# Patient Record
Sex: FEMALE | Race: WHITE | NOT HISPANIC OR LATINO | Employment: FULL TIME | URBAN - NONMETROPOLITAN AREA
[De-identification: names, ages, dates, MRNs, and addresses within clinical notes are randomized per-mention and may not be internally consistent; named-entity substitution may affect disease eponyms.]

---

## 2017-03-31 ENCOUNTER — HOSPITAL ENCOUNTER (EMERGENCY)
Facility: HOSPITAL | Age: 22
Discharge: HOME | End: 2017-03-31
Attending: FAMILY MEDICINE | Admitting: NURSE PRACTITIONER

## 2017-03-31 VITALS
HEIGHT: 71 IN | TEMPERATURE: 97.9 F | DIASTOLIC BLOOD PRESSURE: 66 MMHG | RESPIRATION RATE: 16 BRPM | BODY MASS INDEX: 21 KG/M2 | WEIGHT: 150 LBS | SYSTOLIC BLOOD PRESSURE: 108 MMHG | OXYGEN SATURATION: 99 % | HEART RATE: 88 BPM

## 2017-03-31 DIAGNOSIS — R11.2 NON-INTRACTABLE VOMITING WITH NAUSEA, UNSPECIFIED VOMITING TYPE: ICD-10-CM

## 2017-03-31 DIAGNOSIS — J02.9 PHARYNGITIS, UNSPECIFIED ETIOLOGY: Primary | ICD-10-CM

## 2017-03-31 LAB
FLUAV AG NPH QL: NEGATIVE
FLUBV AG NPH QL IA: NEGATIVE
S PYO AG THROAT QL: NEGATIVE

## 2017-03-31 PROCEDURE — 87804 INFLUENZA ASSAY W/OPTIC: CPT | Performed by: FAMILY MEDICINE

## 2017-03-31 PROCEDURE — 99283 EMERGENCY DEPT VISIT LOW MDM: CPT

## 2017-03-31 PROCEDURE — 87081 CULTURE SCREEN ONLY: CPT | Performed by: FAMILY MEDICINE

## 2017-03-31 PROCEDURE — 87880 STREP A ASSAY W/OPTIC: CPT | Performed by: FAMILY MEDICINE

## 2017-03-31 RX ORDER — PROMETHAZINE HYDROCHLORIDE 25 MG/1
25 TABLET ORAL EVERY 6 HOURS PRN
Qty: 20 TABLET | Refills: 0 | Status: SHIPPED | OUTPATIENT
Start: 2017-03-31 | End: 2017-04-18

## 2017-03-31 RX ORDER — AMOXICILLIN AND CLAVULANATE POTASSIUM 875; 125 MG/1; MG/1
1 TABLET, FILM COATED ORAL 2 TIMES DAILY
Qty: 20 TABLET | Refills: 0 | Status: SHIPPED | OUTPATIENT
Start: 2017-03-31 | End: 2017-04-10

## 2017-04-02 LAB — BACTERIA SPEC AEROBE CULT: NORMAL

## 2017-04-18 ENCOUNTER — PROCEDURE VISIT (OUTPATIENT)
Dept: OBSTETRICS AND GYNECOLOGY | Facility: CLINIC | Age: 22
End: 2017-04-18

## 2017-04-18 VITALS
HEIGHT: 71 IN | BODY MASS INDEX: 22.82 KG/M2 | DIASTOLIC BLOOD PRESSURE: 68 MMHG | SYSTOLIC BLOOD PRESSURE: 108 MMHG | WEIGHT: 163 LBS

## 2017-04-18 DIAGNOSIS — Z30.46 SURVEILLANCE OF IMPLANTABLE SUBDERMAL CONTRACEPTIVE: Primary | ICD-10-CM

## 2017-04-18 DIAGNOSIS — N89.8 VAGINAL DISCHARGE: ICD-10-CM

## 2017-04-18 DIAGNOSIS — A63.0 GENITAL WARTS: ICD-10-CM

## 2017-04-18 LAB
CANDIDA ALBICANS: NEGATIVE
GARDNERELLA VAGINALIS: POSITIVE
TRICHOMONAS VAGINALIS PCR: NEGATIVE

## 2017-04-18 PROCEDURE — 11983 REMOVE/INSERT DRUG IMPLANT: CPT | Performed by: NURSE PRACTITIONER

## 2017-04-18 PROCEDURE — 87510 GARDNER VAG DNA DIR PROBE: CPT | Performed by: NURSE PRACTITIONER

## 2017-04-18 PROCEDURE — 87660 TRICHOMONAS VAGIN DIR PROBE: CPT | Performed by: NURSE PRACTITIONER

## 2017-04-18 PROCEDURE — 87480 CANDIDA DNA DIR PROBE: CPT | Performed by: NURSE PRACTITIONER

## 2017-04-18 RX ORDER — METRONIDAZOLE 500 MG/1
500 TABLET ORAL 2 TIMES DAILY
Qty: 14 TABLET | Refills: 0 | Status: SHIPPED | OUTPATIENT
Start: 2017-04-18 | End: 2017-04-25

## 2017-04-18 RX ORDER — IMIQUIMOD 12.5 MG/.25G
CREAM TOPICAL 3 TIMES WEEKLY
Qty: 12 EACH | Refills: 2 | Status: SHIPPED | OUTPATIENT
Start: 2017-04-19 | End: 2018-09-13

## 2017-04-18 NOTE — PROGRESS NOTES
Nexplanon Removal and Reinsertion    Patient's last menstrual period was 04/16/2017 (exact date). Patient having c/o vaginal discharge and skin tags on vulva that has appeared within the last week. These areas are itchy. Patient swabbed self, and after doing physical exam, these lesions are consistent with condyloma.     Date of procedure:  4/18/2017    Risks and benefits discussed? yes  All questions answered? yes  Consents given by the patient  Written consent obtained? yes    Local anesthesia used:  yes - 2 cc's of  Meds; anesthesia local: 1% lidocaine with epinephrine    Procedure documentation:    The upper left arm (non-dominant) was marked at the intended site of removal.  The skin was cleansed with an antiseptic solution.  Local anesthesia was injected.  A small incision was created at the distal tip of the implant.  The implant was removed intact without difficulty.    The new Nexplanon was placed subdermally without difficulty through the same incision used to remove the prior implant.  The devise was able to be palpated in the arm by both myself and Millie.  Steri-strips were then placed across the site of insertion and the arm was wrapped.    She tolerated the procedure well.  There were no complications.  EBL was minimal.    NDC# 6185-7058-00    Post procedure instructions: Remove the wrapping in 24 hours and the steri-strips in 5 days.    Follow up needed: PRN    Assessment/Plan:  Millie was seen today for procedure and personal problem.    Diagnoses and all orders for this visit:    Surveillance of implantable subdermal contraceptive    Genital warts    Vaginal discharge  -     Gardnerella vaginalis, Trichomonas vaginalis, Candida albicans, PCR    Other orders  -     imiquimod (ALDARA) 5 % cream; Apply  topically 3 (Three) Times a Week.        This note was electronically signed.    Kal Whitfield, APRN  April 18, 2017

## 2018-09-13 ENCOUNTER — PROCEDURE VISIT (OUTPATIENT)
Dept: OBSTETRICS AND GYNECOLOGY | Facility: CLINIC | Age: 23
End: 2018-09-13

## 2018-09-13 VITALS
DIASTOLIC BLOOD PRESSURE: 80 MMHG | HEIGHT: 71 IN | BODY MASS INDEX: 22.26 KG/M2 | SYSTOLIC BLOOD PRESSURE: 112 MMHG | WEIGHT: 159 LBS

## 2018-09-13 DIAGNOSIS — Z30.46 NEXPLANON REMOVAL: Primary | ICD-10-CM

## 2018-09-13 DIAGNOSIS — R12 HEARTBURN: ICD-10-CM

## 2018-09-13 PROCEDURE — 11982 REMOVE DRUG IMPLANT DEVICE: CPT | Performed by: NURSE PRACTITIONER

## 2018-09-13 RX ORDER — PNV NO.95/FERROUS FUM/FOLIC AC 28MG-0.8MG
1 TABLET ORAL DAILY
Qty: 30 TABLET | Refills: 12 | Status: SHIPPED | OUTPATIENT
Start: 2018-09-13 | End: 2019-05-17 | Stop reason: SDUPTHER

## 2018-09-13 RX ORDER — OMEPRAZOLE 20 MG/1
20 TABLET, DELAYED RELEASE ORAL DAILY
Qty: 30 TABLET | Refills: 12 | OUTPATIENT
Start: 2018-09-13 | End: 2019-06-05

## 2018-09-13 NOTE — PROGRESS NOTES
Nexplanon Removal    Date of procedure:  9/13/2018    Risks and benefits discussed? yes  All questions answered? yes  Consents given by the patient  Written consent obtained? yes    Local anesthesia used:  yes - 3 cc's of  Meds; anesthesia local: 1% lidocaine with epinephrine    Procedure documentation:    The upper left arm (non-dominant) was marked at the intended site of removal.  The skin was cleansed with an antispetic solution.  Local anesthesia was injected.  A vertical horizontal was created at the distal tip of the implant.  The implant was removed intact without difficulty.  A 4x4 sterile gauze was placed over the incision site and the arm was wrapped with gauze.  She tolerated the procedure well.  There were no complications.  EBL was minimal.    Post procedure instructions: Remove the wrapping in 24 hours and cover with a  band-aid if still open.    Follow up needed: Next available time for pap smear. C/O frequent heartburn; takes Tums frequently. Sent Rx for prilosec.        Millie was seen today for nexplanon removal.    Diagnoses and all orders for this visit:    Nexplanon removal    Heartburn    Other orders  -     Prenatal Vit-Fe Fumarate-FA (PRENATAL VITAMIN) 27-0.8 MG tablet; Take 1 tablet by mouth Daily.  -     omeprazole OTC (PRILOSEC OTC) 20 MG EC tablet; Take 1 tablet by mouth Daily.        This note was electronically signed.    Alicia Mckeon, NISHI  September 13, 2018

## 2018-09-21 ENCOUNTER — APPOINTMENT (OUTPATIENT)
Dept: CT IMAGING | Facility: HOSPITAL | Age: 23
End: 2018-09-21

## 2018-09-21 ENCOUNTER — HOSPITAL ENCOUNTER (EMERGENCY)
Facility: HOSPITAL | Age: 23
Discharge: HOME | End: 2018-09-21
Attending: EMERGENCY MEDICINE | Admitting: EMERGENCY MEDICINE

## 2018-09-21 ENCOUNTER — APPOINTMENT (OUTPATIENT)
Dept: GENERAL RADIOLOGY | Facility: HOSPITAL | Age: 23
End: 2018-09-21

## 2018-09-21 VITALS
TEMPERATURE: 98.2 F | RESPIRATION RATE: 18 BRPM | OXYGEN SATURATION: 98 % | HEART RATE: 67 BPM | SYSTOLIC BLOOD PRESSURE: 114 MMHG | WEIGHT: 162 LBS | BODY MASS INDEX: 22.68 KG/M2 | DIASTOLIC BLOOD PRESSURE: 61 MMHG | HEIGHT: 71 IN

## 2018-09-21 DIAGNOSIS — R55 SYNCOPE AND COLLAPSE: Primary | ICD-10-CM

## 2018-09-21 LAB
ALBUMIN SERPL-MCNC: 4.3 G/DL (ref 3.4–4.8)
ALBUMIN/GLOB SERPL: 1.2 G/DL (ref 1.1–1.8)
ALP SERPL-CCNC: 83 U/L (ref 38–126)
ALT SERPL W P-5'-P-CCNC: 22 U/L (ref 9–52)
ANION GAP SERPL CALCULATED.3IONS-SCNC: 9 MMOL/L (ref 5–15)
APTT PPP: 28.7 SECONDS (ref 20–40.3)
AST SERPL-CCNC: 15 U/L (ref 14–36)
B-HCG UR QL: NEGATIVE
BASOPHILS # BLD AUTO: 0.02 10*3/MM3 (ref 0–0.2)
BASOPHILS NFR BLD AUTO: 0.3 % (ref 0–2)
BILIRUB SERPL-MCNC: 0.5 MG/DL (ref 0.2–1.3)
BILIRUB UR QL STRIP: NEGATIVE
BUN BLD-MCNC: 10 MG/DL (ref 7–21)
BUN/CREAT SERPL: 16.4 (ref 7–25)
CALCIUM SPEC-SCNC: 9.2 MG/DL (ref 8.4–10.2)
CHLORIDE SERPL-SCNC: 102 MMOL/L (ref 95–110)
CK MB SERPL-CCNC: <0.22 NG/ML (ref 0–5)
CK SERPL-CCNC: 34 U/L (ref 30–135)
CLARITY UR: CLEAR
CO2 SERPL-SCNC: 23 MMOL/L (ref 22–31)
COLOR UR: YELLOW
CREAT BLD-MCNC: 0.61 MG/DL (ref 0.5–1)
D-DIMER, QUANTITATIVE (MAD,POW, STR): <270 NG/ML (FEU) (ref 0–470)
DEPRECATED RDW RBC AUTO: 43.2 FL (ref 36.4–46.3)
EOSINOPHIL # BLD AUTO: 0.08 10*3/MM3 (ref 0–0.7)
EOSINOPHIL NFR BLD AUTO: 1.4 % (ref 0–7)
ERYTHROCYTE [DISTWIDTH] IN BLOOD BY AUTOMATED COUNT: 13.1 % (ref 11.5–14.5)
GFR SERPL CREATININE-BSD FRML MDRD: 122 ML/MIN/1.73 (ref 71–165)
GLOBULIN UR ELPH-MCNC: 3.7 GM/DL (ref 2.3–3.5)
GLUCOSE BLD-MCNC: 91 MG/DL (ref 60–100)
GLUCOSE UR STRIP-MCNC: NEGATIVE MG/DL
HCT VFR BLD AUTO: 41.9 % (ref 35–45)
HGB BLD-MCNC: 14.4 G/DL (ref 12–15.5)
HGB UR QL STRIP.AUTO: NEGATIVE
HOLD SPECIMEN: NORMAL
HOLD SPECIMEN: NORMAL
IMM GRANULOCYTES # BLD: 0.01 10*3/MM3 (ref 0–0.02)
IMM GRANULOCYTES NFR BLD: 0.2 % (ref 0–0.5)
INR PPP: 0.97 (ref 0.8–1.2)
KETONES UR QL STRIP: NEGATIVE
LEUKOCYTE ESTERASE UR QL STRIP.AUTO: NEGATIVE
LYMPHOCYTES # BLD AUTO: 1.53 10*3/MM3 (ref 0.6–4.2)
LYMPHOCYTES NFR BLD AUTO: 26.6 % (ref 10–50)
MCH RBC QN AUTO: 31 PG (ref 26.5–34)
MCHC RBC AUTO-ENTMCNC: 34.4 G/DL (ref 31.4–36)
MCV RBC AUTO: 90.1 FL (ref 80–98)
MONOCYTES # BLD AUTO: 0.46 10*3/MM3 (ref 0–0.9)
MONOCYTES NFR BLD AUTO: 8 % (ref 0–12)
NEUTROPHILS # BLD AUTO: 3.66 10*3/MM3 (ref 2–8.6)
NEUTROPHILS NFR BLD AUTO: 63.5 % (ref 37–80)
NITRITE UR QL STRIP: NEGATIVE
PH UR STRIP.AUTO: 6 [PH] (ref 5–9)
PLATELET # BLD AUTO: 241 10*3/MM3 (ref 150–450)
PMV BLD AUTO: 10 FL (ref 8–12)
POTASSIUM BLD-SCNC: 3.4 MMOL/L (ref 3.5–5.1)
PROT SERPL-MCNC: 8 G/DL (ref 6.3–8.6)
PROT UR QL STRIP: NEGATIVE
PROTHROMBIN TIME: 12.7 SECONDS (ref 11.1–15.3)
RBC # BLD AUTO: 4.65 10*6/MM3 (ref 3.77–5.16)
SODIUM BLD-SCNC: 134 MMOL/L (ref 137–145)
SP GR UR STRIP: 1.01 (ref 1–1.03)
TROPONIN I SERPL-MCNC: <0.012 NG/ML
UROBILINOGEN UR QL STRIP: NORMAL
WBC NRBC COR # BLD: 5.76 10*3/MM3 (ref 3.2–9.8)
WHOLE BLOOD HOLD SPECIMEN: NORMAL
WHOLE BLOOD HOLD SPECIMEN: NORMAL

## 2018-09-21 PROCEDURE — 93005 ELECTROCARDIOGRAM TRACING: CPT | Performed by: EMERGENCY MEDICINE

## 2018-09-21 PROCEDURE — 93010 ELECTROCARDIOGRAM REPORT: CPT | Performed by: INTERNAL MEDICINE

## 2018-09-21 PROCEDURE — 82553 CREATINE MB FRACTION: CPT | Performed by: EMERGENCY MEDICINE

## 2018-09-21 PROCEDURE — 85025 COMPLETE CBC W/AUTO DIFF WBC: CPT | Performed by: EMERGENCY MEDICINE

## 2018-09-21 PROCEDURE — 84484 ASSAY OF TROPONIN QUANT: CPT | Performed by: EMERGENCY MEDICINE

## 2018-09-21 PROCEDURE — 99284 EMERGENCY DEPT VISIT MOD MDM: CPT

## 2018-09-21 PROCEDURE — 82550 ASSAY OF CK (CPK): CPT | Performed by: EMERGENCY MEDICINE

## 2018-09-21 PROCEDURE — 71046 X-RAY EXAM CHEST 2 VIEWS: CPT

## 2018-09-21 PROCEDURE — 70450 CT HEAD/BRAIN W/O DYE: CPT

## 2018-09-21 PROCEDURE — 93005 ELECTROCARDIOGRAM TRACING: CPT

## 2018-09-21 PROCEDURE — 81025 URINE PREGNANCY TEST: CPT

## 2018-09-21 PROCEDURE — 81003 URINALYSIS AUTO W/O SCOPE: CPT

## 2018-09-21 PROCEDURE — 80053 COMPREHEN METABOLIC PANEL: CPT | Performed by: EMERGENCY MEDICINE

## 2018-09-21 PROCEDURE — 85730 THROMBOPLASTIN TIME PARTIAL: CPT | Performed by: EMERGENCY MEDICINE

## 2018-09-21 PROCEDURE — 85379 FIBRIN DEGRADATION QUANT: CPT | Performed by: EMERGENCY MEDICINE

## 2018-09-21 PROCEDURE — 96360 HYDRATION IV INFUSION INIT: CPT

## 2018-09-21 PROCEDURE — 85610 PROTHROMBIN TIME: CPT | Performed by: EMERGENCY MEDICINE

## 2018-09-21 RX ORDER — SODIUM CHLORIDE 0.9 % (FLUSH) 0.9 %
10 SYRINGE (ML) INJECTION AS NEEDED
Status: DISCONTINUED | OUTPATIENT
Start: 2018-09-21 | End: 2018-09-21 | Stop reason: HOSPADM

## 2018-09-21 RX ORDER — POTASSIUM CHLORIDE 750 MG/1
20 CAPSULE, EXTENDED RELEASE ORAL ONCE
Status: COMPLETED | OUTPATIENT
Start: 2018-09-21 | End: 2018-09-21

## 2018-09-21 RX ORDER — ACETAMINOPHEN 500 MG
1000 TABLET ORAL ONCE
Status: COMPLETED | OUTPATIENT
Start: 2018-09-21 | End: 2018-09-21

## 2018-09-21 RX ORDER — SODIUM CHLORIDE 9 MG/ML
INJECTION, SOLUTION INTRAVENOUS
Status: COMPLETED
Start: 2018-09-21 | End: 2018-09-21

## 2018-09-21 RX ADMIN — POTASSIUM CHLORIDE 20 MEQ: 750 CAPSULE, EXTENDED RELEASE ORAL at 16:18

## 2018-09-21 RX ADMIN — SODIUM CHLORIDE 500 ML: 9 INJECTION, SOLUTION INTRAVENOUS at 14:40

## 2018-09-21 RX ADMIN — ACETAMINOPHEN 1000 MG: 500 TABLET ORAL at 14:39

## 2018-09-21 NOTE — ED NOTES
Stressed importance of f/u with cardiologist and establishing care with PCP      Mari Adame, RN  09/21/18 [id number]

## 2018-09-21 NOTE — ED PROVIDER NOTES
"Subjective   23yo female pmh significant asthma presents ED c/o unwitnessed syncopal episode earlier today while standing in bathroom preceeded by substernal chest \"tightness.\"  Pt reports subsequently awoke on floor.  ROS (+) chronic intermittent headaches; pt reports current 2d hx headache.  ROS neg fever/n/v/d/soa/cough/abd pain/dysuria/ hx seizure/incontinence/family hx sudden cardiac death.        Syncope   Episode history:  Single  Most recent episode:  Today  Chronicity:  Recurrent  Witnessed: no    Associated symptoms: chest pain and headaches    Associated symptoms: no shortness of breath and no weakness        Review of Systems   Constitutional: Negative.    HENT: Negative for congestion.    Eyes: Negative.    Respiratory: Positive for chest tightness. Negative for shortness of breath.    Cardiovascular: Positive for chest pain and syncope.   Gastrointestinal: Negative.    Genitourinary: Negative.    Musculoskeletal: Negative.    Skin: Negative.    Neurological: Positive for syncope and headaches. Negative for weakness and numbness.   All other systems reviewed and are negative.      Past Medical History:   Diagnosis Date   • Abnormal Pap smear of cervix    • Acute maxillary sinusitis    • Acute otitis externa    • Acute pharyngitis    • Acute tonsillitis    • Allergic asthma     IgE-mediated allergic asthma     • Allergic rhinitis    • Allergic rhinitis due to pollen    • Anxiety    • Asthma    • Chondromalacia of patella     left knee    • Common cold    • Cough    • Diarrhea    • Disorder of gallbladder     Probable biliary dyskinesia    • Epigastric pain    • Foot pain    • Gastroenteritis    • Generalized abdominal pain    • Headache    • History of colonoscopy 03/27/2013    Colon endoscopy 13571 (1)  -  Internal & external hemorrhoids found.   • History of esophagogastroduodenoscopy (EGD) 03/27/2013    w/ tube 05736 (1)  -  Normal esophagus. Gastritis in stomach. Biopsy taken. Normal duodenum. Biospy " taken   • HPV (human papilloma virus) infection    • Influenza    • Irregular periods    • Irritable bowel syndrome    • Migraine    • Nausea    • Nausea and vomiting    • Osgood-Schlatter's disease    • Pain in throat    • Patellar tendonitis    • Right upper quadrant pain    • Streptococcal sore throat    • Temporomandibular joint disorder     WISDOM TEETH    • Upper respiratory infection    • Urgency of urination    • Urinary tract infectious disease    • Varicella    • Viral gastroenteritis    • Vulvovaginitis        Allergies   Allergen Reactions   • Aspirin      TIGHT CHEST   • Codeine      Itching   • Erythromycin Base      UNKNOWN REACTION   • Naproxen      Chest pain   • Latex Rash     Rash        Past Surgical History:   Procedure Laterality Date   • CHOLECYSTECTOMY  06/06/2013    Laparoscopic  -  laparoscopic cholecystectomy with intraoperative cholangiogram. Cholecystitis.   • INJECTION OF MEDICATION  01/08/2013    Toradol (1)   -  W. Sotomayor   • WISDOM TOOTH EXTRACTION         Family History   Problem Relation Age of Onset   • Adopted: Yes   • Cancer Other    • Diabetes Other    • Heart disease Other    • Hypertension Other    • Stroke Other    • Lung disease Other    • Cholelithiasis Other    • Ulcers Other    • Other Other         Colon problems   • Ovarian cysts Mother    • Bipolar disorder Mother        Social History     Social History   • Marital status: Single     Social History Main Topics   • Smoking status: Never Smoker   • Smokeless tobacco: Never Used   • Alcohol use Yes      Comment: occasional   • Drug use: No   • Sexual activity: Yes     Partners: Male     Birth control/ protection: None     Other Topics Concern   • Not on file           Objective   Physical Exam   Constitutional: She is oriented to person, place, and time. She appears well-developed and well-nourished.   HENT:   Head: Normocephalic and atraumatic.   Right Ear: External ear normal.   Left Ear: External ear normal.   Nose:  Nose normal.   Mouth/Throat: Oropharynx is clear and moist.   Eyes: Pupils are equal, round, and reactive to light. EOM are normal.   Neck: Normal range of motion. Neck supple. No JVD present. No tracheal deviation present.   Cardiovascular: Normal rate, regular rhythm, normal heart sounds and intact distal pulses.  Exam reveals no gallop and no friction rub.    No murmur heard.  Pulmonary/Chest: Effort normal and breath sounds normal. She has no wheezes. She has no rales.   Abdominal: Soft. Bowel sounds are normal. She exhibits no mass. There is no tenderness. There is no rebound and no guarding.   Musculoskeletal: Normal range of motion.   Lymphadenopathy:     She has no cervical adenopathy.   Neurological: She is alert and oriented to person, place, and time. She has normal strength. No cranial nerve deficit or sensory deficit. Coordination normal. GCS eye subscore is 4. GCS verbal subscore is 5. GCS motor subscore is 6.   Skin: Skin is warm and dry.   Nursing note and vitals reviewed.      ECG 12 Lead    Date/Time: 9/21/2018 2:38 PM  Performed by: SIMONE ORELLANA  Authorized by: SIMONE ORELLANA   Interpreted by physician  Rhythm: sinus rhythm  Rate: normal  BPM: 82  QRS axis: normal  Conduction: conduction normal  ST Segments: ST segments normal  T Waves: T waves normal  Other: no other findings  Clinical impression: normal ECG                 ED Course      Labs Reviewed   COMPREHENSIVE METABOLIC PANEL - Abnormal; Notable for the following:        Result Value    Sodium 134 (*)     Potassium 3.4 (*)     Globulin 3.7 (*)     All other components within normal limits   PREGNANCY, URINE - Normal   URINALYSIS W/ MICROSCOPIC IF INDICATED (NO CULTURE) - Normal    Narrative:     Urine microscopic not indicated.   CBC WITH AUTO DIFFERENTIAL - Normal   PROTIME-INR - Normal    Narrative:     Therapeutic range for most indications is 2.0-3.0 INR,  or 2.5-3.5 for mechanical heart valves.   APTT - Normal    Narrative:     The  recommended Heparin therapeutic range is 68-97 seconds.   TROPONIN (IN-HOUSE) - Normal   CK - Normal   CK MB - Normal   D-DIMER, QUANTITATIVE - Normal    Narrative:     Dimer values <500 ng/ml FEU are FDA approved as aid in diagnosis of deep venous thrombosis and pulmonary embolism.  This test should not be used in an exclusion strategy with pretest probability alone.    A recent guideline regarding diagnosis for pulmonary thromboembolism recommends an adjusted exclusion criterion of age x 10 ng/ml FEU for patients >50 years of age ([employer] Intern Med 2015; 163: 701-711).   RAINBOW DRAW    Narrative:     The following orders were created for panel order [place] Draw.  Procedure                               Abnormality         Status                     ---------                               -----------         ------                     Light Blue Top[[id number]]                                   Final result               Green Top (Gel)[[id number]]                                  Final result               Lavender Top[[id number]]                                     Final result               Gold Top - SST[[id number]]                                   Final result                 Please view results for these tests on the individual orders.   TROPONIN (IN-HOUSE)   TROPONIN (IN-HOUSE)   CBC AND DIFFERENTIAL    Narrative:     The following orders were created for panel order CBC & Differential.  Procedure                               Abnormality         Status                     ---------                               -----------         ------                     CBC Auto Differential[[id number]]        Normal              Final result                 Please view results for these tests on the individual orders.   LIGHT BLUE TOP   GREEN TOP   LAVENDER TOP   GOLD TOP - SST     Xr Chest 2 View    Result Date: 9/21/2018  Narrative: TWO VIEW CHEST HISTORY: Chest pain. Chest tightness. Frontal and lateral films of the  chest were obtained. COMPARISON: October 23, 2014 EKG leads. The lungs are clear of an acute process. The heart is not enlarged. The pulmonary vasculature is not increased. No pleural effusion. No pneumothorax. No acute osseous abnormality. Surgical clips right upper quadrant of the abdomen.     Impression: CONCLUSION: Normal chest [id number] Electronically signed by:  Roberto Zimmerman MD  9/21/2018 4:00 PM CDT Workstation: [employer]    Ct Head Without Contrast    Result Date: 9/21/2018  Narrative: CT Head Without Contrast History: Headache Axial scans of the brain were obtained without intravenous contrast.  Coronal and sagital reconstructions were preformed. This exam was performed according to our departmental dose-optimization program, which includes automated exposure control, adjustment of the mA and/or kV according to patient size and/or use of iterative reconstruction technique. DLP: 829.60 Comparison: None Bone windows are unremarkable. The visualized paranasal sinuses are unremarkable. No hemorrhage. No mass. No abnormal areas of increased or decreased attenuation. No midline shift. No abnormal extra-axial fluid collections.     Impression: CONCLUSION: Normal nonenhanced CT of the brain [id number] Electronically signed by:  Roberto Zimmerman MD  9/21/2018 3:33 PM CDT Workstation: [employer]                [facility]      Final diagnoses:   Syncope and collapse            Isai Salvador MD  09/21/18 2559

## 2018-09-21 NOTE — ED NOTES
"Pt states she has had a total of 5 episodes (last episode was 3 months ago and then today) where she experiences this severe chest tightness that lasts a few seconds and then pt \"blacks out and wakes up on the floor\". Pt reports this happened in the bathroom today and reports hitting her head on the toilet or bathtub. Pt states the last episode was witnessed by a friend but she hasn't sought medical help for this.      Mari Adame, RN  09/21/18 6673    "

## 2019-05-17 ENCOUNTER — OFFICE VISIT (OUTPATIENT)
Dept: OBSTETRICS AND GYNECOLOGY | Facility: CLINIC | Age: 24
End: 2019-05-17

## 2019-05-17 VITALS
SYSTOLIC BLOOD PRESSURE: 118 MMHG | BODY MASS INDEX: 25.34 KG/M2 | WEIGHT: 181 LBS | HEART RATE: 100 BPM | DIASTOLIC BLOOD PRESSURE: 74 MMHG | HEIGHT: 71 IN

## 2019-05-17 DIAGNOSIS — Z31.69 GENERAL COUNSELING AND ADVICE FOR PROCREATIVE MANAGEMENT: ICD-10-CM

## 2019-05-17 DIAGNOSIS — Z01.419 ENCOUNTER FOR GYNECOLOGICAL EXAMINATION WITHOUT ABNORMAL FINDING: Primary | ICD-10-CM

## 2019-05-17 DIAGNOSIS — N89.8 VAGINAL DISCHARGE: ICD-10-CM

## 2019-05-17 LAB
CANDIDA ALBICANS: NEGATIVE
GARDNERELLA VAGINALIS: POSITIVE
TRICHOMONAS VAGINALIS PCR: NEGATIVE

## 2019-05-17 PROCEDURE — 87491 CHLMYD TRACH DNA AMP PROBE: CPT | Performed by: NURSE PRACTITIONER

## 2019-05-17 PROCEDURE — 87660 TRICHOMONAS VAGIN DIR PROBE: CPT | Performed by: NURSE PRACTITIONER

## 2019-05-17 PROCEDURE — 87510 GARDNER VAG DNA DIR PROBE: CPT | Performed by: NURSE PRACTITIONER

## 2019-05-17 PROCEDURE — 87480 CANDIDA DNA DIR PROBE: CPT | Performed by: NURSE PRACTITIONER

## 2019-05-17 PROCEDURE — 88142 CYTOPATH C/V THIN LAYER: CPT | Performed by: NURSE PRACTITIONER

## 2019-05-17 PROCEDURE — 87661 TRICHOMONAS VAGINALIS AMPLIF: CPT | Performed by: NURSE PRACTITIONER

## 2019-05-17 PROCEDURE — 87591 N.GONORRHOEAE DNA AMP PROB: CPT | Performed by: NURSE PRACTITIONER

## 2019-05-17 PROCEDURE — 88141 CYTOPATH C/V INTERPRET: CPT | Performed by: PATHOLOGY

## 2019-05-17 PROCEDURE — 99395 PREV VISIT EST AGE 18-39: CPT | Performed by: NURSE PRACTITIONER

## 2019-05-17 RX ORDER — PNV NO.95/FERROUS FUM/FOLIC AC 28MG-0.8MG
1 TABLET ORAL DAILY
Qty: 30 TABLET | Refills: 12 | Status: SHIPPED | OUTPATIENT
Start: 2019-05-17 | End: 2019-11-14 | Stop reason: SDUPTHER

## 2019-05-18 LAB
C TRACH RRNA CVX QL NAA+PROBE: NEGATIVE
N GONORRHOEA RRNA SPEC QL NAA+PROBE: NEGATIVE
TRICHOMONAS VAGINALIS PCR: NEGATIVE

## 2019-05-20 RX ORDER — METRONIDAZOLE 500 MG/1
500 TABLET ORAL 2 TIMES DAILY
Qty: 14 TABLET | Refills: 0 | Status: SHIPPED | OUTPATIENT
Start: 2019-05-20 | End: 2019-05-27

## 2019-05-22 NOTE — PROGRESS NOTES
Subjective   Millie Camara is a 23 y.o. Here for pap smear and has concerns about why she hasn't conceived yet; has bee trying since October 2018.    LMP- 4/20; cycles are appx 27-29 days    Her  has a 1 year old. Neither of them use alcohol, nicotine products or recreational drugs.    Last pap- 2010      Gynecologic Exam   The patient's primary symptoms include vaginal discharge. The patient's pertinent negatives include no genital itching, genital lesions, genital odor, genital rash, missed menses, pelvic pain or vaginal bleeding. This is a new problem. The current episode started 1 to 4 weeks ago. The problem occurs daily. The problem has been unchanged. The patient is experiencing no pain. Pertinent negatives include no abdominal pain, constipation, diarrhea or dysuria. She is sexually active. No, her partner does not have an STD. She uses nothing for contraception. Her menstrual history has been regular.       The following portions of the patient's history were reviewed and updated as appropriate: allergies, current medications, past family history, past medical history, past social history, past surgical history and problem list.    Review of Systems   Constitutional: Negative for activity change, appetite change, diaphoresis, fatigue, unexpected weight gain and unexpected weight loss.   Respiratory: Negative for chest tightness and shortness of breath.    Cardiovascular: Negative for chest pain and palpitations.   Gastrointestinal: Negative for abdominal distention, abdominal pain, constipation and diarrhea.   Endocrine: Negative.    Genitourinary: Positive for vaginal discharge. Negative for breast discharge, decreased libido, dyspareunia, dysuria, menstrual problem, missed menses, pelvic pain, vaginal bleeding, vaginal pain, breast lump and breast pain.   Musculoskeletal: Negative for myalgias.   Skin: Negative for color change, dry skin and skin lesions.   Neurological: Negative for  light-headedness and headache.   Psychiatric/Behavioral: Negative for agitation, dysphoric mood, sleep disturbance, depressed mood and stress. The patient is not nervous/anxious.        Objective   Physical Exam   Constitutional: She is oriented to person, place, and time. She appears well-developed and well-nourished.   Neck: No thyromegaly present.   Cardiovascular: Normal rate, regular rhythm, normal heart sounds and intact distal pulses.   Pulmonary/Chest: Effort normal and breath sounds normal. Right breast exhibits no inverted nipple, no mass, no nipple discharge, no skin change and no tenderness. Left breast exhibits no inverted nipple, no mass, no nipple discharge, no skin change and no tenderness. Breasts are symmetrical.   Abdominal: Soft. Bowel sounds are normal. She exhibits no distension. There is no tenderness.   Genitourinary: Uterus normal. No breast discharge or bleeding. There is no rash, tenderness, lesion or injury on the right labia. There is no rash, tenderness, lesion or injury on the left labia. Cervix exhibits no motion tenderness, no discharge and no friability. Right adnexum displays no mass, no tenderness and no fullness. Left adnexum displays no mass, no tenderness and no fullness. There is erythema in the vagina. No tenderness or bleeding in the vagina. No foreign body in the vagina. No signs of injury around the vagina. Vaginal discharge found.   Genitourinary Comments: Pap smear, vag panel and gc/ct obtained.    Lymphadenopathy:     She has no axillary adenopathy.        Right: No inguinal adenopathy present.        Left: No inguinal adenopathy present.   Neurological: She is alert and oriented to person, place, and time.   Skin: Skin is warm, dry and intact.   Psychiatric: She has a normal mood and affect. Her speech is normal and behavior is normal.   Nursing note and vitals reviewed.        Assessment/Plan   Millie was seen today for infertility.    Diagnoses and all orders for this  visit:    Encounter for gynecological examination without abnormal finding  -     Liquid-based Pap Smear, Screening    Vaginal discharge  -     Chlamydia trachomatis, Neisseria gonorrhoeae, Trichomonas vaginalis, PCR - Swab, Vagina  -     Gardnerella vaginalis, Trichomonas vaginalis, Candida albicans, PCR - Swab, Vagina    General counseling and advice for procreative management    Other orders  -     Prenatal Vit-Fe Fumarate-FA (PRENATAL VITAMIN) 27-0.8 MG tablet; Take 1 tablet by mouth Daily.      Counseling was given to patient for the following topics: patient and family education and menstrual/ovulatory cycles and normal time range to conceive. Start a prenatal vitamin and use home ovulation tests to detect if she is ovulating monthly.

## 2019-05-23 LAB
GEN CATEG CVX/VAG CYTO-IMP: ABNORMAL
LAB AP CASE REPORT: ABNORMAL
LAB AP GYN ADDITIONAL INFORMATION: ABNORMAL
PATH INTERP SPEC-IMP: ABNORMAL
STAT OF ADQ CVX/VAG CYTO-IMP: ABNORMAL

## 2019-06-04 ENCOUNTER — APPOINTMENT (OUTPATIENT)
Dept: ULTRASOUND IMAGING | Facility: HOSPITAL | Age: 24
End: 2019-06-04

## 2019-06-04 ENCOUNTER — HOSPITAL ENCOUNTER (EMERGENCY)
Facility: HOSPITAL | Age: 24
Discharge: HOME | End: 2019-06-05
Attending: EMERGENCY MEDICINE | Admitting: EMERGENCY MEDICINE

## 2019-06-04 DIAGNOSIS — O20.0 THREATENED MISCARRIAGE: Primary | ICD-10-CM

## 2019-06-04 LAB
ALBUMIN SERPL-MCNC: 4.2 G/DL (ref 3.5–5.2)
ALBUMIN/GLOB SERPL: 1.2 G/DL
ALP SERPL-CCNC: 76 U/L (ref 39–117)
ALT SERPL W P-5'-P-CCNC: 11 U/L (ref 1–33)
ANION GAP SERPL CALCULATED.3IONS-SCNC: 13 MMOL/L
AST SERPL-CCNC: 19 U/L (ref 1–32)
B-HCG UR QL: POSITIVE
BACTERIA UR QL AUTO: ABNORMAL /HPF
BASOPHILS # BLD AUTO: 0.05 10*3/MM3 (ref 0–0.2)
BASOPHILS NFR BLD AUTO: 0.4 % (ref 0–1.5)
BILIRUB SERPL-MCNC: 0.2 MG/DL (ref 0.2–1.2)
BILIRUB UR QL STRIP: NEGATIVE
BUN BLD-MCNC: 10 MG/DL (ref 6–20)
BUN/CREAT SERPL: 17.5 (ref 7–25)
CALCIUM SPEC-SCNC: 9.4 MG/DL (ref 8.6–10.5)
CHLORIDE SERPL-SCNC: 101 MMOL/L (ref 98–107)
CLARITY UR: ABNORMAL
CO2 SERPL-SCNC: 23 MMOL/L (ref 22–29)
COLOR UR: YELLOW
CREAT BLD-MCNC: 0.57 MG/DL (ref 0.57–1)
DEPRECATED RDW RBC AUTO: 42.4 FL (ref 37–54)
EOSINOPHIL # BLD AUTO: 0.08 10*3/MM3 (ref 0–0.4)
EOSINOPHIL NFR BLD AUTO: 0.7 % (ref 0.3–6.2)
ERYTHROCYTE [DISTWIDTH] IN BLOOD BY AUTOMATED COUNT: 13 % (ref 12.3–15.4)
GFR SERPL CREATININE-BSD FRML MDRD: 130 ML/MIN/1.73
GLOBULIN UR ELPH-MCNC: 3.6 GM/DL
GLUCOSE BLD-MCNC: 105 MG/DL (ref 65–99)
GLUCOSE UR STRIP-MCNC: NEGATIVE MG/DL
HCG INTACT+B SERPL-ACNC: NORMAL MIU/ML
HCT VFR BLD AUTO: 38.9 % (ref 34–46.6)
HGB BLD-MCNC: 12.9 G/DL (ref 12–15.9)
HGB UR QL STRIP.AUTO: NEGATIVE
HYALINE CASTS UR QL AUTO: ABNORMAL /LPF
IMM GRANULOCYTES # BLD AUTO: 0.07 10*3/MM3 (ref 0–0.05)
IMM GRANULOCYTES NFR BLD AUTO: 0.6 % (ref 0–0.5)
KETONES UR QL STRIP: NEGATIVE
LEUKOCYTE ESTERASE UR QL STRIP.AUTO: ABNORMAL
LYMPHOCYTES # BLD AUTO: 2.53 10*3/MM3 (ref 0.7–3.1)
LYMPHOCYTES NFR BLD AUTO: 21.6 % (ref 19.6–45.3)
MCH RBC QN AUTO: 29.5 PG (ref 26.6–33)
MCHC RBC AUTO-ENTMCNC: 33.2 G/DL (ref 31.5–35.7)
MCV RBC AUTO: 89 FL (ref 79–97)
MONOCYTES # BLD AUTO: 0.66 10*3/MM3 (ref 0.1–0.9)
MONOCYTES NFR BLD AUTO: 5.6 % (ref 5–12)
MUCOUS THREADS URNS QL MICRO: ABNORMAL /HPF
NEUTROPHILS # BLD AUTO: 8.3 10*3/MM3 (ref 1.7–7)
NEUTROPHILS NFR BLD AUTO: 71.1 % (ref 42.7–76)
NITRITE UR QL STRIP: NEGATIVE
NRBC BLD AUTO-RTO: 0 /100 WBC (ref 0–0.2)
PH UR STRIP.AUTO: 6 [PH] (ref 5–9)
PLATELET # BLD AUTO: 297 10*3/MM3 (ref 140–450)
PMV BLD AUTO: 9.5 FL (ref 6–12)
POTASSIUM BLD-SCNC: 3.6 MMOL/L (ref 3.5–5.2)
PROT SERPL-MCNC: 7.8 G/DL (ref 6–8.5)
PROT UR QL STRIP: NEGATIVE
RBC # BLD AUTO: 4.37 10*6/MM3 (ref 3.77–5.28)
RBC # UR: ABNORMAL /HPF
REF LAB TEST METHOD: ABNORMAL
SODIUM BLD-SCNC: 137 MMOL/L (ref 136–145)
SP GR UR STRIP: 1.02 (ref 1–1.03)
SQUAMOUS #/AREA URNS HPF: ABNORMAL /HPF
UROBILINOGEN UR QL STRIP: ABNORMAL
WBC NRBC COR # BLD: 11.69 10*3/MM3 (ref 3.4–10.8)
WBC UR QL AUTO: ABNORMAL /HPF

## 2019-06-04 PROCEDURE — 86901 BLOOD TYPING SEROLOGIC RH(D): CPT | Performed by: EMERGENCY MEDICINE

## 2019-06-04 PROCEDURE — 76817 TRANSVAGINAL US OBSTETRIC: CPT

## 2019-06-04 PROCEDURE — 86900 BLOOD TYPING SEROLOGIC ABO: CPT | Performed by: EMERGENCY MEDICINE

## 2019-06-04 PROCEDURE — 99284 EMERGENCY DEPT VISIT MOD MDM: CPT

## 2019-06-04 PROCEDURE — 84702 CHORIONIC GONADOTROPIN TEST: CPT | Performed by: EMERGENCY MEDICINE

## 2019-06-04 PROCEDURE — 85025 COMPLETE CBC W/AUTO DIFF WBC: CPT | Performed by: EMERGENCY MEDICINE

## 2019-06-04 PROCEDURE — 81001 URINALYSIS AUTO W/SCOPE: CPT

## 2019-06-04 PROCEDURE — 81025 URINE PREGNANCY TEST: CPT

## 2019-06-04 PROCEDURE — 80053 COMPREHEN METABOLIC PANEL: CPT | Performed by: EMERGENCY MEDICINE

## 2019-06-04 RX ORDER — ACETAMINOPHEN 500 MG
1000 TABLET ORAL ONCE
Status: COMPLETED | OUTPATIENT
Start: 2019-06-04 | End: 2019-06-04

## 2019-06-04 RX ORDER — SODIUM CHLORIDE 0.9 % (FLUSH) 0.9 %
10 SYRINGE (ML) INJECTION AS NEEDED
Status: DISCONTINUED | OUTPATIENT
Start: 2019-06-04 | End: 2019-06-05 | Stop reason: HOSPADM

## 2019-06-04 RX ADMIN — SODIUM CHLORIDE 1000 ML: 9 INJECTION, SOLUTION INTRAVENOUS at 22:30

## 2019-06-04 RX ADMIN — ACETAMINOPHEN 1000 MG: 500 TABLET ORAL at 22:32

## 2019-06-05 VITALS
RESPIRATION RATE: 18 BRPM | TEMPERATURE: 98.2 F | SYSTOLIC BLOOD PRESSURE: 106 MMHG | HEIGHT: 71 IN | WEIGHT: 183.1 LBS | BODY MASS INDEX: 25.63 KG/M2 | HEART RATE: 80 BPM | OXYGEN SATURATION: 98 % | DIASTOLIC BLOOD PRESSURE: 55 MMHG

## 2019-06-05 LAB
ABO GROUP BLD: NORMAL
RH BLD: NEGATIVE

## 2019-06-05 PROCEDURE — 96372 THER/PROPH/DIAG INJ SC/IM: CPT

## 2019-06-05 PROCEDURE — 25010000003 RHO D IMMUNE GLOBULIN 1500 UNITS SOLUTION PREFILLED SYRINGE: Performed by: EMERGENCY MEDICINE

## 2019-06-05 RX ADMIN — HUMAN RHO(D) IMMUNE GLOBULIN 300 MCG: 300 INJECTION, SOLUTION INTRAMUSCULAR at 00:52

## 2019-06-05 NOTE — ED PROVIDER NOTES
Subjective   23-year-old white female presents to the emergency department with chief complaint of pregnancy problem.  Patient relates she is 6 weeks pregnant.  Her last menses was .  Patient is  A0.  Patient complains of lower abdominal cramps and spotting for 2 days.  Patient is taking prenatal vitamins. She has not been seen by an obstetrician yet.            Review of Systems   Constitutional: Positive for fever. Negative for chills and diaphoresis.   Eyes: Negative for visual disturbance.   Respiratory: Negative for cough and shortness of breath.    Cardiovascular: Negative for chest pain and leg swelling.   Gastrointestinal: Positive for abdominal pain, diarrhea, nausea and vomiting.   Genitourinary: Positive for vaginal bleeding. Negative for dysuria.   Musculoskeletal: Negative for back pain, gait problem, neck pain and neck stiffness.   Neurological: Negative for syncope, weakness and headaches.   All other systems reviewed and are negative.      Past Medical History:   Diagnosis Date   • Abnormal Pap smear of cervix    • Acute maxillary sinusitis    • Acute otitis externa    • Acute pharyngitis    • Acute tonsillitis    • Allergic asthma     IgE-mediated allergic asthma     • Allergic rhinitis    • Allergic rhinitis due to pollen    • Anxiety    • Asthma    • Chondromalacia of patella     left knee    • Common cold    • Cough    • Diarrhea    • Disorder of gallbladder     Probable biliary dyskinesia    • Epigastric pain    • Foot pain    • Gastroenteritis    • Generalized abdominal pain    • Headache    • History of colonoscopy 2013    Colon endoscopy 71344 (1)  -  Internal & external hemorrhoids found.   • History of esophagogastroduodenoscopy (EGD) 2013    w/ tube 77985 (1)  -  Normal esophagus. Gastritis in stomach. Biopsy taken. Normal duodenum. Biospy taken   • HPV (human papilloma virus) infection    • Influenza    • Irregular periods    • Irritable bowel syndrome    •  Migraine    • Nausea    • Nausea and vomiting    • Osgood-Schlatter's disease    • Pain in throat    • Patellar tendonitis    • Right upper quadrant pain    • Streptococcal sore throat    • Temporomandibular joint disorder     WISDOM TEETH    • Upper respiratory infection    • Urgency of urination    • Urinary tract infectious disease    • Varicella    • Viral gastroenteritis    • Vulvovaginitis        Allergies   Allergen Reactions   • Aspirin      TIGHT CHEST   • Codeine      Itching   • Erythromycin Base      UNKNOWN REACTION   • Naproxen      Chest pain   • Latex Rash     Rash        Past Surgical History:   Procedure Laterality Date   • CHOLECYSTECTOMY  06/06/2013    Laparoscopic  -  laparoscopic cholecystectomy with intraoperative cholangiogram. Cholecystitis.   • INJECTION OF MEDICATION  01/08/2013    Toradol (1)   -  W. Sotomayor   • WISDOM TOOTH EXTRACTION         Family History   Adopted: Yes   Problem Relation Age of Onset   • Cancer Other    • Diabetes Other    • Heart disease Other    • Hypertension Other    • Stroke Other    • Lung disease Other    • Cholelithiasis Other    • Ulcers Other    • Other Other         Colon problems   • Ovarian cysts Mother    • Bipolar disorder Mother        Social History     Socioeconomic History   • Marital status:      Spouse name: Not on file   • Number of children: Not on file   • Years of education: Not on file   • Highest education level: Not on file   Tobacco Use   • Smoking status: Never Smoker   • Smokeless tobacco: Never Used   Substance and Sexual Activity   • Alcohol use: Yes     Comment: occasional   • Drug use: No   • Sexual activity: Yes     Partners: Male     Birth control/protection: None           Objective   Physical Exam   Constitutional: She is oriented to person, place, and time. She appears well-developed and well-nourished. No distress.   HENT:   Head: Normocephalic and atraumatic.   Right Ear: External ear normal.   Left Ear: External ear  normal.   Nose: Nose normal.   Mouth/Throat: Oropharynx is clear and moist.   Eyes: Conjunctivae and EOM are normal. Pupils are equal, round, and reactive to light.   Neck: Normal range of motion. Neck supple.   Cardiovascular: Normal rate, regular rhythm, normal heart sounds and intact distal pulses.   Pulmonary/Chest: Breath sounds normal.   Abdominal: Soft. Bowel sounds are normal. She exhibits no distension and no mass. There is no guarding.   Mild suprapubic tenderness.   Musculoskeletal: Normal range of motion. She exhibits no edema or tenderness.   Neurological: She is alert and oriented to person, place, and time. No cranial nerve deficit or sensory deficit. She exhibits normal muscle tone.   Skin: Skin is warm and dry. She is not diaphoretic.   Psychiatric: She has a normal mood and affect. Her behavior is normal.   Nursing note and vitals reviewed.      Procedures           ED Course  ED Course as of Jun 05 0009 Wed Jun 05, 2019   0004 Patient is alert and resting comfortably. I reviewed the results of the emergency department evaluation with the patient.  I recommended OB follow-up.  I advised the patient to return to the emergency department if their symptoms change or worsen.   [DR]      ED Course User Index  [DR] Jared Camejo MD      Labs Reviewed   URINALYSIS W/ MICROSCOPIC IF INDICATED (NO CULTURE) - Abnormal; Notable for the following components:       Result Value    Appearance, UA Cloudy (*)     Leuk Esterase, UA Small (1+) (*)     All other components within normal limits   PREGNANCY, URINE - Abnormal; Notable for the following components:    HCG, Urine QL Positive (*)     All other components within normal limits   URINALYSIS, MICROSCOPIC ONLY - Abnormal; Notable for the following components:    Bacteria, UA 1+ (*)     Squamous Epithelial Cells, UA 21-30 (*)     Mucus, UA Small/1+ (*)     All other components within normal limits   COMPREHENSIVE METABOLIC PANEL - Abnormal; Notable for the  following components:    Glucose 105 (*)     All other components within normal limits    Narrative:     GFR Normal >60  Chronic Kidney Disease <60  Kidney Failure <15   CBC WITH AUTO DIFFERENTIAL - Abnormal; Notable for the following components:    WBC 11.69 (*)     Immature Grans % 0.6 (*)     Neutrophils, Absolute 8.30 (*)     Immature Grans, Absolute 0.07 (*)     All other components within normal limits   HCG, QUANTITATIVE, PREGNANCY    Narrative:     HCG Ranges by Gestational Age    3 Weeks         5.4 -      72 mIU/mL  4 Weeks        10.2 -     708 mIU/mL  5 Weeks       217   -   8,245 mIU/mL  6 Weeks       152   -  32,177 mIU/mL  7 Weeks     4,059   - 153,767 mIU/mL  8 Weeks    31,366   - 149,094 mIU/mL  9 Weeks    59,109   - 135,901 mIU/mL  10 Weeks   44,186   - 170,409 mIU/mL  12 Weeks   27,107   - 201,615 mIU/mL  14 Weeks   24,302   -  93,646 mIU/mL  15 Weeks   12,540   -  69,747 mIU/mL  16 Weeks    8,904   -  55,332 mIU/mL  17 Weeks    8,240   -  51,793 mIU/mL  18 Weeks    9,649   -  55,271 mIU/mL   ABO/RH   CBC AND DIFFERENTIAL    Narrative:     The following orders were created for panel order CBC & Differential.  Procedure                               Abnormality         Status                     ---------                               -----------         ------                     CBC Auto Differential[[id number]]        Abnormal            Final result                 Please view results for these tests on the individual orders.     Us Ob Transvaginal    Result Date: 6/4/2019  Narrative: Ultrasound OB transvaginal on 6/4/2019 CLINICAL INDICATION: Six weeks pregnant, abdominal cramping and spotting COMPARISON: None FINDINGS: Multiple sonographic images are obtained throughout the pelvis by transvaginal approach, both transverse and sagittal images are obtained. Uterus measures approximately 8.3 x 4.0 x 4.9 cm. There is a single early intrauterine pregnancy with gestational sac, yolk sac and fetal  pole. Estimated gestational age by crown-rump length measurement is an approximate six week four day gestation. Positive fetal cardiac activity is noted with fetal heart rate of 117 bpm. Gestational sac shape appears unremarkable. Gestation is too early for placental evaluation. There may be a very small subchorionic hemorrhage adjacent to the gestational sac. Left ovary measures approximately 2.3 x 2.4 x 2.1 cm. Right ovary measures approximately 2.6 x 1.6 x 3.1 cm. No adnexal mass or fluid collection is noted. No free fluid is noted.     Impression: Single early living approximately six week four day intrauterine pregnancy with possible very small subchronic hemorrhage. Electronically signed by:  Brad Dominguez  6/4/2019 11:57 PM CDT Workstation: [id number]              MDM      Final diagnoses:   Threatened miscarriage            Jared Camejo MD  06/05/19 0009

## 2019-06-05 NOTE — ED NOTES
Patient presents to ED with complaint of pregnancy related problem. She states she has had lower abdominal cramping and spotting for 2 days. 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 She states she presents here due to this increased pain with concern. She states she ran a fever last night and also has experienced diarrhea, denies vomiting.   She states she is around 6-8 weeks along. First OB appointment is 06/13/2019.     Lilliam Maharaj RN  06/04/19 2010

## 2019-06-05 NOTE — ED NOTES
Patient states she was seen Friday at her doctors and received a pap smear and they gave her antibiotics for a bacterial infection. She states she did not have the money to get the medication at the time. She states she then took pregnancy test at home and came back positive. She states she received a call from her doctor and notified them of her positive pregnancy test and asked if she can still take her antibiotics that were prescribed and states they doctor told her to go to urgent care for confirmation of pregnancy test. She states she still has not taken the antibiotics for this.      Lilliam Maharaj RN  06/04/19 2139       Lilliam Maharaj RN  06/04/19 2131

## 2019-06-13 ENCOUNTER — APPOINTMENT (OUTPATIENT)
Dept: LAB | Facility: HOSPITAL | Age: 24
End: 2019-06-13

## 2019-06-13 ENCOUNTER — INITIAL PRENATAL (OUTPATIENT)
Dept: OBSTETRICS AND GYNECOLOGY | Facility: CLINIC | Age: 24
End: 2019-06-13

## 2019-06-13 VITALS — BODY MASS INDEX: 25.38 KG/M2 | WEIGHT: 182 LBS | SYSTOLIC BLOOD PRESSURE: 114 MMHG | DIASTOLIC BLOOD PRESSURE: 68 MMHG

## 2019-06-13 DIAGNOSIS — O20.0 BLOODY SHOW AND CRAMPING IN EARLY PREGNANCY: ICD-10-CM

## 2019-06-13 DIAGNOSIS — Z67.91 RH NEGATIVE STATE IN ANTEPARTUM PERIOD, SECOND TRIMESTER: ICD-10-CM

## 2019-06-13 DIAGNOSIS — R87.610 ATYPICAL SQUAMOUS CELLS OF UNDETERMINED SIGNIFICANCE (ASCUS) ON PAPANICOLAOU SMEAR OF CERVIX: ICD-10-CM

## 2019-06-13 DIAGNOSIS — Z86.19 HISTORY OF HPV INFECTION: ICD-10-CM

## 2019-06-13 DIAGNOSIS — Z34.01 ENCOUNTER FOR SUPERVISION OF NORMAL FIRST PREGNANCY IN FIRST TRIMESTER: Primary | ICD-10-CM

## 2019-06-13 DIAGNOSIS — Z3A.01 7 WEEKS GESTATION OF PREGNANCY: Primary | ICD-10-CM

## 2019-06-13 DIAGNOSIS — O99.321 DRUG USE AFFECTING PREGNANCY IN FIRST TRIMESTER: ICD-10-CM

## 2019-06-13 DIAGNOSIS — Z87.09 PERSONAL HISTORY OF ASTHMA: ICD-10-CM

## 2019-06-13 DIAGNOSIS — O26.892 RH NEGATIVE STATE IN ANTEPARTUM PERIOD, SECOND TRIMESTER: ICD-10-CM

## 2019-06-13 DIAGNOSIS — O21.9 NAUSEA AND VOMITING DURING PREGNANCY PRIOR TO 22 WEEKS GESTATION: ICD-10-CM

## 2019-06-13 LAB
ABO GROUP BLD: NORMAL
AMPHET+METHAMPHET UR QL: NEGATIVE
ANTI-D, PASSIVE: NORMAL
BARBITURATES UR QL SCN: NEGATIVE
BASOPHILS # BLD AUTO: 0.05 10*3/MM3 (ref 0–0.2)
BASOPHILS NFR BLD AUTO: 0.5 % (ref 0–1.5)
BENZODIAZ UR QL SCN: NEGATIVE
BILIRUB UR QL STRIP: NEGATIVE
BLD GP AB SCN SERPL QL: POSITIVE
CANNABINOIDS SERPL QL: POSITIVE
CLARITY UR: CLEAR
COCAINE UR QL: NEGATIVE
COLOR UR: YELLOW
DEPRECATED RDW RBC AUTO: 43.3 FL (ref 37–54)
EOSINOPHIL # BLD AUTO: 0.07 10*3/MM3 (ref 0–0.4)
EOSINOPHIL NFR BLD AUTO: 0.6 % (ref 0.3–6.2)
ERYTHROCYTE [DISTWIDTH] IN BLOOD BY AUTOMATED COUNT: 13 % (ref 12.3–15.4)
GLUCOSE UR STRIP-MCNC: NEGATIVE MG/DL
HBV SURFACE AG SERPL QL IA: NORMAL
HCT VFR BLD AUTO: 39.6 % (ref 34–46.6)
HCV AB SER DONR QL: NORMAL
HGB BLD-MCNC: 13.1 G/DL (ref 12–15.9)
HGB UR QL STRIP.AUTO: NEGATIVE
HIV1+2 AB SER QL: NORMAL
IMM GRANULOCYTES # BLD AUTO: 0.05 10*3/MM3 (ref 0–0.05)
IMM GRANULOCYTES NFR BLD AUTO: 0.5 % (ref 0–0.5)
KETONES UR QL STRIP: NEGATIVE
LEUKOCYTE ESTERASE UR QL STRIP.AUTO: NEGATIVE
LYMPHOCYTES # BLD AUTO: 1.73 10*3/MM3 (ref 0.7–3.1)
LYMPHOCYTES NFR BLD AUTO: 15.8 % (ref 19.6–45.3)
Lab: NORMAL
MCH RBC QN AUTO: 30.3 PG (ref 26.6–33)
MCHC RBC AUTO-ENTMCNC: 33.1 G/DL (ref 31.5–35.7)
MCV RBC AUTO: 91.7 FL (ref 79–97)
METHADONE UR QL SCN: NEGATIVE
MONOCYTES # BLD AUTO: 0.71 10*3/MM3 (ref 0.1–0.9)
MONOCYTES NFR BLD AUTO: 6.5 % (ref 5–12)
NEUTROPHILS # BLD AUTO: 8.32 10*3/MM3 (ref 1.7–7)
NEUTROPHILS NFR BLD AUTO: 76.1 % (ref 42.7–76)
NITRITE UR QL STRIP: NEGATIVE
NRBC BLD AUTO-RTO: 0 /100 WBC (ref 0–0.2)
OPIATES UR QL: NEGATIVE
OXYCODONE UR QL SCN: NEGATIVE
PH UR STRIP.AUTO: 6.5 [PH] (ref 5–8)
PLATELET # BLD AUTO: 289 10*3/MM3 (ref 140–450)
PMV BLD AUTO: 10.9 FL (ref 6–12)
PROT UR QL STRIP: NEGATIVE
RBC # BLD AUTO: 4.32 10*6/MM3 (ref 3.77–5.28)
RH BLD: NEGATIVE
SP GR UR STRIP: 1.02 (ref 1–1.03)
UROBILINOGEN UR QL STRIP: NORMAL
WBC NRBC COR # BLD: 10.93 10*3/MM3 (ref 3.4–10.8)

## 2019-06-13 PROCEDURE — 80307 DRUG TEST PRSMV CHEM ANLYZR: CPT | Performed by: NURSE PRACTITIONER

## 2019-06-13 PROCEDURE — 87661 TRICHOMONAS VAGINALIS AMPLIF: CPT | Performed by: NURSE PRACTITIONER

## 2019-06-13 PROCEDURE — 36415 COLL VENOUS BLD VENIPUNCTURE: CPT

## 2019-06-13 PROCEDURE — 80081 OBSTETRIC PANEL INC HIV TSTG: CPT | Performed by: NURSE PRACTITIONER

## 2019-06-13 PROCEDURE — 81003 URINALYSIS AUTO W/O SCOPE: CPT | Performed by: NURSE PRACTITIONER

## 2019-06-13 PROCEDURE — 86870 RBC ANTIBODY IDENTIFICATION: CPT | Performed by: NURSE PRACTITIONER

## 2019-06-13 PROCEDURE — 86695 HERPES SIMPLEX TYPE 1 TEST: CPT | Performed by: NURSE PRACTITIONER

## 2019-06-13 PROCEDURE — 86803 HEPATITIS C AB TEST: CPT | Performed by: NURSE PRACTITIONER

## 2019-06-13 PROCEDURE — 86696 HERPES SIMPLEX TYPE 2 TEST: CPT | Performed by: NURSE PRACTITIONER

## 2019-06-13 PROCEDURE — 0501F PRENATAL FLOW SHEET: CPT | Performed by: NURSE PRACTITIONER

## 2019-06-13 PROCEDURE — 87491 CHLMYD TRACH DNA AMP PROBE: CPT | Performed by: NURSE PRACTITIONER

## 2019-06-13 PROCEDURE — 87591 N.GONORRHOEAE DNA AMP PROB: CPT | Performed by: NURSE PRACTITIONER

## 2019-06-13 PROCEDURE — 87086 URINE CULTURE/COLONY COUNT: CPT | Performed by: NURSE PRACTITIONER

## 2019-06-13 RX ORDER — PROMETHAZINE HYDROCHLORIDE 25 MG/1
12.5 TABLET ORAL EVERY 8 HOURS PRN
Qty: 30 TABLET | Refills: 1 | Status: SHIPPED | OUTPATIENT
Start: 2019-06-13 | End: 2019-11-14 | Stop reason: SDUPTHER

## 2019-06-13 NOTE — PROGRESS NOTES
I spent approximately 45 minutes with the patient acquiring the health and history intake and discussing topics related to healthy lifestyle. This is her first pregnancy. She has been trying to conceive. She had an ultrasound in ER on 6/4/19 that showed a possible subchorionic hemorrhage. She did receive a rhogam injection that day. She has limited knowledge of her family history. The 1st trimester teaching was done with the patient. We discussed a healthy diet and exercise and what is recommended. I also discussed Listeriosis and Toxoplasmosis and what fish to avoid due to high mercury levels. Informed patient not to be in hot tubs, saunas, or tanning beds. We discussed that spotting may occur after intercourse which is common, but if heavy bleeding like a period occurs to call [facility] or hospital if clinic is closed.  I encouraged her to make an appointment with the dentist if she has not had a dental exam and cleaning in the last 6 months. She states she has had a dental exam within last 6 months.  I instructed the patient that alcohol, illicit drug use, and tobacco smoking should be avoided in pregnancy. The patient does not smoke but discussed the importance of avoiding second hand smoke. She states she was smoking marijuana but stopped when she had positive pregnancy test. We discussed the hospital policy procedure if a patient has a positive urine drug screen at the time of admission to the hospital. She plans to breastfeed. I gave her pamphlet on breastfeeding classes and the breastfeeding mothers support group that is provided by Lilliam Willson, Lactation Consultant. We discussed the resources that is offered at the health departments, and we discussed that some health departments have a breastfeeding peer counselor. She filled out the health department referral form. She is interested in centering, breastfeeding classes, WIC, and HANDS.  I discussed lab tests will be done today. The Quad screen is  discussed and informed patient it is offered at 15-20 weeks and is optional.  I encouraged the patient to get the TDAP vaccine in the 3rd trimester. I told the patient that health departments are giving the TDAP vaccine to family members. All questions were answered at this time.

## 2019-06-14 LAB
BACTERIA SPEC AEROBE CULT: NORMAL
C TRACH RRNA CVX QL NAA+PROBE: NEGATIVE
HSV1 IGG SER IA-ACNC: 24.8 INDEX (ref 0–0.9)
HSV2 IGG SER IA-ACNC: <0.91 INDEX (ref 0–0.9)
N GONORRHOEA RRNA SPEC QL NAA+PROBE: NEGATIVE
RPR SER QL: NORMAL
RUBV IGG SERPL IA-ACNC: POSITIVE
TRICHOMONAS VAGINALIS PCR: NEGATIVE

## 2019-06-14 NOTE — PROGRESS NOTES
CC: NOB visit, hx reviewed    HPI: First pregnancy.  She has been to ED twice in past week for cramping and vaginal spotting and worried she may miscarry.  Pt reports mild asthma exacerbated by seasonal allergy flares, she has a rescue inhaler but does not use it much.  Pap on 05/17 was ASCUS and pt reports a history of having HPV infection.        ROS: +cramping less, nausea and vomitting.  Pt denies dysuria or vaginal bleeding today.    Labs:   No results found for: HGBA1C  Glucose   Date Value Ref Range Status   06/04/2019 105 (H) 65 - 99 mg/dL Final   09/21/2018 91 60 - 100 mg/dL Final   08/14/2015 115 (H) 60 - 100 mg/dl Final   08/10/2015 87 60 - 100 mg/dl Final   10/30/2014 89 60 - 100 mg/dl Final     Last Completed Pap Smear       Status Date      PAP SMEAR Done 5/17/2019 LIQUID-BASED PAP SMEAR, SCREENING     Patient has more history with this topic...          Radiology: Reviewed preliminary scan report with pt and her mother: single intrauterine fetus, gestational sac normal appearance, yolk sac visualized, fetal pole visualized    Each of the above were reviewed and integrated into prenatal care plan.    P/E:  See Vitals flow sheet    A/P: 23 y.o. #: 1, Date: None, Sex: None, Weight: None, GA: None, Delivery: None, Apgar1: None, Apgar5: None, Living: None, Birth Comments: None      1. Routine prenatal care   Encourage PNV   SAB precautions    Reviewed first trimester education, pt and mother verbalized understanding   Given on-call information     2.    Diagnosis Plan   1. 7 weeks gestation of pregnancy  US Ob Transvaginal   2. Bloody show and cramping in early pregnancy  US Ob Transvaginal  Drink plenty of water, warm shower or heating pad, tylenol prn   3. Nausea and vomiting during pregnancy prior to 22 weeks gestation  promethazine (PHENERGAN) 25 MG tablet   4. History of HPV infection     5. Atypical squamous cells of undetermined significance (ASCUS) on Papanicolaou smear of cervix  Repeat pap  05/2020   6. Personal history of asthma  Albuterol inhaler available   7. Drug use affecting pregnancy in first trimester  +mariuana use, pt quit when she had positive pregnancy test  Pt educated on risks   8. Rh negative: needs rhogam @ 28 weeks    RTC in 1 month for LUCY mccord

## 2019-06-18 DIAGNOSIS — Z3A.01 7 WEEKS GESTATION OF PREGNANCY: ICD-10-CM

## 2019-06-18 DIAGNOSIS — O20.0 BLOODY SHOW AND CRAMPING IN EARLY PREGNANCY: ICD-10-CM

## 2019-07-12 ENCOUNTER — ROUTINE PRENATAL (OUTPATIENT)
Dept: OBSTETRICS AND GYNECOLOGY | Facility: CLINIC | Age: 24
End: 2019-07-12

## 2019-07-12 VITALS — SYSTOLIC BLOOD PRESSURE: 110 MMHG | WEIGHT: 186 LBS | BODY MASS INDEX: 25.94 KG/M2 | DIASTOLIC BLOOD PRESSURE: 66 MMHG

## 2019-07-12 DIAGNOSIS — Z3A.11 11 WEEKS GESTATION OF PREGNANCY: Primary | ICD-10-CM

## 2019-07-12 DIAGNOSIS — Z67.91 RH NEGATIVE STATE IN ANTEPARTUM PERIOD, FIRST TRIMESTER: ICD-10-CM

## 2019-07-12 DIAGNOSIS — O26.891 RH NEGATIVE STATE IN ANTEPARTUM PERIOD, FIRST TRIMESTER: ICD-10-CM

## 2019-07-12 DIAGNOSIS — O21.9 NAUSEA AND VOMITING DURING PREGNANCY PRIOR TO 22 WEEKS GESTATION: ICD-10-CM

## 2019-07-12 PROCEDURE — 0502F SUBSEQUENT PRENATAL CARE: CPT | Performed by: NURSE PRACTITIONER

## 2019-07-12 NOTE — PROGRESS NOTES
CC: LUCY visit, hx reviewed     HPI: Here for follow up prenatal care.      ROS: Pt denies cramping, dysuria, or vaginal bleeding.      Labs:   No results found for: HGBA1C  Glucose   Date Value Ref Range Status   06/04/2019 105 (H) 65 - 99 mg/dL Final   09/21/2018 91 60 - 100 mg/dL Final   08/14/2015 115 (H) 60 - 100 mg/dl Final   08/10/2015 87 60 - 100 mg/dl Final   10/30/2014 89 60 - 100 mg/dl Final     Last Completed Pap Smear       Status Date      PAP SMEAR Done 5/17/2019 LIQUID-BASED PAP SMEAR, SCREENING     Patient has more history with this topic...          Radiology: v scan utilized     Each of the above were reviewed and integrated into prenatal care plan.    P/E:  See Vitals flow sheet    A/P: 23 y.o. #: 1, Date: None, Sex: None, Weight: None, GA:11w6d      1. Routine prenatal care   Encourage PNV   SAB precautions     2.    Diagnosis Plan   1. 11 weeks gestation of pregnancy     2. Rh negative state in antepartum period, first trimester     3. Nausea and vomiting during pregnancy prior to 22 weeks gestation

## 2019-07-19 ENCOUNTER — TELEPHONE (OUTPATIENT)
Dept: OBSTETRICS AND GYNECOLOGY | Facility: CLINIC | Age: 24
End: 2019-07-19

## 2019-07-20 PROCEDURE — 99285 EMERGENCY DEPT VISIT HI MDM: CPT

## 2019-07-21 ENCOUNTER — HOSPITAL ENCOUNTER (EMERGENCY)
Facility: HOSPITAL | Age: 24
Discharge: HOME | End: 2019-07-21
Attending: FAMILY MEDICINE | Admitting: FAMILY MEDICINE

## 2019-07-21 VITALS
TEMPERATURE: 98.5 F | HEART RATE: 91 BPM | RESPIRATION RATE: 18 BRPM | DIASTOLIC BLOOD PRESSURE: 64 MMHG | OXYGEN SATURATION: 97 % | HEIGHT: 71 IN | SYSTOLIC BLOOD PRESSURE: 127 MMHG | BODY MASS INDEX: 25.79 KG/M2 | WEIGHT: 184.2 LBS

## 2019-07-21 DIAGNOSIS — Z3A.13 13 WEEKS GESTATION OF PREGNANCY: Primary | ICD-10-CM

## 2019-07-21 LAB
ABO GROUP BLD: NORMAL
BACTERIA UR QL AUTO: ABNORMAL /HPF
BASOPHILS # BLD AUTO: 0.05 10*3/MM3 (ref 0–0.2)
BASOPHILS NFR BLD AUTO: 0.4 % (ref 0–1.5)
BILIRUB UR QL STRIP: NEGATIVE
CLARITY UR: ABNORMAL
COLOR UR: YELLOW
DEPRECATED RDW RBC AUTO: 40.3 FL (ref 37–54)
EOSINOPHIL # BLD AUTO: 0.03 10*3/MM3 (ref 0–0.4)
EOSINOPHIL NFR BLD AUTO: 0.2 % (ref 0.3–6.2)
ERYTHROCYTE [DISTWIDTH] IN BLOOD BY AUTOMATED COUNT: 12.6 % (ref 12.3–15.4)
GLUCOSE UR STRIP-MCNC: NEGATIVE MG/DL
HCG INTACT+B SERPL-ACNC: NORMAL MIU/ML
HCT VFR BLD AUTO: 37.6 % (ref 34–46.6)
HGB BLD-MCNC: 12.9 G/DL (ref 12–15.9)
HGB UR QL STRIP.AUTO: NEGATIVE
HOLD SPECIMEN: NORMAL
HOLD SPECIMEN: NORMAL
HYALINE CASTS UR QL AUTO: ABNORMAL /LPF
IMM GRANULOCYTES # BLD AUTO: 0.05 10*3/MM3 (ref 0–0.05)
IMM GRANULOCYTES NFR BLD AUTO: 0.4 % (ref 0–0.5)
KETONES UR QL STRIP: ABNORMAL
LEUKOCYTE ESTERASE UR QL STRIP.AUTO: ABNORMAL
LYMPHOCYTES # BLD AUTO: 1.81 10*3/MM3 (ref 0.7–3.1)
LYMPHOCYTES NFR BLD AUTO: 14.7 % (ref 19.6–45.3)
MCH RBC QN AUTO: 29.9 PG (ref 26.6–33)
MCHC RBC AUTO-ENTMCNC: 34.3 G/DL (ref 31.5–35.7)
MCV RBC AUTO: 87.2 FL (ref 79–97)
MONOCYTES # BLD AUTO: 0.47 10*3/MM3 (ref 0.1–0.9)
MONOCYTES NFR BLD AUTO: 3.8 % (ref 5–12)
MUCOUS THREADS URNS QL MICRO: ABNORMAL /HPF
NEUTROPHILS # BLD AUTO: 9.87 10*3/MM3 (ref 1.7–7)
NEUTROPHILS NFR BLD AUTO: 80.5 % (ref 42.7–76)
NITRITE UR QL STRIP: NEGATIVE
NRBC BLD AUTO-RTO: 0 /100 WBC (ref 0–0.2)
PH UR STRIP.AUTO: 5.5 [PH] (ref 5–9)
PLATELET # BLD AUTO: 250 10*3/MM3 (ref 140–450)
PMV BLD AUTO: 9.9 FL (ref 6–12)
PROT UR QL STRIP: ABNORMAL
RBC # BLD AUTO: 4.31 10*6/MM3 (ref 3.77–5.28)
RBC # UR: ABNORMAL /HPF
REF LAB TEST METHOD: ABNORMAL
RH BLD: NEGATIVE
SP GR UR STRIP: 1.03 (ref 1–1.03)
SQUAMOUS #/AREA URNS HPF: ABNORMAL /HPF
UROBILINOGEN UR QL STRIP: ABNORMAL
WBC NRBC COR # BLD: 12.28 10*3/MM3 (ref 3.4–10.8)
WBC UR QL AUTO: ABNORMAL /HPF
WHOLE BLOOD HOLD SPECIMEN: NORMAL
WHOLE BLOOD HOLD SPECIMEN: NORMAL

## 2019-07-21 PROCEDURE — 84702 CHORIONIC GONADOTROPIN TEST: CPT | Performed by: FAMILY MEDICINE

## 2019-07-21 PROCEDURE — 81001 URINALYSIS AUTO W/SCOPE: CPT | Performed by: FAMILY MEDICINE

## 2019-07-21 PROCEDURE — 85025 COMPLETE CBC W/AUTO DIFF WBC: CPT | Performed by: FAMILY MEDICINE

## 2019-07-21 PROCEDURE — 96372 THER/PROPH/DIAG INJ SC/IM: CPT

## 2019-07-21 PROCEDURE — 86900 BLOOD TYPING SEROLOGIC ABO: CPT | Performed by: FAMILY MEDICINE

## 2019-07-21 PROCEDURE — 86901 BLOOD TYPING SEROLOGIC RH(D): CPT | Performed by: FAMILY MEDICINE

## 2019-07-21 PROCEDURE — 25010000003 RHO D IMMUNE GLOBULIN 1500 UNITS SOLUTION PREFILLED SYRINGE: Performed by: FAMILY MEDICINE

## 2019-07-21 RX ORDER — SODIUM CHLORIDE 0.9 % (FLUSH) 0.9 %
10 SYRINGE (ML) INJECTION AS NEEDED
Status: DISCONTINUED | OUTPATIENT
Start: 2019-07-21 | End: 2019-07-21 | Stop reason: HOSPADM

## 2019-07-21 RX ADMIN — HUMAN RHO(D) IMMUNE GLOBULIN 300 MCG: 300 INJECTION, SOLUTION INTRAMUSCULAR at 03:16

## 2019-07-22 ENCOUNTER — TELEPHONE (OUTPATIENT)
Dept: OBSTETRICS AND GYNECOLOGY | Facility: CLINIC | Age: 24
End: 2019-07-22

## 2019-07-22 DIAGNOSIS — O46.90 VAGINAL BLEEDING DURING PREGNANCY: Primary | ICD-10-CM

## 2019-07-22 NOTE — TELEPHONE ENCOUNTER
LESIA RECEIVED FMLA PAPER WORK AND GAVE TO ME. I CALLED PATIENT AND INFORMED HER WE DO CHARGE A $20 FEE FOR FMLA/PAPER WORK. SHE VERBALIZED UNDERSTANDING AND SAID SHE WOULD PAY AT HER NEXT VISIT. LOGGED PAPERWORK IN AND PUT IN CUBBY IN SAFE ROOM.

## 2019-07-25 ENCOUNTER — ROUTINE PRENATAL (OUTPATIENT)
Dept: OBSTETRICS AND GYNECOLOGY | Facility: CLINIC | Age: 24
End: 2019-07-25

## 2019-07-25 VITALS — DIASTOLIC BLOOD PRESSURE: 62 MMHG | BODY MASS INDEX: 26.36 KG/M2 | WEIGHT: 189 LBS | SYSTOLIC BLOOD PRESSURE: 100 MMHG

## 2019-07-25 DIAGNOSIS — O26.899 RH NEGATIVE STATE IN ANTEPARTUM PERIOD: ICD-10-CM

## 2019-07-25 DIAGNOSIS — O20.9 BLEEDING IN EARLY PREGNANCY: ICD-10-CM

## 2019-07-25 DIAGNOSIS — Z67.91 RH NEGATIVE STATE IN ANTEPARTUM PERIOD: ICD-10-CM

## 2019-07-25 DIAGNOSIS — N89.8 VAGINAL DISCHARGE: Primary | ICD-10-CM

## 2019-07-25 LAB
CANDIDA ALBICANS: NEGATIVE
GARDNERELLA VAGINALIS: POSITIVE
T VAGINALIS DNA VAG QL PROBE+SIG AMP: NEGATIVE

## 2019-07-25 PROCEDURE — 87510 GARDNER VAG DNA DIR PROBE: CPT | Performed by: OBSTETRICS & GYNECOLOGY

## 2019-07-25 PROCEDURE — 87480 CANDIDA DNA DIR PROBE: CPT | Performed by: OBSTETRICS & GYNECOLOGY

## 2019-07-25 PROCEDURE — 0502F SUBSEQUENT PRENATAL CARE: CPT | Performed by: OBSTETRICS & GYNECOLOGY

## 2019-07-25 PROCEDURE — 87660 TRICHOMONAS VAGIN DIR PROBE: CPT | Performed by: OBSTETRICS & GYNECOLOGY

## 2019-07-26 RX ORDER — METRONIDAZOLE 500 MG/1
500 TABLET ORAL 2 TIMES DAILY
Qty: 14 TABLET | Refills: 0 | Status: ON HOLD | OUTPATIENT
Start: 2019-07-26 | End: 2019-08-06

## 2019-07-27 PROBLEM — Z67.91 RH NEGATIVE STATE IN ANTEPARTUM PERIOD: Status: ACTIVE | Noted: 2019-07-27

## 2019-07-27 PROBLEM — O26.899 RH NEGATIVE STATE IN ANTEPARTUM PERIOD: Status: ACTIVE | Noted: 2019-07-27

## 2019-07-27 NOTE — PROGRESS NOTES
CC: LUCY visit    Patient Active Problem List   Diagnosis   • Rh negative state in antepartum period       HPI: 23 y.o.   GA: 14w0d DELFINO: 2020, by Last Menstrual Period  Here for follow up prenatal care.  Patient presents for OB visit today was seen in the ER over the weekend with vaginal bleeding states that it was pink to bright red but very minimal.  Reassurance was given in the ER after fetal heart tones were heard.  Patient states that she does have some cramping but it is minimal states that she has a history of bacterial vaginosis but was never treated, plan to repeat swabs today.    Labs:   No results found for: HGBA1C  Glucose   Date Value Ref Range Status   2019 105 (H) 65 - 99 mg/dL Final   2018 91 60 - 100 mg/dL Final   2015 115 (H) 60 - 100 mg/dl Final   08/10/2015 87 60 - 100 mg/dl Final   10/30/2014 89 60 - 100 mg/dl Final     Last Completed Pap Smear       Status Date      PAP SMEAR Done 2019 LIQUID-BASED PAP SMEAR, SCREENING     Patient has more history with this topic...          Radiology:    Each of the above were reviewed and integrated into prenatal care plan.    P/E:  See Vitals flowsheet    A/P: 23-year-old G1, P0 at 13 weeks and 5 days with threatened , no further bleeding at this time.  Patient overall stable at this time with pregnancy progressing appropriately.  Bleeding cramping precautions given.  Patient to return in 2 weeks.  Patient to return sooner as needed.     Diagnosis Plan   1. Vaginal discharge  Gardnerella vaginalis, Trichomonas vaginalis, Candida albicans, DNA - Swab, Vagina   2. Rh negative state in antepartum period     3. Bleeding in early pregnancy

## 2019-07-30 ENCOUNTER — TELEPHONE (OUTPATIENT)
Dept: OBSTETRICS AND GYNECOLOGY | Facility: CLINIC | Age: 24
End: 2019-07-30

## 2019-07-30 NOTE — TELEPHONE ENCOUNTER
----- Message from Ekta Ortiz CSA sent at 7/30/2019  2:22 PM CDT -----  Regarding: please call this patient back  Contact: [phone]  Patient is having pain like before.  What should she do?

## 2019-08-05 DIAGNOSIS — O46.90 VAGINAL BLEEDING DURING PREGNANCY: ICD-10-CM

## 2019-08-06 ENCOUNTER — HOSPITAL ENCOUNTER (OUTPATIENT)
Facility: HOSPITAL | Age: 24
Setting detail: OBSERVATION
Discharge: HOME | End: 2019-08-09
Attending: EMERGENCY MEDICINE | Admitting: OBSTETRICS & GYNECOLOGY

## 2019-08-06 DIAGNOSIS — Z3A.15 15 WEEKS GESTATION OF PREGNANCY: ICD-10-CM

## 2019-08-06 DIAGNOSIS — R11.2 INTRACTABLE VOMITING WITH NAUSEA, UNSPECIFIED VOMITING TYPE: Primary | ICD-10-CM

## 2019-08-06 LAB
ALBUMIN SERPL-MCNC: 4.3 G/DL (ref 3.5–5.2)
ALBUMIN/GLOB SERPL: 1.2 G/DL
ALP SERPL-CCNC: 59 U/L (ref 39–117)
ALT SERPL W P-5'-P-CCNC: 11 U/L (ref 1–33)
ANION GAP SERPL CALCULATED.3IONS-SCNC: 17 MMOL/L (ref 5–15)
AST SERPL-CCNC: 15 U/L (ref 1–32)
BACTERIA UR QL AUTO: ABNORMAL /HPF
BASOPHILS # BLD AUTO: 0.06 10*3/MM3 (ref 0–0.2)
BASOPHILS NFR BLD AUTO: 0.6 % (ref 0–1.5)
BILIRUB SERPL-MCNC: 0.3 MG/DL (ref 0.2–1.2)
BILIRUB UR QL STRIP: ABNORMAL
BUN BLD-MCNC: 10 MG/DL (ref 6–20)
BUN/CREAT SERPL: 21.3 (ref 7–25)
CALCIUM SPEC-SCNC: 9.2 MG/DL (ref 8.6–10.5)
CHLORIDE SERPL-SCNC: 99 MMOL/L (ref 98–107)
CLARITY UR: ABNORMAL
CO2 SERPL-SCNC: 21 MMOL/L (ref 22–29)
COLOR UR: ABNORMAL
CREAT BLD-MCNC: 0.47 MG/DL (ref 0.57–1)
DEPRECATED RDW RBC AUTO: 40.6 FL (ref 37–54)
EOSINOPHIL # BLD AUTO: 0.04 10*3/MM3 (ref 0–0.4)
EOSINOPHIL NFR BLD AUTO: 0.4 % (ref 0.3–6.2)
ERYTHROCYTE [DISTWIDTH] IN BLOOD BY AUTOMATED COUNT: 12.7 % (ref 12.3–15.4)
GFR SERPL CREATININE-BSD FRML MDRD: >150 ML/MIN/1.73
GLOBULIN UR ELPH-MCNC: 3.6 GM/DL
GLUCOSE BLD-MCNC: 83 MG/DL (ref 65–99)
GLUCOSE UR STRIP-MCNC: NEGATIVE MG/DL
HCT VFR BLD AUTO: 41 % (ref 34–46.6)
HGB BLD-MCNC: 13.7 G/DL (ref 12–15.9)
HGB UR QL STRIP.AUTO: NEGATIVE
HOLD SPECIMEN: NORMAL
HYALINE CASTS UR QL AUTO: ABNORMAL /LPF
IMM GRANULOCYTES # BLD AUTO: 0.07 10*3/MM3 (ref 0–0.05)
IMM GRANULOCYTES NFR BLD AUTO: 0.7 % (ref 0–0.5)
KETONES UR QL STRIP: ABNORMAL
LEUKOCYTE ESTERASE UR QL STRIP.AUTO: ABNORMAL
LYMPHOCYTES # BLD AUTO: 1.26 10*3/MM3 (ref 0.7–3.1)
LYMPHOCYTES NFR BLD AUTO: 12.5 % (ref 19.6–45.3)
MAGNESIUM SERPL-MCNC: 1.9 MG/DL (ref 1.6–2.6)
MCH RBC QN AUTO: 29.4 PG (ref 26.6–33)
MCHC RBC AUTO-ENTMCNC: 33.4 G/DL (ref 31.5–35.7)
MCV RBC AUTO: 88 FL (ref 79–97)
MONOCYTES # BLD AUTO: 0.44 10*3/MM3 (ref 0.1–0.9)
MONOCYTES NFR BLD AUTO: 4.4 % (ref 5–12)
NEUTROPHILS # BLD AUTO: 8.22 10*3/MM3 (ref 1.7–7)
NEUTROPHILS NFR BLD AUTO: 81.4 % (ref 42.7–76)
NITRITE UR QL STRIP: NEGATIVE
NRBC BLD AUTO-RTO: 0 /100 WBC (ref 0–0.2)
PH UR STRIP.AUTO: 6 [PH] (ref 5–9)
PLATELET # BLD AUTO: 251 10*3/MM3 (ref 140–450)
PMV BLD AUTO: 9.6 FL (ref 6–12)
POTASSIUM BLD-SCNC: 3 MMOL/L (ref 3.5–5.2)
PROT SERPL-MCNC: 7.9 G/DL (ref 6–8.5)
PROT UR QL STRIP: ABNORMAL
RBC # BLD AUTO: 4.66 10*6/MM3 (ref 3.77–5.28)
RBC # UR: ABNORMAL /HPF
REF LAB TEST METHOD: ABNORMAL
SODIUM BLD-SCNC: 137 MMOL/L (ref 136–145)
SP GR UR STRIP: 1.03 (ref 1–1.03)
SQUAMOUS #/AREA URNS HPF: ABNORMAL /HPF
UROBILINOGEN UR QL STRIP: ABNORMAL
WBC NRBC COR # BLD: 10.09 10*3/MM3 (ref 3.4–10.8)
WBC UR QL AUTO: ABNORMAL /HPF
WHOLE BLOOD HOLD SPECIMEN: NORMAL

## 2019-08-06 PROCEDURE — 99219 PR INITIAL OBSERVATION CARE/DAY 50 MINUTES: CPT | Performed by: OBSTETRICS & GYNECOLOGY

## 2019-08-06 PROCEDURE — 80053 COMPREHEN METABOLIC PANEL: CPT | Performed by: STUDENT IN AN ORGANIZED HEALTH CARE EDUCATION/TRAINING PROGRAM

## 2019-08-06 PROCEDURE — 83735 ASSAY OF MAGNESIUM: CPT | Performed by: STUDENT IN AN ORGANIZED HEALTH CARE EDUCATION/TRAINING PROGRAM

## 2019-08-06 PROCEDURE — 96376 TX/PRO/DX INJ SAME DRUG ADON: CPT

## 2019-08-06 PROCEDURE — G0378 HOSPITAL OBSERVATION PER HR: HCPCS

## 2019-08-06 PROCEDURE — 81001 URINALYSIS AUTO W/SCOPE: CPT | Performed by: STUDENT IN AN ORGANIZED HEALTH CARE EDUCATION/TRAINING PROGRAM

## 2019-08-06 PROCEDURE — 96374 THER/PROPH/DIAG INJ IV PUSH: CPT

## 2019-08-06 PROCEDURE — 25010000002 PROMETHAZINE PER 50 MG: Performed by: STUDENT IN AN ORGANIZED HEALTH CARE EDUCATION/TRAINING PROGRAM

## 2019-08-06 PROCEDURE — 25010000002 MAGNESIUM SULFATE IN D5W 1G/100ML (PREMIX) 1-5 GM/100ML-% SOLUTION: Performed by: STUDENT IN AN ORGANIZED HEALTH CARE EDUCATION/TRAINING PROGRAM

## 2019-08-06 PROCEDURE — 99284 EMERGENCY DEPT VISIT MOD MDM: CPT

## 2019-08-06 PROCEDURE — 85025 COMPLETE CBC W/AUTO DIFF WBC: CPT | Performed by: STUDENT IN AN ORGANIZED HEALTH CARE EDUCATION/TRAINING PROGRAM

## 2019-08-06 PROCEDURE — 25810000003 SODIUM CHLORIDE 0.9 % WITH KCL 40 MEQ/L 40-0.9 MEQ/L-% SOLUTION: Performed by: STUDENT IN AN ORGANIZED HEALTH CARE EDUCATION/TRAINING PROGRAM

## 2019-08-06 RX ORDER — LIDOCAINE HYDROCHLORIDE 10 MG/ML
5 INJECTION, SOLUTION EPIDURAL; INFILTRATION; INTRACAUDAL; PERINEURAL AS NEEDED
Status: DISCONTINUED | OUTPATIENT
Start: 2019-08-06 | End: 2019-08-09 | Stop reason: HOSPADM

## 2019-08-06 RX ORDER — PROMETHAZINE HYDROCHLORIDE 25 MG/ML
25 INJECTION, SOLUTION INTRAMUSCULAR; INTRAVENOUS ONCE
Status: COMPLETED | OUTPATIENT
Start: 2019-08-06 | End: 2019-08-06

## 2019-08-06 RX ORDER — PROMETHAZINE HYDROCHLORIDE 25 MG/ML
12.5 INJECTION, SOLUTION INTRAMUSCULAR; INTRAVENOUS ONCE
Status: COMPLETED | OUTPATIENT
Start: 2019-08-06 | End: 2019-08-06

## 2019-08-06 RX ORDER — FAMOTIDINE 10 MG/ML
10 INJECTION, SOLUTION INTRAVENOUS 2 TIMES DAILY
Status: DISCONTINUED | OUTPATIENT
Start: 2019-08-07 | End: 2019-08-06

## 2019-08-06 RX ORDER — ALBUTEROL SULFATE 2.5 MG/3ML
2.5 SOLUTION RESPIRATORY (INHALATION) EVERY 6 HOURS PRN
Status: DISCONTINUED | OUTPATIENT
Start: 2019-08-06 | End: 2019-08-09 | Stop reason: HOSPADM

## 2019-08-06 RX ORDER — SODIUM CHLORIDE AND POTASSIUM CHLORIDE 300; 900 MG/100ML; MG/100ML
125 INJECTION, SOLUTION INTRAVENOUS CONTINUOUS
Status: DISCONTINUED | OUTPATIENT
Start: 2019-08-06 | End: 2019-08-09 | Stop reason: HOSPADM

## 2019-08-06 RX ORDER — SODIUM CHLORIDE, SODIUM LACTATE, POTASSIUM CHLORIDE, CALCIUM CHLORIDE 600; 310; 30; 20 MG/100ML; MG/100ML; MG/100ML; MG/100ML
125 INJECTION, SOLUTION INTRAVENOUS CONTINUOUS
Status: DISCONTINUED | OUTPATIENT
Start: 2019-08-07 | End: 2019-08-09 | Stop reason: HOSPADM

## 2019-08-06 RX ORDER — ONDANSETRON 4 MG/1
8 TABLET, FILM COATED ORAL 3 TIMES DAILY
Status: DISCONTINUED | OUTPATIENT
Start: 2019-08-07 | End: 2019-08-07

## 2019-08-06 RX ORDER — DIPHENHYDRAMINE HCL 25 MG
25 CAPSULE ORAL EVERY 4 HOURS PRN
Status: DISCONTINUED | OUTPATIENT
Start: 2019-08-06 | End: 2019-08-09 | Stop reason: HOSPADM

## 2019-08-06 RX ORDER — ONDANSETRON 2 MG/ML
4 INJECTION INTRAMUSCULAR; INTRAVENOUS EVERY 6 HOURS PRN
Status: DISCONTINUED | OUTPATIENT
Start: 2019-08-06 | End: 2019-08-06

## 2019-08-06 RX ORDER — MAGNESIUM SULFATE 1 G/100ML
1 INJECTION INTRAVENOUS ONCE
Status: COMPLETED | OUTPATIENT
Start: 2019-08-06 | End: 2019-08-06

## 2019-08-06 RX ORDER — ONDANSETRON 2 MG/ML
8 INJECTION INTRAMUSCULAR; INTRAVENOUS ONCE
Status: DISCONTINUED | OUTPATIENT
Start: 2019-08-06 | End: 2019-08-06

## 2019-08-06 RX ORDER — SCOLOPAMINE TRANSDERMAL SYSTEM 1 MG/1
1 PATCH, EXTENDED RELEASE TRANSDERMAL
Status: DISCONTINUED | OUTPATIENT
Start: 2019-08-07 | End: 2019-08-09 | Stop reason: HOSPADM

## 2019-08-06 RX ORDER — ACETAMINOPHEN 325 MG/1
650 TABLET ORAL ONCE
Status: COMPLETED | OUTPATIENT
Start: 2019-08-06 | End: 2019-08-06

## 2019-08-06 RX ORDER — SODIUM CHLORIDE 0.9 % (FLUSH) 0.9 %
3 SYRINGE (ML) INJECTION EVERY 12 HOURS SCHEDULED
Status: DISCONTINUED | OUTPATIENT
Start: 2019-08-07 | End: 2019-08-09 | Stop reason: HOSPADM

## 2019-08-06 RX ORDER — SODIUM CHLORIDE 0.9 % (FLUSH) 0.9 %
3-10 SYRINGE (ML) INJECTION AS NEEDED
Status: DISCONTINUED | OUTPATIENT
Start: 2019-08-06 | End: 2019-08-09 | Stop reason: HOSPADM

## 2019-08-06 RX ORDER — POTASSIUM CHLORIDE 750 MG/1
40 CAPSULE, EXTENDED RELEASE ORAL ONCE
Status: DISCONTINUED | OUTPATIENT
Start: 2019-08-06 | End: 2019-08-06

## 2019-08-06 RX ORDER — ACETAMINOPHEN 325 MG/1
650 TABLET ORAL EVERY 4 HOURS PRN
Status: DISCONTINUED | OUTPATIENT
Start: 2019-08-06 | End: 2019-08-09 | Stop reason: HOSPADM

## 2019-08-06 RX ORDER — PROMETHAZINE HYDROCHLORIDE 25 MG/ML
12.5 INJECTION, SOLUTION INTRAMUSCULAR; INTRAVENOUS 3 TIMES DAILY
Status: DISCONTINUED | OUTPATIENT
Start: 2019-08-07 | End: 2019-08-08

## 2019-08-06 RX ADMIN — ACETAMINOPHEN 650 MG: 325 TABLET, FILM COATED ORAL at 23:53

## 2019-08-06 RX ADMIN — ACETAMINOPHEN 650 MG: 325 TABLET, FILM COATED ORAL at 21:12

## 2019-08-06 RX ADMIN — PROMETHAZINE HYDROCHLORIDE 25 MG: 25 INJECTION INTRAMUSCULAR; INTRAVENOUS at 21:12

## 2019-08-06 RX ADMIN — SCOPALAMINE 1 PATCH: 1 PATCH, EXTENDED RELEASE TRANSDERMAL at 23:43

## 2019-08-06 RX ADMIN — PROMETHAZINE HYDROCHLORIDE 12.5 MG: 25 INJECTION INTRAMUSCULAR; INTRAVENOUS at 23:53

## 2019-08-06 RX ADMIN — SODIUM CHLORIDE 1000 ML: 9 INJECTION, SOLUTION INTRAVENOUS at 19:35

## 2019-08-06 RX ADMIN — PROMETHAZINE HYDROCHLORIDE 12.5 MG: 25 INJECTION INTRAMUSCULAR; INTRAVENOUS at 19:35

## 2019-08-06 RX ADMIN — POTASSIUM CHLORIDE AND SODIUM CHLORIDE 125 ML/HR: 900; 300 INJECTION, SOLUTION INTRAVENOUS at 21:11

## 2019-08-06 RX ADMIN — MAGNESIUM SULFATE HEPTAHYDRATE 1 G: 1 INJECTION, SOLUTION INTRAVENOUS at 21:11

## 2019-08-06 NOTE — ED PROVIDER NOTES
Subjective   23 year old  female at 15w3d gestation presents with 3 days of worsening nausea and vomiting. She reports 9 episodes of vomiting today since 3:30 PM. She has had intermittent diarrhea as well but states she has had her gallbladder removed and normally has intermittent diarrhea; she has had no change in frequency or consistency of stool. She reports mild abdominal cramps but no pain or contractions. She denies vaginal bleeding. She reports she has not been able to feel the baby move yet in the pregnancy. She follows with Dr. Colon as her OB/GYN. She states she has vomited 2-3 times every day since the beginning of her pregnancy. She has tried phenergan with this current episode of vomiting but states it did not help. She has contacted her OB/GYN's office where she was told if her vomiting got worse to come to the ER for evaluation and IV hydration. She also reports a headache which is different than her normal headache. She states pain is located behind her right eye and does not radiate. It is sharp. She has photo and phonophobia with it. She has tried tylenol without relief of headache.            Review of Systems   Constitutional: Negative for activity change, appetite change, chills, fatigue and fever.   HENT: Negative for hearing loss, sneezing, sore throat and trouble swallowing.    Eyes: Positive for photophobia and visual disturbance.   Respiratory: Negative for cough, chest tightness and shortness of breath.    Cardiovascular: Negative for chest pain, palpitations and leg swelling.   Gastrointestinal: Positive for diarrhea, nausea and vomiting. Negative for abdominal pain, blood in stool and constipation.   Genitourinary: Negative for difficulty urinating, dysuria, frequency, hematuria, pelvic pain, urgency, vaginal bleeding, vaginal discharge and vaginal pain.   Musculoskeletal: Negative for back pain and myalgias.   Skin: Negative for pallor and rash.   Allergic/Immunologic: Negative  for environmental allergies and food allergies.   Neurological: Positive for headaches. Negative for dizziness, light-headedness and numbness.   Psychiatric/Behavioral: Negative for agitation, confusion, hallucinations and suicidal ideas.       Past Medical History:   Diagnosis Date   • Abnormal Pap smear of cervix    • Acute maxillary sinusitis    • Acute otitis externa    • Acute pharyngitis    • Acute tonsillitis    • Allergic asthma     IgE-mediated allergic asthma     • Allergic rhinitis    • Allergic rhinitis due to pollen    • Anxiety    • Asthma    • Chondromalacia of patella     left knee    • Common cold    • Cough    • Diarrhea    • Disorder of gallbladder     Probable biliary dyskinesia    • Epigastric pain    • Foot pain    • Gastroenteritis    • Generalized abdominal pain    • Headache    • History of colonoscopy 03/27/2013    Colon endoscopy 63487 (1)  -  Internal & external hemorrhoids found.   • History of esophagogastroduodenoscopy (EGD) 03/27/2013    w/ tube 22697 (1)  -  Normal esophagus. Gastritis in stomach. Biopsy taken. Normal duodenum. Biospy taken   • History of marijuana use    • HPV (human papilloma virus) infection    • IBS (irritable bowel syndrome)    • Influenza    • Irregular periods    • Irritable bowel syndrome    • Migraine    • Nausea    • Nausea and vomiting    • Osgood-Schlatter's disease    • Pain in throat    • Patellar tendonitis    • Right upper quadrant pain    • Streptococcal sore throat    • Temporomandibular joint disorder     WISDOM TEETH    • Upper respiratory infection    • Urgency of urination    • Urinary tract infectious disease    • Varicella    • Viral gastroenteritis    • Vulvovaginitis        Allergies   Allergen Reactions   • Aspirin Shortness Of Breath     TIGHT CHEST   • Codeine Shortness Of Breath, Itching and Rash   • Naproxen Shortness Of Breath     Tightness of chest   • Erythromycin Base Rash   • Latex Rash     Rash        Past Surgical History:    Procedure Laterality Date   • CHOLECYSTECTOMY  06/06/2013    Laparoscopic  -  laparoscopic cholecystectomy with intraoperative cholangiogram. Cholecystitis.   • INJECTION OF MEDICATION  01/08/2013    Toradol (1)   -  W. Sotomayor   • LAPAROSCOPIC CHOLECYSTECTOMY     • WISDOM TOOTH EXTRACTION         Family History   Adopted: Yes   Problem Relation Age of Onset   • Cancer Other    • Diabetes Other    • Heart disease Other    • Hypertension Other    • Stroke Other    • Lung disease Other    • Cholelithiasis Other    • Ulcers Other    • Other Other         Colon problems   • Ovarian cysts Mother    • Bipolar disorder Mother    • Irritable bowel syndrome Mother    • Ovarian cancer Mother    • No Known Problems Sister    • Emphysema Maternal Grandmother    • Heart attack Maternal Grandfather    • No Known Problems Sister    • No Known Problems Sister        Social History     Socioeconomic History   • Marital status:      Spouse name: Not on file   • Number of children: Not on file   • Years of education: Not on file   • Highest education level: Not on file   Tobacco Use   • Smoking status: Never Smoker   • Smokeless tobacco: Never Used   Substance and Sexual Activity   • Alcohol use: Yes     Comment: occasional   • Drug use: Yes     Types: Marijuana     Comment: stopped with positive pregnancy test    • Sexual activity: Yes     Partners: Male     Birth control/protection: None     Comment: last pap smear 5/17/19 ASCUS            Objective   Physical Exam   Constitutional: She is oriented to person, place, and time. She appears well-developed and well-nourished. No distress.   HENT:   Head: Normocephalic and atraumatic.   Right Ear: External ear normal.   Left Ear: External ear normal.   Neck: Normal range of motion. Neck supple.   Cardiovascular: Normal rate, regular rhythm and normal heart sounds.   Pulmonary/Chest: Effort normal and breath sounds normal. No respiratory distress.   Abdominal: Soft. Bowel sounds  are normal. She exhibits no distension. There is no tenderness.   Gravid abdomen, no tenderness to palpation   Musculoskeletal: Normal range of motion. She exhibits no edema.   Neurological: She is alert and oriented to person, place, and time. She has normal reflexes.   Skin: Skin is warm and dry. She is not diaphoretic. No erythema.   Psychiatric: She has a normal mood and affect. Her behavior is normal.   Nursing note and vitals reviewed.      Procedures    Lab Results (last 24 hours)     Procedure Component Value Units Date/Time    Comprehensive Metabolic Panel [[id number]]  (Abnormal) Collected:  08/06/19 1926    Specimen:  Blood Updated:  08/06/19 1949     Glucose 83 mg/dL      BUN 10 mg/dL      Creatinine 0.47 mg/dL      Sodium 137 mmol/L      Potassium 3.0 mmol/L      Chloride 99 mmol/L      CO2 21.0 mmol/L      Calcium 9.2 mg/dL      Total Protein 7.9 g/dL      Albumin 4.30 g/dL      ALT (SGPT) 11 U/L      AST (SGOT) 15 U/L      Alkaline Phosphatase 59 U/L      Total Bilirubin 0.3 mg/dL      eGFR Non African Amer >150 mL/min/1.73      Globulin 3.6 gm/dL      A/G Ratio 1.2 g/dL      BUN/Creatinine Ratio 21.3     Anion Gap 17.0 mmol/L     Narrative:       GFR Normal >60  Chronic Kidney Disease <60  Kidney Failure <15    Magnesium [[id number]]  (Normal) Collected:  08/06/19 1926    Specimen:  Blood Updated:  08/06/19 1949     Magnesium 1.9 mg/dL     CBC & Differential [[id number]] Collected:  08/06/19 1926    Specimen:  Blood Updated:  08/06/19 1934    Narrative:       The following orders were created for panel order CBC & Differential.  Procedure                               Abnormality         Status                     ---------                               -----------         ------                     CBC Auto Differential[[id number]]        Abnormal            Final result                 Please view results for these tests on the individual orders.    CBC Auto Differential [[id number]]  (Abnormal)  Collected:  08/06/19 1926    Specimen:  Blood Updated:  08/06/19 1934     WBC 10.09 10*3/mm3      RBC 4.66 10*6/mm3      Hemoglobin 13.7 g/dL      Hematocrit 41.0 %      MCV 88.0 fL      MCH 29.4 pg      MCHC 33.4 g/dL      RDW 12.7 %      RDW-SD 40.6 fl      MPV 9.6 fL      Platelets 251 10*3/mm3      Neutrophil % 81.4 %      Lymphocyte % 12.5 %      Monocyte % 4.4 %      Eosinophil % 0.4 %      Basophil % 0.6 %      Immature Grans % 0.7 %      Neutrophils, Absolute 8.22 10*3/mm3      Lymphocytes, Absolute 1.26 10*3/mm3      Monocytes, Absolute 0.44 10*3/mm3      Eosinophils, Absolute 0.04 10*3/mm3      Basophils, Absolute 0.06 10*3/mm3      Immature Grans, Absolute 0.07 10*3/mm3      nRBC 0.0 /100 WBC     Extra Tubes [[id number]] Collected:  08/06/19 1931    Specimen:  Blood, Venous Line Updated:  08/06/19 1931    Narrative:       The following orders were created for panel order Extra Tubes.  Procedure                               Abnormality         Status                     ---------                               -----------         ------                     [facility][[id number]]                                   In process                   Please view results for these tests on the individual orders.    [facility] [[id number]] Collected:  08/06/19 1931    Specimen:  Blood Updated:  08/06/19 1931    Extra Tubes [[id number]] Collected:  08/06/19 1930    Specimen:  Blood, Venous Line Updated:  08/06/19 1931    Narrative:       The following orders were created for panel order Extra Tubes.  Procedure                               Abnormality         Status                     ---------                               -----------         ------                     Light Blue [facility][[id number]]                                   In process                 Red Top[[id number]]                                                                       Please view results for these tests on the individual orders.    Light  [facility] [[id number]] Collected:  08/06/19 1930    Specimen:  Blood Updated:  08/06/19 1930              Doppler ultrasound performed via ultrasound machine. Baby visualized, heart beat visualized. Heart rate found at 135-140 during exam.      ED Course        Patient received 12.5 mg of phenergan, and was still nauseated. She then received 25 mg of phenergan and continued to be nauseated. She was having dry heaves and stated she had vomited 2 times since being brought to her room in the ER. Labs were drawn with results as above. Magnesium 1 gm IV was given. Oral potassium supplement was trialed but she was unable to tolerate. Spoke with OB/GYN on call Dr. Colon who recommended zofran 8 mg IV and agreed to accept patient for observation overnight on mother/baby unit.          MDM  Number of Diagnoses or Management Options  15 weeks gestation of pregnancy: established and improving  Intractable vomiting with nausea, unspecified vomiting type: established and worsening     Amount and/or Complexity of Data Reviewed  Clinical lab tests: reviewed and ordered    Risk of Complications, Morbidity, and/or Mortality  Presenting problems: moderate  Diagnostic procedures: low  Management options: low    Patient Progress  Patient progress: stable        Final diagnoses:   Intractable vomiting with nausea, unspecified vomiting type   15 weeks gestation of pregnancy         Isabel Morris MD PGY3  [facility]  This document has been electronically signed by Isabel Morris MD on August 6, 2019 9:02 PM           Isabel Morris MD  Resident  08/06/19 1791

## 2019-08-07 LAB
AMPHET+METHAMPHET UR QL: NEGATIVE
AMPHETAMINES UR QL: NEGATIVE
BARBITURATES UR QL SCN: NEGATIVE
BENZODIAZ UR QL SCN: NEGATIVE
BUPRENORPHINE SERPL-MCNC: NEGATIVE NG/ML
CANNABINOIDS SERPL QL: POSITIVE
COCAINE UR QL: NEGATIVE
DEPRECATED RDW RBC AUTO: 41.9 FL (ref 37–54)
ERYTHROCYTE [DISTWIDTH] IN BLOOD BY AUTOMATED COUNT: 12.9 % (ref 12.3–15.4)
HCT VFR BLD AUTO: 37.5 % (ref 34–46.6)
HGB BLD-MCNC: 12.9 G/DL (ref 12–15.9)
MCH RBC QN AUTO: 30.5 PG (ref 26.6–33)
MCHC RBC AUTO-ENTMCNC: 34.4 G/DL (ref 31.5–35.7)
MCV RBC AUTO: 88.7 FL (ref 79–97)
METHADONE UR QL SCN: NEGATIVE
OPIATES UR QL: NEGATIVE
OXYCODONE UR QL SCN: NEGATIVE
PCP UR QL SCN: NEGATIVE
PLATELET # BLD AUTO: 234 10*3/MM3 (ref 140–450)
PMV BLD AUTO: 9.8 FL (ref 6–12)
PROPOXYPH UR QL: NEGATIVE
RBC # BLD AUTO: 4.23 10*6/MM3 (ref 3.77–5.28)
TRICYCLICS UR QL SCN: NEGATIVE
WBC NRBC COR # BLD: 8.72 10*3/MM3 (ref 3.4–10.8)

## 2019-08-07 PROCEDURE — G0378 HOSPITAL OBSERVATION PER HR: HCPCS

## 2019-08-07 PROCEDURE — 25010000002 PROMETHAZINE PER 50 MG: Performed by: OBSTETRICS & GYNECOLOGY

## 2019-08-07 PROCEDURE — 99224 PR SBSQ OBSERVATION CARE/DAY 15 MINUTES: CPT | Performed by: OBSTETRICS & GYNECOLOGY

## 2019-08-07 PROCEDURE — 85027 COMPLETE CBC AUTOMATED: CPT | Performed by: OBSTETRICS & GYNECOLOGY

## 2019-08-07 PROCEDURE — 80306 DRUG TEST PRSMV INSTRMNT: CPT | Performed by: OBSTETRICS & GYNECOLOGY

## 2019-08-07 PROCEDURE — 25010000002 THIAMINE PER 100 MG: Performed by: OBSTETRICS & GYNECOLOGY

## 2019-08-07 PROCEDURE — 96361 HYDRATE IV INFUSION ADD-ON: CPT

## 2019-08-07 PROCEDURE — 96376 TX/PRO/DX INJ SAME DRUG ADON: CPT

## 2019-08-07 PROCEDURE — G0480 DRUG TEST DEF 1-7 CLASSES: HCPCS | Performed by: OBSTETRICS & GYNECOLOGY

## 2019-08-07 RX ORDER — ONDANSETRON 4 MG/1
8 TABLET, FILM COATED ORAL 3 TIMES DAILY
Status: DISCONTINUED | OUTPATIENT
Start: 2019-08-07 | End: 2019-08-09 | Stop reason: HOSPADM

## 2019-08-07 RX ADMIN — PROMETHAZINE HYDROCHLORIDE 12.5 MG: 25 INJECTION INTRAMUSCULAR; INTRAVENOUS at 23:30

## 2019-08-07 RX ADMIN — ONDANSETRON 8 MG: 4 TABLET, FILM COATED ORAL at 04:46

## 2019-08-07 RX ADMIN — SODIUM CHLORIDE, POTASSIUM CHLORIDE, SODIUM LACTATE AND CALCIUM CHLORIDE 125 ML/HR: 600; 310; 30; 20 INJECTION, SOLUTION INTRAVENOUS at 15:08

## 2019-08-07 RX ADMIN — ONDANSETRON HYDROCHLORIDE 8 MG: 4 TABLET, FILM COATED ORAL at 18:58

## 2019-08-07 RX ADMIN — SODIUM CHLORIDE, POTASSIUM CHLORIDE, SODIUM LACTATE AND CALCIUM CHLORIDE 125 ML/HR: 600; 310; 30; 20 INJECTION, SOLUTION INTRAVENOUS at 23:54

## 2019-08-07 RX ADMIN — PROMETHAZINE HYDROCHLORIDE 12.5 MG: 25 INJECTION INTRAMUSCULAR; INTRAVENOUS at 08:15

## 2019-08-07 RX ADMIN — ONDANSETRON 8 MG: 4 TABLET, FILM COATED ORAL at 10:58

## 2019-08-07 RX ADMIN — ACETAMINOPHEN 650 MG: 325 TABLET, FILM COATED ORAL at 15:14

## 2019-08-07 RX ADMIN — FOLIC ACID 125 ML/HR: 5 INJECTION, SOLUTION INTRAMUSCULAR; INTRAVENOUS; SUBCUTANEOUS at 08:15

## 2019-08-07 RX ADMIN — PROMETHAZINE HYDROCHLORIDE 12.5 MG: 25 INJECTION INTRAMUSCULAR; INTRAVENOUS at 15:08

## 2019-08-07 NOTE — PLAN OF CARE
Problem: Patient Care Overview  Goal: Plan of Care Review  Outcome: Ongoing (interventions implemented as appropriate)   08/07/19 0104   Coping/Psychosocial   Plan of Care Reviewed With patient   Plan of Care Review   Progress no change   OTHER   Outcome Summary pt still having some intermittent n/v, alternating hyperemesis meds, continuing IVF and clear liq diet     Goal: Individualization and Mutuality  Outcome: Ongoing (interventions implemented as appropriate)    Goal: Discharge Needs Assessment  Outcome: Ongoing (interventions implemented as appropriate)    Goal: Interprofessional Rounds/Family Conf  Outcome: Ongoing (interventions implemented as appropriate)      Problem: Fluid Volume Deficit (Adult)  Goal: Identify Related Risk Factors and Signs and Symptoms  Outcome: Ongoing (interventions implemented as appropriate)      Problem: Hyperemesis Gravidarum (Adult,Obstetrics,Pediatric)  Goal: Signs and Symptoms of Listed Potential Problems Will be Absent, Minimized or Managed (Hyperemesis Gravidarum)  Outcome: Ongoing (interventions implemented as appropriate)

## 2019-08-07 NOTE — PLAN OF CARE
Problem: Patient Care Overview  Goal: Plan of Care Review  Outcome: Ongoing (interventions implemented as appropriate)   08/07/19 0521   Coping/Psychosocial   Plan of Care Reviewed With patient   Plan of Care Review   Progress improving   OTHER   Outcome Summary pt reports periods of nausea, no vomiting noted, waiting for void for uds, start banana bag and lr in am. rotating zofran and phenergan for nausea, pt states nausea and vomiting improving     Goal: Individualization and Mutuality  Outcome: Ongoing (interventions implemented as appropriate)    Goal: Discharge Needs Assessment  Outcome: Ongoing (interventions implemented as appropriate)    Goal: Interprofessional Rounds/Family Conf  Outcome: Ongoing (interventions implemented as appropriate)      Problem: Fluid Volume Deficit (Adult)  Goal: Identify Related Risk Factors and Signs and Symptoms  Outcome: Revised    Goal: Optimal Fluid Balance  Outcome: Revised      Problem: Hyperemesis Gravidarum (Adult,Obstetrics,Pediatric)  Goal: Signs and Symptoms of Listed Potential Problems Will be Absent, Minimized or Managed (Hyperemesis Gravidarum)  Outcome: Ongoing (interventions implemented as appropriate)

## 2019-08-07 NOTE — PROGRESS NOTES
[facility]  Millie Quevedo  : 1995  MRN: [id number]  CSN: [id number]    L&D Triage note    Subjective   Pt resting in bed with minimal nausea. She vomited x 2 overnight. IVF infusing. Denies vaginal d/c, or abd cramping.     Min/max vitals past 24 hours:  Temp  Min: 97.8 °F (36.6 °C)  Max: 98.1 °F (36.7 °C)   BP  Min: 100/58  Max: 125/70   Pulse  Min: 73  Max: 99   Resp  Min: 16  Max: 20    Lab Results (most recent)     Procedure Component Value Units Date/Time    CBC (No Diff) [[id number]]  (Normal) Collected:  19    Specimen:  Blood Updated:  19     WBC 8.72 10*3/mm3      RBC 4.23 10*6/mm3      Hemoglobin 12.9 g/dL      Hematocrit 37.5 %      MCV 88.7 fL      MCH 30.5 pg      MCHC 34.4 g/dL      RDW 12.9 %      RDW-SD 41.9 fl      MPV 9.8 fL      Platelets 234 10*3/mm3     Urinalysis With Microscopic If Indicated (No Culture) - Urine, Clean Catch [[id number]]  (Abnormal) Collected:  19    Specimen:  Urine, Clean Catch Updated:  19     Color, UA Dark Yellow     Appearance, UA Cloudy     pH, UA 6.0     Specific Gravity, UA 1.031     Comment: Result obtained by Refractometer        Glucose, UA Negative     Ketones, UA 80 mg/dL (3+)     Bilirubin, UA Small (1+)     Blood, UA Negative     Protein, UA Trace     Leuk Esterase, UA Small (1+)     Nitrite, UA Negative     Urobilinogen, UA 0.2 E.U./dL    Urinalysis, Microscopic Only - Urine, Clean Catch [[id number]]  (Abnormal) Collected:  19    Specimen:  Urine, Clean Catch Updated:  19     RBC, UA 3-5 /HPF      WBC, UA 6-12 /HPF      Bacteria, UA 1+ /HPF      Squamous Epithelial Cells, UA 6-12 /HPF      Hyaline Casts, UA 7-12 /LPF      Methodology Automated Microscopy    Extra Tubes [[id number]] Collected:  19    Specimen:  Blood, Venous Line Updated:  19    Narrative:       The following orders were created for panel order Extra Tubes.  Procedure                                Abnormality         Status                     ---------                               -----------         ------                     Light Blue Top[[id number]]                                   Final result               Red Top[[id number]]                                                                       Please view results for these tests on the individual orders.    Light Blue Top [[id number]] Collected:  08/06/19 1930    Specimen:  Blood Updated:  08/06/19 2106     Extra Tube hold for add-on     Comment: Auto resulted       Extra Tubes [[id number]] Collected:  08/06/19 1931    Specimen:  Blood, Venous Line Updated:  08/06/19 2106    Narrative:       The following orders were created for panel order Extra Tubes.  Procedure                               Abnormality         Status                     ---------                               -----------         ------                     Gold [facility][[id number]]                                   Final result                 Please view results for these tests on the individual orders.    [facility] [[id number]] Collected:  08/06/19 1931    Specimen:  Blood Updated:  08/06/19 2106     Extra Tube Hold for add-ons.     Comment: Auto resulted.       Comprehensive Metabolic Panel [[id number]]  (Abnormal) Collected:  08/06/19 1926    Specimen:  Blood Updated:  08/06/19 1949     Glucose 83 mg/dL      BUN 10 mg/dL      Creatinine 0.47 mg/dL      Sodium 137 mmol/L      Potassium 3.0 mmol/L      Chloride 99 mmol/L      CO2 21.0 mmol/L      Calcium 9.2 mg/dL      Total Protein 7.9 g/dL      Albumin 4.30 g/dL      ALT (SGPT) 11 U/L      AST (SGOT) 15 U/L      Alkaline Phosphatase 59 U/L      Total Bilirubin 0.3 mg/dL      eGFR Non African Amer >150 mL/min/1.73      Globulin 3.6 gm/dL      A/G Ratio 1.2 g/dL      BUN/Creatinine Ratio 21.3     Anion Gap 17.0 mmol/L     Narrative:       GFR Normal >60  Chronic Kidney Disease <60  Kidney Failure <15    Magnesium  [[id number]]  (Normal) Collected:  19    Specimen:  Blood Updated:  19     Magnesium 1.9 mg/dL     CBC & Differential [[id number]] Collected:  19    Specimen:  Blood Updated:  19    Narrative:       The following orders were created for panel order CBC & Differential.  Procedure                               Abnormality         Status                     ---------                               -----------         ------                     CBC Auto Differential[[id number]]        Abnormal            Final result                 Please view results for these tests on the individual orders.    CBC Auto Differential [[id number]]  (Abnormal) Collected:  19    Specimen:  Blood Updated:  19     WBC 10.09 10*3/mm3      RBC 4.66 10*6/mm3      Hemoglobin 13.7 g/dL      Hematocrit 41.0 %      MCV 88.0 fL      MCH 29.4 pg      MCHC 33.4 g/dL      RDW 12.7 %      RDW-SD 40.6 fl      MPV 9.6 fL      Platelets 251 10*3/mm3      Neutrophil % 81.4 %      Lymphocyte % 12.5 %      Monocyte % 4.4 %      Eosinophil % 0.4 %      Basophil % 0.6 %      Immature Grans % 0.7 %      Neutrophils, Absolute 8.22 10*3/mm3      Lymphocytes, Absolute 1.26 10*3/mm3      Monocytes, Absolute 0.44 10*3/mm3      Eosinophils, Absolute 0.04 10*3/mm3      Basophils, Absolute 0.06 10*3/mm3      Immature Grans, Absolute 0.07 10*3/mm3      nRBC 0.0 /100 WBC                 Assessment  1. 23 y.o. Female  at 15w4d with intractable vomiting.    Plan  1. Continue antiemetics  2. Continue IVF  3. Clear diet, advance as tolerated  4. FHT daily  5. CMP in am        Radha Murguia MD PGY-1    This document has been electronically signed by Radha Murguia MD on 2019 8:00 AM      This document has been electronically signed by Radha Murguia MD on 2019 7:58 AM

## 2019-08-07 NOTE — H&P
[facility]  Obstetric History and Physical    Chief Complaint   Patient presents with   • Vomiting   • Headache   • Eye Problem           Patient is a 23 y.o. female  currently at 15w3d, who presents with plaint of severe nausea and vomiting.  Patient has been having nausea and vomiting throughout the pregnancy and was started on Phenergan.  States that the Phenergan is not working and over the last 2 days her nausea has gotten significantly worse.  She states that today she has vomited 9 times and is unable to keep anything down.  Patient presented to the ER and was noted to have electrolyte abnormalities.  Fetal ultrasound done in the ER was overall reassuring.  Patient with no other complaints at this time.  Has not felt fetal movement.  Denies any bleeding at this time.    Her prenatal care is complicated by severe nausea and vomiting.  Rh- pregnancy     Obstetric History   #: 1, Date: None, Sex: None, Weight: None, GA: None, Delivery: None, Apgar1: None, Apgar5: None, Living: None, Birth Comments: None       The following portions of the patients history were reviewed and updated as appropriate: current medications, allergies, past medical history, past surgical history, past family history, past social history and problem list .       Prenatal Information:  Prenatal Results     Initial Prenatal Labs     Test Value Reference Range Date Time    Hemoglobin 13.7 g/dL 12.0 - 15.9 g/dL 19    Hematocrit 41.0 % 34.0 - 46.6 % 19 192    Platelets 251 10*3/mm3 140 - 450 10*3/mm3 19 192    Rubella IgG Positive   19 1017    Hepatitis B SAg Non-Reactive  Non-Reactive 19 1017    Hepatitis C Ab Non-Reactive  Non-Reactive 19 1017    RPR Non-Reactive  Non-Reactive 19 1017    ABO O   19 0150    Rh Negative   19 0150    Antibody Screen Positive   19 1017    HIV Non-Reactive  Non-Reactive 19 1017    Urine Culture 25,000 CFU/mL Mixed Raven Isolated    06/13/19 1017    Gonorrhea Negative  Negative 06/13/19 1017    Chlamydia Negative  Negative 06/13/19 1017    TSH              2nd and 3rd Trimester     Test Value Reference Range Date Time    Hemoglobin (repeated)        Hematocrit (repeated)        GCT        Antibody Screen (repeated)        GTT Fasting        GTT 1 Hr        GTT 2 Hr        GTT 3 Hr        Group B Strep              Drug Screening     Test Value Reference Range Date Time    Amphetamine Screen        Barbiturate Screen Negative  Negative 06/13/19 1017    Benzodiazepine Screen Negative  Negative 06/13/19 1017    Methadone Screen Negative  Negative 06/13/19 1017    Phencyclidine Screen        Opiates Screen Negative  Negative 06/13/19 1017    THC Screen Positive  Negative 06/13/19 1017    Cocaine Screen Negative  Negative 06/13/19 1017    Propoxyphene Screen        Buprenorphine Screen        Methamphetamine Screen        Oxycodone Screen Negative  Negative 06/13/19 1017    Tricyclic Antidepressants Screen              Other (Risk screening)     Test Value Reference Range Date Time    Varicella IgG        Parvovirus IgG        CMV IgG        Cystic Fibrosis        Hemoglobin electrophoresis        NIPT        MSAFP-4        AFP (for NTD only)                  External Prenatal Results     Pregnancy Outside Results - Transcribed From Office Records - See Scanned Records For Details     Test Value Date Time    Hgb 13.7 g/dL 08/06/19 1926    Hct 41.0 % 08/06/19 1926    ABO O  07/21/19 0150    Rh Negative  07/21/19 0150    Antibody Screen Positive  06/13/19 1017    Glucose Fasting GTT       Glucose Tolerance Test 1 hour       Glucose Tolerance Test 3 hour       Gonorrhea (discrete) Negative  06/13/19 1017    Chlamydia (discrete) Negative  06/13/19 1017    RPR Non-Reactive  06/13/19 1017    VDRL       Syphilis Antibody       Rubella Positive  06/13/19 1017    HBsAg Non-Reactive  06/13/19 1017    Herpes Simplex Virus PCR       Herpes Simplex VIrus  Culture       HIV Non-Reactive  19 1017    Hep C RNA Quant PCR       Hep C Antibody Non-Reactive  19 1017    AFP       Group B Strep       GBS Susceptibility to Clindamycin       GBS Susceptibility to Erythromycin       Fetal Fibronectin       Genetic Testing, Maternal Blood             Drug Screening     Test Value Date Time    Urine Drug Screen       Amphetamine Screen       Barbiturate Screen Negative  19 1017    Benzodiazepine Screen Negative  19 1017    Methadone Screen Negative  19 1017    Phencyclidine Screen       Opiates Screen Negative  19 1017    THC Screen Positive  19 1017    Cocaine Screen       Propoxyphene Screen       Buprenorphine Screen       Methamphetamine Screen       Oxycodone Screen Negative  19 1017    Tricyclic Antidepressants Screen                    Past OB History:     Obstetric History       T0      L0     SAB0   TAB0   Ectopic0   Molar0   Multiple0   Live Births0       # Outcome Date GA Lbr Ayush/2nd Weight Sex Delivery Anes PTL Lv   1 Current                    ALLERGIES:     Allergies   Allergen Reactions   • Aspirin Shortness Of Breath     TIGHT CHEST   • Codeine Shortness Of Breath, Itching and Rash   • Naproxen Shortness Of Breath     Tightness of chest   • Erythromycin Base Rash   • Latex Rash     Rash         Home Medications:     Prior to Admission medications    Medication Sig Start Date End Date Taking? Authorizing Provider   albuterol (PROVENTIL HFA;VENTOLIN HFA) 108 (90 BASE) MCG/ACT inhaler Inhale. As needed   Yes Provider, MD Anushka   Prenatal Vit-Fe Fumarate-FA (PRENATAL VITAMIN) 27-0.8 MG tablet Take 1 tablet by mouth Daily. 19  Yes Alicia Mckeon APRN   promethazine (PHENERGAN) 25 MG tablet Take 0.5 tablets by mouth Every 8 (Eight) Hours As Needed for Nausea or Vomiting. 19  Yes Brianna Jauregui APRN   metroNIDAZOLE (FLAGYL) 500 MG tablet Take 1 tablet by mouth 2 (Two) Times a Day.  7/26/19 8/6/19  Tyshawn Colon DO       Past Medical History: Past Medical History:   Diagnosis Date   • Abnormal Pap smear of cervix    • Acute maxillary sinusitis    • Acute otitis externa    • Acute pharyngitis    • Acute tonsillitis    • Allergic asthma     IgE-mediated allergic asthma     • Allergic rhinitis    • Allergic rhinitis due to pollen    • Anxiety    • Asthma    • Chondromalacia of patella     left knee    • Common cold    • Cough    • Diarrhea    • Disorder of gallbladder     Probable biliary dyskinesia    • Epigastric pain    • Foot pain    • Gastroenteritis    • Generalized abdominal pain    • Headache    • History of colonoscopy 03/27/2013    Colon endoscopy 43559 (1)  -  Internal & external hemorrhoids found.   • History of esophagogastroduodenoscopy (EGD) 03/27/2013    w/ tube 52846 (1)  -  Normal esophagus. Gastritis in stomach. Biopsy taken. Normal duodenum. Biospy taken   • History of marijuana use    • HPV (human papilloma virus) infection    • IBS (irritable bowel syndrome)    • Influenza    • Irregular periods    • Irritable bowel syndrome    • Migraine    • Nausea    • Nausea and vomiting    • Osgood-Schlatter's disease    • Pain in throat    • Patellar tendonitis    • Right upper quadrant pain    • Streptococcal sore throat    • Temporomandibular joint disorder     WISDOM TEETH    • Upper respiratory infection    • Urgency of urination    • Urinary tract infectious disease    • Varicella    • Viral gastroenteritis    • Vulvovaginitis       Past Surgical History Past Surgical History:   Procedure Laterality Date   • CHOLECYSTECTOMY  06/06/2013    Laparoscopic  -  laparoscopic cholecystectomy with intraoperative cholangiogram. Cholecystitis.   • INJECTION OF MEDICATION  01/08/2013    Toradol (1)   -  FLORY Sotomayor   • LAPAROSCOPIC CHOLECYSTECTOMY     • WISDOM TOOTH EXTRACTION        Family History: Family History   Adopted: Yes   Problem Relation Age of Onset   • Cancer Other    •  Diabetes Other    • Heart disease Other    • Hypertension Other    • Stroke Other    • Lung disease Other    • Cholelithiasis Other    • Ulcers Other    • Other Other         Colon problems   • Ovarian cysts Mother    • Bipolar disorder Mother    • Irritable bowel syndrome Mother    • Ovarian cancer Mother    • No Known Problems Sister    • Emphysema Maternal Grandmother    • Heart attack Maternal Grandfather    • No Known Problems Sister    • No Known Problems Sister       Social History:  reports that she has never smoked. She has never used smokeless tobacco.   reports that she drinks alcohol.   reports that she uses drugs. Drug: Marijuana.        Review of Systems                                                                                                                  Neuro no history of brain tumor    HENT no history of ear tumors    Eye no history of retinal tumors    Pulmonary no history of lung tumors    Cardiac no history of cardiac tumors    GI: No history of small bowel tumors    Musculoskeletal: No history of skeletal muscle tumors    Endocrine: No history of adrenal tumors    Lymphatic: No history of Hodgkin's disease    Renal: No history of renal cancer      Objective       Vital Signs Range for the last 24 hours  Temperature: Temp:  [98 °F (36.7 °C)-98.1 °F (36.7 °C)] 98 °F (36.7 °C)   Temp Source: Temp src: Oral   BP: BP: (104-125)/(56-72) 125/70   Pulse: Heart Rate:  [80-99] 99   Respirations: Resp:  [16-20] 18   SPO2: SpO2:  [97 %-98 %] 98 %   O2 Amount (l/min):     O2 Devices Device (Oxygen Therapy): room air   Weight: Weight:  [84.8 kg (186 lb 14.4 oz)] 84.8 kg (186 lb 14.4 oz)       OBGyn Exam  Constitutional: Appears to be in no acute distress; Eyes: sclera normal; Endocrine system: thyroid palpate is normal; Pulmonary system: lungs clear; Cardiovascular system: heart regular rate and rhythm; Gastrointestinal system: abdomen soft nontender, active bowel sounds; Urologic system: CVA  negative; Psychiatric: appropriate insight; Neurologic: gait within normal limits    OB ultrasound done in the ER for checkout was normal, final report still pending.    Last Labs  Lab Results   Component Value Date    WBC 10.09 2019    RBC 4.66 2019    HGB 13.7 2019    HCT 41.0 2019    MCV 88.0 2019    MCH 29.4 2019    MCHC 33.4 2019    RDW 12.7 2019    RDWSD 40.6 2019    MPV 9.6 2019     2019        Lab Results   Component Value Date    GLUCOSE 83 2019    BUN 10 2019    CREATININE 0.47 (L) 2019     2019    K 3.0 (L) 2019    CL 99 2019    CO2 21.0 (L) 2019    CALCIUM 9.2 2019    PROTEINTOT 7.9 2019    ALBUMIN 4.30 2019    ALT 11 2019    AST 15 2019    ALKPHOS 59 2019    BILITOT 0.3 2019    EGFRIFNONA >150 2019    GLOB 3.6 2019    AGRATIO 1.2 2019    BCR 21.3 2019    ANIONGAP 17.0 (H) 2019             Assessment/Plan:  1. 23 y.o. [id number].  With severe intractable nausea and vomiting of pregnancy.  No other pregnancy complaints at this time.  Plan to keep patient in the hospital for observation.  Continue IV fluids.  Banana bag each morning.  Followed by lactated Ringer's 125 mL/h for the remainder of the day.  Scheduled Zofran 8 mg every 8 hours and scheduled Phenergan 12.5 mg every 8 hours.  Plan to start patient on scopolamine patch.  Patient will be on a clear liquid diet.  Patient is currently receiving potassium replacement.  When patient is feeling better we will try to transition to oral anti-hyper emetics.  If difficulty with improvement will consider home medication.                 This document has been electronically signed by Tyshawn Colon DO on 2019 11:08 PM

## 2019-08-08 LAB
ALBUMIN SERPL-MCNC: 3.3 G/DL (ref 3.5–5.2)
ALBUMIN/GLOB SERPL: 1.1 G/DL
ALP SERPL-CCNC: 51 U/L (ref 39–117)
ALT SERPL W P-5'-P-CCNC: 8 U/L (ref 1–33)
ANION GAP SERPL CALCULATED.3IONS-SCNC: 11 MMOL/L (ref 5–15)
AST SERPL-CCNC: 13 U/L (ref 1–32)
BILIRUB SERPL-MCNC: 0.2 MG/DL (ref 0.2–1.2)
BUN BLD-MCNC: 7 MG/DL (ref 6–20)
BUN/CREAT SERPL: 14.9 (ref 7–25)
CALCIUM SPEC-SCNC: 8.6 MG/DL (ref 8.6–10.5)
CHLORIDE SERPL-SCNC: 108 MMOL/L (ref 98–107)
CO2 SERPL-SCNC: 21 MMOL/L (ref 22–29)
CREAT BLD-MCNC: 0.47 MG/DL (ref 0.57–1)
GFR SERPL CREATININE-BSD FRML MDRD: >150 ML/MIN/1.73
GLOBULIN UR ELPH-MCNC: 3.1 GM/DL
GLUCOSE BLD-MCNC: 89 MG/DL (ref 65–99)
POTASSIUM BLD-SCNC: 3.7 MMOL/L (ref 3.5–5.2)
PROT SERPL-MCNC: 6.4 G/DL (ref 6–8.5)
SODIUM BLD-SCNC: 140 MMOL/L (ref 136–145)

## 2019-08-08 PROCEDURE — G0378 HOSPITAL OBSERVATION PER HR: HCPCS

## 2019-08-08 PROCEDURE — 99224 PR SBSQ OBSERVATION CARE/DAY 15 MINUTES: CPT | Performed by: OBSTETRICS & GYNECOLOGY

## 2019-08-08 PROCEDURE — 63710000001 PROMETHAZINE PER 25 MG: Performed by: STUDENT IN AN ORGANIZED HEALTH CARE EDUCATION/TRAINING PROGRAM

## 2019-08-08 PROCEDURE — 25010000002 THIAMINE PER 100 MG: Performed by: OBSTETRICS & GYNECOLOGY

## 2019-08-08 PROCEDURE — 80053 COMPREHEN METABOLIC PANEL: CPT | Performed by: OBSTETRICS & GYNECOLOGY

## 2019-08-08 PROCEDURE — 96361 HYDRATE IV INFUSION ADD-ON: CPT

## 2019-08-08 RX ORDER — PROMETHAZINE HYDROCHLORIDE 25 MG/1
25 TABLET ORAL EVERY 8 HOURS
Status: DISCONTINUED | OUTPATIENT
Start: 2019-08-08 | End: 2019-08-09 | Stop reason: HOSPADM

## 2019-08-08 RX ADMIN — PROMETHAZINE HYDROCHLORIDE 25 MG: 25 TABLET ORAL at 07:35

## 2019-08-08 RX ADMIN — ACETAMINOPHEN 650 MG: 325 TABLET, FILM COATED ORAL at 02:24

## 2019-08-08 RX ADMIN — ONDANSETRON HYDROCHLORIDE 8 MG: 4 TABLET, FILM COATED ORAL at 19:35

## 2019-08-08 RX ADMIN — PROMETHAZINE HYDROCHLORIDE 25 MG: 25 TABLET ORAL at 23:37

## 2019-08-08 RX ADMIN — ONDANSETRON HYDROCHLORIDE 8 MG: 4 TABLET, FILM COATED ORAL at 03:39

## 2019-08-08 RX ADMIN — ONDANSETRON HYDROCHLORIDE 8 MG: 4 TABLET, FILM COATED ORAL at 11:05

## 2019-08-08 RX ADMIN — PROMETHAZINE HYDROCHLORIDE 25 MG: 25 TABLET ORAL at 16:19

## 2019-08-08 RX ADMIN — FOLIC ACID 125 ML/HR: 5 INJECTION, SOLUTION INTRAMUSCULAR; INTRAVENOUS; SUBCUTANEOUS at 08:46

## 2019-08-08 RX ADMIN — SODIUM CHLORIDE, POTASSIUM CHLORIDE, SODIUM LACTATE AND CALCIUM CHLORIDE 125 ML/HR: 600; 310; 30; 20 INJECTION, SOLUTION INTRAVENOUS at 16:58

## 2019-08-08 NOTE — PLAN OF CARE
Problem: Patient Care Overview  Goal: Plan of Care Review  Outcome: Ongoing (interventions implemented as appropriate)   08/08/19 9187   Coping/Psychosocial   Plan of Care Reviewed With patient   Plan of Care Review   Progress improving   OTHER   Outcome Summary Vomited 2xs during this shift, voiding well, alternating zofran and phenergan, IVF infusing.      Goal: Individualization and Mutuality  Outcome: Ongoing (interventions implemented as appropriate)    Goal: Discharge Needs Assessment  Outcome: Ongoing (interventions implemented as appropriate)    Goal: Interprofessional Rounds/Family Conf  Outcome: Ongoing (interventions implemented as appropriate)      Problem: Fluid Volume Deficit (Adult)  Goal: Identify Related Risk Factors and Signs and Symptoms  Outcome: Ongoing (interventions implemented as appropriate)    Goal: Optimal Fluid Balance  Outcome: Ongoing (interventions implemented as appropriate)      Problem: Hyperemesis Gravidarum (Adult,Obstetrics,Pediatric)  Goal: Signs and Symptoms of Listed Potential Problems Will be Absent, Minimized or Managed (Hyperemesis Gravidarum)  Outcome: Ongoing (interventions implemented as appropriate)

## 2019-08-08 NOTE — PROGRESS NOTES
[facility]  Millie Quevedo  : 1995  MRN: [id number]  CSN: [id number]    L&D Triage note    Subjective  Pt had two episodes of vomiting around 0200. She said she is tolerating clears. IVF continuing.  at bedside. Pt feels overall better.     Min/max vitals past 24 hours:  Temp  Min: 98 °F (36.7 °C)  Max: 98.3 °F (36.8 °C)   BP  Min: 106/59  Max: 126/57   Pulse  Min: 86  Max: 93   Resp  Min: 18  Max: 18    Lab Results (most recent)     Procedure Component Value Units Date/Time    Comprehensive Metabolic Panel [[id number]]  (Abnormal) Collected:  19    Specimen:  Blood Updated:  19     Glucose 89 mg/dL      BUN 7 mg/dL      Creatinine 0.47 mg/dL      Sodium 140 mmol/L      Potassium 3.7 mmol/L      Chloride 108 mmol/L      CO2 21.0 mmol/L      Calcium 8.6 mg/dL      Total Protein 6.4 g/dL      Albumin 3.30 g/dL      ALT (SGPT) 8 U/L      AST (SGOT) 13 U/L      Alkaline Phosphatase 51 U/L      Total Bilirubin 0.2 mg/dL      eGFR Non African Amer >150 mL/min/1.73      Globulin 3.1 gm/dL      A/G Ratio 1.1 g/dL      BUN/Creatinine Ratio 14.9     Anion Gap 11.0 mmol/L     Narrative:       GFR Normal >60  Chronic Kidney Disease <60  Kidney Failure <15    Urine Drug Screen - [[id number]]  (Abnormal) Collected:  19    Specimen:  Urine Updated:  19     THC, Screen, Urine Positive     Phencyclidine (PCP), Urine Negative     Cocaine Screen, Urine Negative     Methamphetamine, Ur Negative     Opiate Screen Negative     Amphetamine Screen, Urine Negative     Benzodiazepine Screen, Urine Negative     Tricyclic Antidepressants Screen Negative     Methadone Screen, Urine Negative     Barbiturates Screen, Urine Negative     Oxycodone Screen, Urine Negative     Propoxyphene Screen Negative     Buprenorphine, Screen, Urine Negative    Narrative:       Cutoff For Drugs Screened:    Amphetamines               500 ng/ml  Barbiturates               200  ng/ml  Benzodiazepines            150 ng/ml  Cocaine                    150 ng/ml  Methadone                  200 ng/ml  Opiates                    100 ng/ml  Phencyclidine               25 ng/ml  THC                            50 ng/ml  Methamphetamine            500 ng/ml  Tricyclic Antidepressants  300 ng/ml  Oxycodone                  100 ng/ml  Propoxyphene               300 ng/ml  Buprenorphine               10 ng/ml    The normal value for all drugs tested is negative. This report includes unconfirmed screening results, with the cutoff values listed, to be used for medical treatment purposes only.  Unconfirmed results must not be used for non-medical purposes such as employment or legal testing.  Clinical consideration should be applied to any drug of abuse test, particularly when unconfirmed results are used.      Cannabinoid Confirmation, Ur - Urine, Clean Catch [[id number]] Collected:  08/07/19 0826    Specimen:  Urine, Clean Catch Updated:  08/07/19 0855    CBC (No Diff) [[id number]]  (Normal) Collected:  08/07/19 0557    Specimen:  Blood Updated:  08/07/19 0620     WBC 8.72 10*3/mm3      RBC 4.23 10*6/mm3      Hemoglobin 12.9 g/dL      Hematocrit 37.5 %      MCV 88.7 fL      MCH 30.5 pg      MCHC 34.4 g/dL      RDW 12.9 %      RDW-SD 41.9 fl      MPV 9.8 fL      Platelets 234 10*3/mm3     Urinalysis With Microscopic If Indicated (No Culture) - Urine, Clean Catch [[id number]]  (Abnormal) Collected:  08/06/19 2105    Specimen:  Urine, Clean Catch Updated:  08/06/19 2137     Color, UA Dark Yellow     Appearance, UA Cloudy     pH, UA 6.0     Specific Gravity, UA 1.031     Comment: Result obtained by Refractometer        Glucose, UA Negative     Ketones, UA 80 mg/dL (3+)     Bilirubin, UA Small (1+)     Blood, UA Negative     Protein, UA Trace     Leuk Esterase, UA Small (1+)     Nitrite, UA Negative     Urobilinogen, UA 0.2 E.U./dL    Urinalysis, Microscopic Only - Urine, Clean Catch [[id number]]  (Abnormal)  Collected:  08/06/19 2105    Specimen:  Urine, Clean Catch Updated:  08/06/19 2137     RBC, UA 3-5 /HPF      WBC, UA 6-12 /HPF      Bacteria, UA 1+ /HPF      Squamous Epithelial Cells, UA 6-12 /HPF      Hyaline Casts, UA 7-12 /LPF      Methodology Automated Microscopy    Extra Tubes [[id number]] Collected:  08/06/19 1930    Specimen:  Blood, Venous Line Updated:  08/06/19 2106    Narrative:       The following orders were created for panel order Extra Tubes.  Procedure                               Abnormality         Status                     ---------                               -----------         ------                     Light Blue Top[[id number]]                                   Final result               Red Top[[id number]]                                                                       Please view results for these tests on the individual orders.    Light Blue Top [[id number]] Collected:  08/06/19 1930    Specimen:  Blood Updated:  08/06/19 2106     Extra Tube hold for add-on     Comment: Auto resulted       Extra Tubes [[id number]] Collected:  08/06/19 1931    Specimen:  Blood, Venous Line Updated:  08/06/19 2106    Narrative:       The following orders were created for panel order Extra Tubes.  Procedure                               Abnormality         Status                     ---------                               -----------         ------                     Gold Top - SST[[id number]]                                   Final result                 Please view results for these tests on the individual orders.    Gold Top - SST [[id number]] Collected:  08/06/19 1931    Specimen:  Blood Updated:  08/06/19 2106     Extra Tube Hold for add-ons.     Comment: Auto resulted.       Comprehensive Metabolic Panel [[id number]]  (Abnormal) Collected:  08/06/19 1926    Specimen:  Blood Updated:  08/06/19 1949     Glucose 83 mg/dL      BUN 10 mg/dL      Creatinine 0.47 mg/dL      Sodium 137 mmol/L      Potassium  3.0 mmol/L      Chloride 99 mmol/L      CO2 21.0 mmol/L      Calcium 9.2 mg/dL      Total Protein 7.9 g/dL      Albumin 4.30 g/dL      ALT (SGPT) 11 U/L      AST (SGOT) 15 U/L      Alkaline Phosphatase 59 U/L      Total Bilirubin 0.3 mg/dL      eGFR Non African Amer >150 mL/min/1.73      Globulin 3.6 gm/dL      A/G Ratio 1.2 g/dL      BUN/Creatinine Ratio 21.3     Anion Gap 17.0 mmol/L     Narrative:       GFR Normal >60  Chronic Kidney Disease <60  Kidney Failure <15    Magnesium [[id number]]  (Normal) Collected:  19    Specimen:  Blood Updated:  19     Magnesium 1.9 mg/dL     CBC & Differential [[id number]] Collected:  19    Specimen:  Blood Updated:  19    Narrative:       The following orders were created for panel order CBC & Differential.  Procedure                               Abnormality         Status                     ---------                               -----------         ------                     CBC Auto Differential[[id number]]        Abnormal            Final result                 Please view results for these tests on the individual orders.    CBC Auto Differential [[id number]]  (Abnormal) Collected:  19    Specimen:  Blood Updated:  19     WBC 10.09 10*3/mm3      RBC 4.66 10*6/mm3      Hemoglobin 13.7 g/dL      Hematocrit 41.0 %      MCV 88.0 fL      MCH 29.4 pg      MCHC 33.4 g/dL      RDW 12.7 %      RDW-SD 40.6 fl      MPV 9.6 fL      Platelets 251 10*3/mm3      Neutrophil % 81.4 %      Lymphocyte % 12.5 %      Monocyte % 4.4 %      Eosinophil % 0.4 %      Basophil % 0.6 %      Immature Grans % 0.7 %      Neutrophils, Absolute 8.22 10*3/mm3      Lymphocytes, Absolute 1.26 10*3/mm3      Monocytes, Absolute 0.44 10*3/mm3      Eosinophils, Absolute 0.04 10*3/mm3      Basophils, Absolute 0.06 10*3/mm3      Immature Grans, Absolute 0.07 10*3/mm3      nRBC 0.0 /100 WBC                 Assessment  1. 23 y.o. Female  currently  at 15w4d with hyperemesis gravidarum.    Plan  1. Alternate scheduled zofran and phenergan orally, so patient will receive an antiemetic every 4 hours.  2. Continue IVF  3. Clear diet, advance as tolerated  4. FHT daily  5. Possible d/c this afternoon if no vomiting. Consider zofran pump as outpatient if oral medication fails.         Radha Murguia MD PGY-1    This document has been electronically signed by Radha Murguia MD on August 8, 2019 7:04 AM      This document has been electronically signed by Radha Murguia MD on August 8, 2019 6:45 AM

## 2019-08-08 NOTE — PLAN OF CARE
Problem: Patient Care Overview  Goal: Plan of Care Review  Outcome: Ongoing (interventions implemented as appropriate)   08/08/19 0442   Coping/Psychosocial   Plan of Care Reviewed With patient   Plan of Care Review   Progress improving   OTHER   Outcome Summary vss, no vomiting today, held down chicken noodle soup and crackers, diet advanced to regular, voiding, alternate po phenergan and zofran, iv infusing     Goal: Individualization and Mutuality  Outcome: Ongoing (interventions implemented as appropriate)    Goal: Discharge Needs Assessment  Outcome: Ongoing (interventions implemented as appropriate)    Goal: Interprofessional Rounds/Family Conf  Outcome: Ongoing (interventions implemented as appropriate)      Problem: Fluid Volume Deficit (Adult)  Goal: Identify Related Risk Factors and Signs and Symptoms  Outcome: Ongoing (interventions implemented as appropriate)    Goal: Optimal Fluid Balance  Outcome: Ongoing (interventions implemented as appropriate)      Problem: Hyperemesis Gravidarum (Adult,Obstetrics,Pediatric)  Goal: Signs and Symptoms of Listed Potential Problems Will be Absent, Minimized or Managed (Hyperemesis Gravidarum)  Outcome: Ongoing (interventions implemented as appropriate)

## 2019-08-09 VITALS
RESPIRATION RATE: 18 BRPM | WEIGHT: 186.9 LBS | SYSTOLIC BLOOD PRESSURE: 108 MMHG | OXYGEN SATURATION: 98 % | TEMPERATURE: 98.1 F | DIASTOLIC BLOOD PRESSURE: 57 MMHG | HEART RATE: 78 BPM | BODY MASS INDEX: 26.17 KG/M2 | HEIGHT: 71 IN

## 2019-08-09 PROCEDURE — 99217 PR OBSERVATION CARE DISCHARGE MANAGEMENT: CPT | Performed by: OBSTETRICS & GYNECOLOGY

## 2019-08-09 PROCEDURE — G0378 HOSPITAL OBSERVATION PER HR: HCPCS

## 2019-08-09 PROCEDURE — 63710000001 PROMETHAZINE PER 25 MG: Performed by: STUDENT IN AN ORGANIZED HEALTH CARE EDUCATION/TRAINING PROGRAM

## 2019-08-09 RX ORDER — PROMETHAZINE HYDROCHLORIDE 25 MG/1
25 TABLET ORAL EVERY 8 HOURS
Qty: 45 TABLET | Refills: 0 | Status: SHIPPED | OUTPATIENT
Start: 2019-08-09 | End: 2019-08-24

## 2019-08-09 RX ORDER — ONDANSETRON HYDROCHLORIDE 8 MG/1
8 TABLET, FILM COATED ORAL 3 TIMES DAILY
Qty: 45 TABLET | Refills: 0 | Status: SHIPPED | OUTPATIENT
Start: 2019-08-09 | End: 2019-08-24

## 2019-08-09 RX ORDER — SCOLOPAMINE TRANSDERMAL SYSTEM 1 MG/1
1 PATCH, EXTENDED RELEASE TRANSDERMAL
Qty: 5 PATCH | Refills: 0 | Status: SHIPPED | OUTPATIENT
Start: 2019-08-10 | End: 2019-08-25

## 2019-08-09 RX ADMIN — PROMETHAZINE HYDROCHLORIDE 25 MG: 25 TABLET ORAL at 08:08

## 2019-08-09 RX ADMIN — ONDANSETRON HYDROCHLORIDE 8 MG: 4 TABLET, FILM COATED ORAL at 03:11

## 2019-08-09 NOTE — PLAN OF CARE
Problem: Patient Care Overview  Goal: Plan of Care Review  Outcome: Ongoing (interventions implemented as appropriate)   08/09/19 0401   Coping/Psychosocial   Plan of Care Reviewed With patient   Plan of Care Review   Progress improving   OTHER   Outcome Summary VSS, some nausea over nigth, no voimiting, eating and drinking well. Alternating phenergan and zofran PO     Goal: Individualization and Mutuality  Outcome: Ongoing (interventions implemented as appropriate)    Goal: Discharge Needs Assessment  Outcome: Ongoing (interventions implemented as appropriate)    Goal: Interprofessional Rounds/Family Conf  Outcome: Ongoing (interventions implemented as appropriate)      Problem: Fluid Volume Deficit (Adult)  Goal: Identify Related Risk Factors and Signs and Symptoms  Outcome: Ongoing (interventions implemented as appropriate)    Goal: Optimal Fluid Balance  Outcome: Ongoing (interventions implemented as appropriate)      Problem: Hyperemesis Gravidarum (Adult,Obstetrics,Pediatric)  Goal: Signs and Symptoms of Listed Potential Problems Will be Absent, Minimized or Managed (Hyperemesis Gravidarum)  Outcome: Ongoing (interventions implemented as appropriate)

## 2019-08-09 NOTE — PROGRESS NOTES
[facility]  Millie Quevedo  : 1995  MRN: [id number]  CSN: [id number]    L&D Triage note    Subjective  Pt resting, states overall feeling better, but still having nausea. She dry heaved twice last night. She feels that the IV antiemetics work better than the orals.  at bedside.     Min/max vitals past 24 hours:  Temp  Min: 97.9 °F (36.6 °C)  Max: 98.4 °F (36.9 °C)   BP  Min: 110/55  Max: 119/60   Pulse  Min: 78  Max: 97   Resp  Min: 18  Max: 20    Lab Results (most recent)     Procedure Component Value Units Date/Time    Comprehensive Metabolic Panel [[id number]]  (Abnormal) Collected:  19    Specimen:  Blood Updated:  19     Glucose 89 mg/dL      BUN 7 mg/dL      Creatinine 0.47 mg/dL      Sodium 140 mmol/L      Potassium 3.7 mmol/L      Chloride 108 mmol/L      CO2 21.0 mmol/L      Calcium 8.6 mg/dL      Total Protein 6.4 g/dL      Albumin 3.30 g/dL      ALT (SGPT) 8 U/L      AST (SGOT) 13 U/L      Alkaline Phosphatase 51 U/L      Total Bilirubin 0.2 mg/dL      eGFR Non African Amer >150 mL/min/1.73      Globulin 3.1 gm/dL      A/G Ratio 1.1 g/dL      BUN/Creatinine Ratio 14.9     Anion Gap 11.0 mmol/L     Narrative:       GFR Normal >60  Chronic Kidney Disease <60  Kidney Failure <15    Urine Drug Screen - [[id number]]  (Abnormal) Collected:  19    Specimen:  Urine Updated:  19     THC, Screen, Urine Positive     Phencyclidine (PCP), Urine Negative     Cocaine Screen, Urine Negative     Methamphetamine, Ur Negative     Opiate Screen Negative     Amphetamine Screen, Urine Negative     Benzodiazepine Screen, Urine Negative     Tricyclic Antidepressants Screen Negative     Methadone Screen, Urine Negative     Barbiturates Screen, Urine Negative     Oxycodone Screen, Urine Negative     Propoxyphene Screen Negative     Buprenorphine, Screen, Urine Negative    Narrative:       Cutoff For Drugs Screened:    Amphetamines               500  ng/ml  Barbiturates               200 ng/ml  Benzodiazepines            150 ng/ml  Cocaine                    150 ng/ml  Methadone                  200 ng/ml  Opiates                    100 ng/ml  Phencyclidine               25 ng/ml  THC                            50 ng/ml  Methamphetamine            500 ng/ml  Tricyclic Antidepressants  300 ng/ml  Oxycodone                  100 ng/ml  Propoxyphene               300 ng/ml  Buprenorphine               10 ng/ml    The normal value for all drugs tested is negative. This report includes unconfirmed screening results, with the cutoff values listed, to be used for medical treatment purposes only.  Unconfirmed results must not be used for non-medical purposes such as employment or legal testing.  Clinical consideration should be applied to any drug of abuse test, particularly when unconfirmed results are used.      Cannabinoid Confirmation, Ur - Urine, Clean Catch [[id number]] Collected:  08/07/19 0826    Specimen:  Urine, Clean Catch Updated:  08/07/19 0855    CBC (No Diff) [[id number]]  (Normal) Collected:  08/07/19 0557    Specimen:  Blood Updated:  08/07/19 0620     WBC 8.72 10*3/mm3      RBC 4.23 10*6/mm3      Hemoglobin 12.9 g/dL      Hematocrit 37.5 %      MCV 88.7 fL      MCH 30.5 pg      MCHC 34.4 g/dL      RDW 12.9 %      RDW-SD 41.9 fl      MPV 9.8 fL      Platelets 234 10*3/mm3     Urinalysis With Microscopic If Indicated (No Culture) - Urine, Clean Catch [[id number]]  (Abnormal) Collected:  08/06/19 2105    Specimen:  Urine, Clean Catch Updated:  08/06/19 2137     Color, UA Dark Yellow     Appearance, UA Cloudy     pH, UA 6.0     Specific Gravity, UA 1.031     Comment: Result obtained by Refractometer        Glucose, UA Negative     Ketones, UA 80 mg/dL (3+)     Bilirubin, UA Small (1+)     Blood, UA Negative     Protein, UA Trace     Leuk Esterase, UA Small (1+)     Nitrite, UA Negative     Urobilinogen, UA 0.2 E.U./dL    Urinalysis, Microscopic Only - Urine,  Clean Catch [[id number]]  (Abnormal) Collected:  08/06/19 2105    Specimen:  Urine, Clean Catch Updated:  08/06/19 2137     RBC, UA 3-5 /HPF      WBC, UA 6-12 /HPF      Bacteria, UA 1+ /HPF      Squamous Epithelial Cells, UA 6-12 /HPF      Hyaline Casts, UA 7-12 /LPF      Methodology Automated Microscopy    Extra Tubes [[id number]] Collected:  08/06/19 1930    Specimen:  Blood, Venous Line Updated:  08/06/19 2106    Narrative:       The following orders were created for panel order Extra Tubes.  Procedure                               Abnormality         Status                     ---------                               -----------         ------                     Light Blue Top[[id number]]                                   Final result               Red Top[[id number]]                                                                       Please view results for these tests on the individual orders.    Light Blue Top [[id number]] Collected:  08/06/19 1930    Specimen:  Blood Updated:  08/06/19 2106     Extra Tube hold for add-on     Comment: Auto resulted       Extra Tubes [[id number]] Collected:  08/06/19 1931    Specimen:  Blood, Venous Line Updated:  08/06/19 2106    Narrative:       The following orders were created for panel order Extra Tubes.  Procedure                               Abnormality         Status                     ---------                               -----------         ------                     Gold Top - SST[[id number]]                                   Final result                 Please view results for these tests on the individual orders.    Gold Top - SST [[id number]] Collected:  08/06/19 1931    Specimen:  Blood Updated:  08/06/19 2106     Extra Tube Hold for add-ons.     Comment: Auto resulted.       Comprehensive Metabolic Panel [[id number]]  (Abnormal) Collected:  08/06/19 1926    Specimen:  Blood Updated:  08/06/19 1949     Glucose 83 mg/dL      BUN 10 mg/dL      Creatinine 0.47 mg/dL       Sodium 137 mmol/L      Potassium 3.0 mmol/L      Chloride 99 mmol/L      CO2 21.0 mmol/L      Calcium 9.2 mg/dL      Total Protein 7.9 g/dL      Albumin 4.30 g/dL      ALT (SGPT) 11 U/L      AST (SGOT) 15 U/L      Alkaline Phosphatase 59 U/L      Total Bilirubin 0.3 mg/dL      eGFR Non African Amer >150 mL/min/1.73      Globulin 3.6 gm/dL      A/G Ratio 1.2 g/dL      BUN/Creatinine Ratio 21.3     Anion Gap 17.0 mmol/L     Narrative:       GFR Normal >60  Chronic Kidney Disease <60  Kidney Failure <15    Magnesium [[id number]]  (Normal) Collected:  08/06/19 1926    Specimen:  Blood Updated:  08/06/19 1949     Magnesium 1.9 mg/dL     CBC & Differential [[id number]] Collected:  08/06/19 1926    Specimen:  Blood Updated:  08/06/19 1934    Narrative:       The following orders were created for panel order CBC & Differential.  Procedure                               Abnormality         Status                     ---------                               -----------         ------                     CBC Auto Differential[[id number]]        Abnormal            Final result                 Please view results for these tests on the individual orders.    CBC Auto Differential [[id number]]  (Abnormal) Collected:  08/06/19 1926    Specimen:  Blood Updated:  08/06/19 1934     WBC 10.09 10*3/mm3      RBC 4.66 10*6/mm3      Hemoglobin 13.7 g/dL      Hematocrit 41.0 %      MCV 88.0 fL      MCH 29.4 pg      MCHC 33.4 g/dL      RDW 12.7 %      RDW-SD 40.6 fl      MPV 9.6 fL      Platelets 251 10*3/mm3      Neutrophil % 81.4 %      Lymphocyte % 12.5 %      Monocyte % 4.4 %      Eosinophil % 0.4 %      Basophil % 0.6 %      Immature Grans % 0.7 %      Neutrophils, Absolute 8.22 10*3/mm3      Lymphocytes, Absolute 1.26 10*3/mm3      Monocytes, Absolute 0.44 10*3/mm3      Eosinophils, Absolute 0.04 10*3/mm3      Basophils, Absolute 0.06 10*3/mm3      Immature Grans, Absolute 0.07 10*3/mm3      nRBC 0.0 /100 WBC                  Assessment  1. 23 y.o. Female  currently at 15w5d with hyperemesis gravidarum.          Plan  1. Alternate scheduled zofran and phenergan orally, so patient will receive an antiemetic every 4 hours.  2. Continue IVF  3. Clear diet, advance as tolerated  4. FHT daily  5. Possible d/c this afternoon if no vomiting. Consider zofran pump as outpatient if oral medication fails.               Radha Murguia MD PGY-1    This document has been electronically signed by Radha Murguia MD on 2019 6:20 AM      This document has been electronically signed by Radha Murguia MD on 2019 6:17 AM

## 2019-08-09 NOTE — DISCHARGE SUMMARY
[facility]  Millie Quevedo  : 1995  MRN: [id number]  CSN: [id number]    Discharge Summary      Date of Admission: 2019   Date of Discharge: 2019   Discharge Diagnosis: 1. Hyperemesis gravidarum   Procedures Performed: None      Hospital Course: Millie Quevedo presented to the ED with severe nausea and vomiting. She is currently 15w3d, and a . She was started on IVF and antiemetics. Adjustments to the medications made over her stay with improvement in symptoms and she was able to tolerate regular diet with oral antiemetics. She was discharged on oral antiemetics.    Pending Studies:  Labs: none  Lab Results (last 72 hours)     Procedure Component Value Units Date/Time    Comprehensive Metabolic Panel [[id number]]  (Abnormal) Collected:  19    Specimen:  Blood Updated:  19     Glucose 89 mg/dL      BUN 7 mg/dL      Creatinine 0.47 mg/dL      Sodium 140 mmol/L      Potassium 3.7 mmol/L      Chloride 108 mmol/L      CO2 21.0 mmol/L      Calcium 8.6 mg/dL      Total Protein 6.4 g/dL      Albumin 3.30 g/dL      ALT (SGPT) 8 U/L      AST (SGOT) 13 U/L      Alkaline Phosphatase 51 U/L      Total Bilirubin 0.2 mg/dL      eGFR Non African Amer >150 mL/min/1.73      Globulin 3.1 gm/dL      A/G Ratio 1.1 g/dL      BUN/Creatinine Ratio 14.9     Anion Gap 11.0 mmol/L     Narrative:       GFR Normal >60  Chronic Kidney Disease <60  Kidney Failure <15    Urine Drug Screen - [[id number]]  (Abnormal) Collected:  19    Specimen:  Urine Updated:  19     THC, Screen, Urine Positive     Phencyclidine (PCP), Urine Negative     Cocaine Screen, Urine Negative     Methamphetamine, Ur Negative     Opiate Screen Negative     Amphetamine Screen, Urine Negative     Benzodiazepine Screen, Urine Negative     Tricyclic Antidepressants Screen Negative     Methadone Screen, Urine Negative     Barbiturates Screen, Urine Negative     Oxycodone Screen, Urine Negative      Propoxyphene Screen Negative     Buprenorphine, Screen, Urine Negative    Narrative:       Cutoff For Drugs Screened:    Amphetamines               500 ng/ml  Barbiturates               200 ng/ml  Benzodiazepines            150 ng/ml  Cocaine                    150 ng/ml  Methadone                  200 ng/ml  Opiates                    100 ng/ml  Phencyclidine               25 ng/ml  THC                            50 ng/ml  Methamphetamine            500 ng/ml  Tricyclic Antidepressants  300 ng/ml  Oxycodone                  100 ng/ml  Propoxyphene               300 ng/ml  Buprenorphine               10 ng/ml    The normal value for all drugs tested is negative. This report includes unconfirmed screening results, with the cutoff values listed, to be used for medical treatment purposes only.  Unconfirmed results must not be used for non-medical purposes such as employment or legal testing.  Clinical consideration should be applied to any drug of abuse test, particularly when unconfirmed results are used.      Cannabinoid Confirmation, Ur - Urine, Clean Catch [[id number]] Collected:  08/07/19 0826    Specimen:  Urine, Clean Catch Updated:  08/07/19 0855    CBC (No Diff) [[id number]]  (Normal) Collected:  08/07/19 0557    Specimen:  Blood Updated:  08/07/19 0620     WBC 8.72 10*3/mm3      RBC 4.23 10*6/mm3      Hemoglobin 12.9 g/dL      Hematocrit 37.5 %      MCV 88.7 fL      MCH 30.5 pg      MCHC 34.4 g/dL      RDW 12.9 %      RDW-SD 41.9 fl      MPV 9.8 fL      Platelets 234 10*3/mm3     Urinalysis With Microscopic If Indicated (No Culture) - Urine, Clean Catch [[id number]]  (Abnormal) Collected:  08/06/19 2105    Specimen:  Urine, Clean Catch Updated:  08/06/19 2137     Color, UA Dark Yellow     Appearance, UA Cloudy     pH, UA 6.0     Specific Gravity, UA 1.031     Comment: Result obtained by Refractometer        Glucose, UA Negative     Ketones, UA 80 mg/dL (3+)     Bilirubin, UA Small (1+)     Blood, UA Negative      Protein, UA Trace     Leuk Esterase, UA Small (1+)     Nitrite, UA Negative     Urobilinogen, UA 0.2 E.U./dL    Urinalysis, Microscopic Only - Urine, Clean Catch [[id number]]  (Abnormal) Collected:  08/06/19 2105    Specimen:  Urine, Clean Catch Updated:  08/06/19 2137     RBC, UA 3-5 /HPF      WBC, UA 6-12 /HPF      Bacteria, UA 1+ /HPF      Squamous Epithelial Cells, UA 6-12 /HPF      Hyaline Casts, UA 7-12 /LPF      Methodology Automated Microscopy    Extra Tubes [[id number]] Collected:  08/06/19 1930    Specimen:  Blood, Venous Line Updated:  08/06/19 2106    Narrative:       The following orders were created for panel order Extra Tubes.  Procedure                               Abnormality         Status                     ---------                               -----------         ------                     Light Blue Top[[id number]]                                   Final result               Red Top[[id number]]                                                                       Please view results for these tests on the individual orders.    Light Blue Top [[id number]] Collected:  08/06/19 1930    Specimen:  Blood Updated:  08/06/19 2106     Extra Tube hold for add-on     Comment: Auto resulted       Extra Tubes [[id number]] Collected:  08/06/19 1931    Specimen:  Blood, Venous Line Updated:  08/06/19 2106    Narrative:       The following orders were created for panel order Extra Tubes.  Procedure                               Abnormality         Status                     ---------                               -----------         ------                     Gold Top - SST[[id number]]                                   Final result                 Please view results for these tests on the individual orders.    Gold Top - SST [[id number]] Collected:  08/06/19 1931    Specimen:  Blood Updated:  08/06/19 2106     Extra Tube Hold for add-ons.     Comment: Auto resulted.       Comprehensive Metabolic Panel [[id number]]   (Abnormal) Collected:  08/06/19 1926    Specimen:  Blood Updated:  08/06/19 1949     Glucose 83 mg/dL      BUN 10 mg/dL      Creatinine 0.47 mg/dL      Sodium 137 mmol/L      Potassium 3.0 mmol/L      Chloride 99 mmol/L      CO2 21.0 mmol/L      Calcium 9.2 mg/dL      Total Protein 7.9 g/dL      Albumin 4.30 g/dL      ALT (SGPT) 11 U/L      AST (SGOT) 15 U/L      Alkaline Phosphatase 59 U/L      Total Bilirubin 0.3 mg/dL      eGFR Non African Amer >150 mL/min/1.73      Globulin 3.6 gm/dL      A/G Ratio 1.2 g/dL      BUN/Creatinine Ratio 21.3     Anion Gap 17.0 mmol/L     Narrative:       GFR Normal >60  Chronic Kidney Disease <60  Kidney Failure <15    Magnesium [[id number]]  (Normal) Collected:  08/06/19 1926    Specimen:  Blood Updated:  08/06/19 1949     Magnesium 1.9 mg/dL     CBC & Differential [[id number]] Collected:  08/06/19 1926    Specimen:  Blood Updated:  08/06/19 1934    Narrative:       The following orders were created for panel order CBC & Differential.  Procedure                               Abnormality         Status                     ---------                               -----------         ------                     CBC Auto Differential[[id number]]        Abnormal            Final result                 Please view results for these tests on the individual orders.    CBC Auto Differential [[id number]]  (Abnormal) Collected:  08/06/19 1926    Specimen:  Blood Updated:  08/06/19 1934     WBC 10.09 10*3/mm3      RBC 4.66 10*6/mm3      Hemoglobin 13.7 g/dL      Hematocrit 41.0 %      MCV 88.0 fL      MCH 29.4 pg      MCHC 33.4 g/dL      RDW 12.7 %      RDW-SD 40.6 fl      MPV 9.6 fL      Platelets 251 10*3/mm3      Neutrophil % 81.4 %      Lymphocyte % 12.5 %      Monocyte % 4.4 %      Eosinophil % 0.4 %      Basophil % 0.6 %      Immature Grans % 0.7 %      Neutrophils, Absolute 8.22 10*3/mm3      Lymphocytes, Absolute 1.26 10*3/mm3      Monocytes, Absolute 0.44 10*3/mm3      Eosinophils,  Absolute 0.04 10*3/mm3      Basophils, Absolute 0.06 10*3/mm3      Immature Grans, Absolute 0.07 10*3/mm3      nRBC 0.0 /100 WBC          Condition at Discharge: Stable   Discharge Diet: Diet Instructions     Diet: Regular      Discharge Diet:  Regular         Discharge Activity: Activity Instructions     Pelvic Rest           Discharge Medications:    Your medication list      START taking these medications      Instructions Last Dose Given Next Dose Due   ondansetron 8 MG tablet  Commonly known as:  ZOFRAN      Take 1 tablet by mouth 3 (Three) Times a Day for 15 days.       Scopolamine 1.5 MG/3DAYS patch  Commonly known as:  TRANSDERM-SCOP  Start taking on:  8/10/2019      Place 1 patch on the skin as directed by provider Every 72 (Seventy-Two) Hours for 15 days.          CHANGE how you take these medications      Instructions Last Dose Given Next Dose Due   promethazine 25 MG tablet  Commonly known as:  PHENERGAN  What changed:  Another medication with the same name was added. Make sure you understand how and when to take each.      Take 0.5 tablets by mouth Every 8 (Eight) Hours As Needed for Nausea or Vomiting.       promethazine 25 MG tablet  Commonly known as:  PHENERGAN  What changed:  You were already taking a medication with the same name, and this prescription was added. Make sure you understand how and when to take each.      Take 1 tablet by mouth Every 8 (Eight) Hours for 15 days.          CONTINUE taking these medications      Instructions Last Dose Given Next Dose Due   albuterol sulfate  (90 Base) MCG/ACT inhaler  Commonly known as:  PROVENTIL HFA;VENTOLIN HFA;PROAIR HFA      Inhale. As needed       Prenatal Vitamin 27-0.8 MG tablet      Take 1 tablet by mouth Daily.             Where to Get Your Medications      You can get these medications from any pharmacy    Bring a paper prescription for each of these medications  · ondansetron 8 MG tablet  · promethazine 25 MG tablet  · Scopolamine 1.5  MG/3DAYS patch        Discharge Disposition: home   Follow-up: Future Appointments   Date Time Provider Department Center   8/13/2019 10:00 AM Alicia Mckeon, APRN [facility] None        Radha Murguia MD PGY-1    This document has been electronically signed by Radha Murguia MD on August 9, 2019 9:13 AM        This note has been electronically signed.    Radha Murguia MD  August 9, 2019  9:06 AM

## 2019-08-11 LAB — CANNABINOIDS UR QL CFM: POSITIVE

## 2019-08-15 ENCOUNTER — ROUTINE PRENATAL (OUTPATIENT)
Dept: OBSTETRICS AND GYNECOLOGY | Facility: CLINIC | Age: 24
End: 2019-08-15

## 2019-08-15 VITALS — WEIGHT: 184 LBS | SYSTOLIC BLOOD PRESSURE: 100 MMHG | BODY MASS INDEX: 25.66 KG/M2 | DIASTOLIC BLOOD PRESSURE: 51 MMHG

## 2019-08-15 DIAGNOSIS — Z67.91 RH NEGATIVE STATUS DURING PREGNANCY IN SECOND TRIMESTER: ICD-10-CM

## 2019-08-15 DIAGNOSIS — O21.0 MODERATE HYPEREMESIS GRAVIDARUM: Primary | ICD-10-CM

## 2019-08-15 DIAGNOSIS — Z3A.16 16 WEEKS GESTATION OF PREGNANCY: ICD-10-CM

## 2019-08-15 DIAGNOSIS — O26.892 RH NEGATIVE STATUS DURING PREGNANCY IN SECOND TRIMESTER: ICD-10-CM

## 2019-08-15 DIAGNOSIS — Z36.3 ENCOUNTER FOR ANTENATAL SCREENING FOR MALFORMATION USING ULTRASOUND: ICD-10-CM

## 2019-08-15 PROCEDURE — 0502F SUBSEQUENT PRENATAL CARE: CPT | Performed by: NURSE PRACTITIONER

## 2019-08-15 NOTE — PROGRESS NOTES
CC: LUCY visit; hx reviewed, no changes    Patient Active Problem List   Diagnosis   • Rh negative status during pregnancy in second trimester   • Intractable vomiting with nausea   • Moderate hyperemesis gravidarum       HPI: 23 y.o.   GA: 16w5d DELFINO: 2020, by Last Menstrual Period  Here for follow up prenatal care. Recently hospitalized for excessive vomiting and dehydration. States that she felt much better while getting IV fluids and zofran IV but since she's been back home she's not been able to eat or drink much and is vomiting at least 5 times per day, usually much more than that. Denies vb, LOF, abnormal vaginal discharge, headaches or heartburn. Has not been taking her zofran or Scopolamine patches b/c she hasn't been able to afford the copays but gets paid tomorrow and is planning to get them then.    Labs: + ketones, +THC  No results found for: HGBA1C  Glucose   Date Value Ref Range Status   2019 89 65 - 99 mg/dL Final   2019 83 65 - 99 mg/dL Final   2019 105 (H) 65 - 99 mg/dL Final     Last Completed Pap Smear       Status Date      PAP SMEAR Done 2019 LIQUID-BASED PAP SMEAR, SCREENING     Patient has more history with this topic...          Radiology: Anatomy u/s    Each of the above were reviewed and integrated into prenatal care plan.    P/E:  See Vitals flowsheet    A/P:    1. Routine prenatal care   Encourage PNV   PTL precautions   Genetic screening WNL; having a girl     2.    Diagnosis Plan   1. Moderate hyperemesis gravidarum  [facility] order placed for Subcutaneous Zofran pump   2. 16 weeks gestation of pregnancy  US Ob 14 + Weeks Single or First Gestation   3. Encounter for  screening for malformation using ultrasound  US Ob 14 + Weeks Single or First Gestation   4. Rh negative status during pregnancy in second trimester  Rhogam at 28 weeks

## 2019-08-19 ENCOUNTER — HOSPITAL ENCOUNTER (OUTPATIENT)
Facility: HOSPITAL | Age: 24
Setting detail: OBSERVATION
LOS: 1 days | Discharge: HOME | End: 2019-08-20
Attending: EMERGENCY MEDICINE | Admitting: OBSTETRICS & GYNECOLOGY

## 2019-08-19 ENCOUNTER — APPOINTMENT (OUTPATIENT)
Dept: ULTRASOUND IMAGING | Facility: HOSPITAL | Age: 24
End: 2019-08-19

## 2019-08-19 DIAGNOSIS — E86.0 DEHYDRATION: ICD-10-CM

## 2019-08-19 DIAGNOSIS — O21.0 HYPEREMESIS GRAVIDARUM: Primary | ICD-10-CM

## 2019-08-19 LAB
ALBUMIN SERPL-MCNC: 4 G/DL (ref 3.5–5.2)
ALBUMIN/GLOB SERPL: 1.1 G/DL
ALP SERPL-CCNC: 60 U/L (ref 39–117)
ALT SERPL W P-5'-P-CCNC: 7 U/L (ref 1–33)
ANION GAP SERPL CALCULATED.3IONS-SCNC: 15 MMOL/L (ref 5–15)
AST SERPL-CCNC: 12 U/L (ref 1–32)
BASOPHILS # BLD AUTO: 0.05 10*3/MM3 (ref 0–0.2)
BASOPHILS NFR BLD AUTO: 0.4 % (ref 0–1.5)
BILIRUB SERPL-MCNC: 0.2 MG/DL (ref 0.2–1.2)
BILIRUB UR QL STRIP: NEGATIVE
BUN BLD-MCNC: 11 MG/DL (ref 6–20)
BUN/CREAT SERPL: 24.4 (ref 7–25)
CALCIUM SPEC-SCNC: 9.2 MG/DL (ref 8.6–10.5)
CHLORIDE SERPL-SCNC: 103 MMOL/L (ref 98–107)
CLARITY UR: CLEAR
CO2 SERPL-SCNC: 20 MMOL/L (ref 22–29)
COLOR UR: YELLOW
CREAT BLD-MCNC: 0.45 MG/DL (ref 0.57–1)
DEPRECATED RDW RBC AUTO: 38.9 FL (ref 37–54)
EOSINOPHIL # BLD AUTO: 0.05 10*3/MM3 (ref 0–0.4)
EOSINOPHIL NFR BLD AUTO: 0.4 % (ref 0.3–6.2)
ERYTHROCYTE [DISTWIDTH] IN BLOOD BY AUTOMATED COUNT: 12.3 % (ref 12.3–15.4)
GFR SERPL CREATININE-BSD FRML MDRD: >150 ML/MIN/1.73
GLOBULIN UR ELPH-MCNC: 3.6 GM/DL
GLUCOSE BLD-MCNC: 91 MG/DL (ref 65–99)
GLUCOSE UR STRIP-MCNC: ABNORMAL MG/DL
HCT VFR BLD AUTO: 36.9 % (ref 34–46.6)
HGB BLD-MCNC: 12.5 G/DL (ref 12–15.9)
HGB UR QL STRIP.AUTO: NEGATIVE
IMM GRANULOCYTES # BLD AUTO: 0.07 10*3/MM3 (ref 0–0.05)
IMM GRANULOCYTES NFR BLD AUTO: 0.6 % (ref 0–0.5)
KETONES UR QL STRIP: NEGATIVE
LEUKOCYTE ESTERASE UR QL STRIP.AUTO: NEGATIVE
LIPASE SERPL-CCNC: 14 U/L (ref 13–60)
LYMPHOCYTES # BLD AUTO: 1.9 10*3/MM3 (ref 0.7–3.1)
LYMPHOCYTES NFR BLD AUTO: 15.2 % (ref 19.6–45.3)
MCH RBC QN AUTO: 29.3 PG (ref 26.6–33)
MCHC RBC AUTO-ENTMCNC: 33.9 G/DL (ref 31.5–35.7)
MCV RBC AUTO: 86.6 FL (ref 79–97)
MONOCYTES # BLD AUTO: 0.61 10*3/MM3 (ref 0.1–0.9)
MONOCYTES NFR BLD AUTO: 4.9 % (ref 5–12)
NEUTROPHILS # BLD AUTO: 9.84 10*3/MM3 (ref 1.7–7)
NEUTROPHILS NFR BLD AUTO: 78.5 % (ref 42.7–76)
NITRITE UR QL STRIP: NEGATIVE
NRBC BLD AUTO-RTO: 0 /100 WBC (ref 0–0.2)
PH UR STRIP.AUTO: 6 [PH] (ref 5–9)
PLATELET # BLD AUTO: 246 10*3/MM3 (ref 140–450)
PMV BLD AUTO: 9.8 FL (ref 6–12)
POTASSIUM BLD-SCNC: 3.7 MMOL/L (ref 3.5–5.2)
PROT SERPL-MCNC: 7.6 G/DL (ref 6–8.5)
PROT UR QL STRIP: NEGATIVE
RBC # BLD AUTO: 4.26 10*6/MM3 (ref 3.77–5.28)
SODIUM BLD-SCNC: 138 MMOL/L (ref 136–145)
SP GR UR STRIP: 1.03 (ref 1–1.03)
UROBILINOGEN UR QL STRIP: ABNORMAL
WBC NRBC COR # BLD: 12.52 10*3/MM3 (ref 3.4–10.8)

## 2019-08-19 PROCEDURE — 25010000002 MORPHINE PER 10 MG: Performed by: EMERGENCY MEDICINE

## 2019-08-19 PROCEDURE — 85025 COMPLETE CBC W/AUTO DIFF WBC: CPT | Performed by: EMERGENCY MEDICINE

## 2019-08-19 PROCEDURE — 25010000002 ONDANSETRON PER 1 MG: Performed by: STUDENT IN AN ORGANIZED HEALTH CARE EDUCATION/TRAINING PROGRAM

## 2019-08-19 PROCEDURE — 76815 OB US LIMITED FETUS(S): CPT

## 2019-08-19 PROCEDURE — G0378 HOSPITAL OBSERVATION PER HR: HCPCS

## 2019-08-19 PROCEDURE — 81003 URINALYSIS AUTO W/O SCOPE: CPT | Performed by: EMERGENCY MEDICINE

## 2019-08-19 PROCEDURE — 83690 ASSAY OF LIPASE: CPT | Performed by: EMERGENCY MEDICINE

## 2019-08-19 PROCEDURE — 25010000002 PROMETHAZINE PER 50 MG: Performed by: EMERGENCY MEDICINE

## 2019-08-19 PROCEDURE — 96375 TX/PRO/DX INJ NEW DRUG ADDON: CPT

## 2019-08-19 PROCEDURE — 96365 THER/PROPH/DIAG IV INF INIT: CPT

## 2019-08-19 PROCEDURE — 96361 HYDRATE IV INFUSION ADD-ON: CPT

## 2019-08-19 PROCEDURE — 96366 THER/PROPH/DIAG IV INF ADDON: CPT

## 2019-08-19 PROCEDURE — 80053 COMPREHEN METABOLIC PANEL: CPT | Performed by: EMERGENCY MEDICINE

## 2019-08-19 PROCEDURE — 99284 EMERGENCY DEPT VISIT MOD MDM: CPT

## 2019-08-19 PROCEDURE — 25010000002 PROMETHAZINE PER 50 MG: Performed by: OBSTETRICS & GYNECOLOGY

## 2019-08-19 PROCEDURE — 96376 TX/PRO/DX INJ SAME DRUG ADON: CPT

## 2019-08-19 RX ORDER — SODIUM CHLORIDE 0.9 % (FLUSH) 0.9 %
3-10 SYRINGE (ML) INJECTION AS NEEDED
Status: DISCONTINUED | OUTPATIENT
Start: 2019-08-19 | End: 2019-08-20 | Stop reason: HOSPADM

## 2019-08-19 RX ORDER — PROMETHAZINE HYDROCHLORIDE 25 MG/ML
12.5 INJECTION, SOLUTION INTRAMUSCULAR; INTRAVENOUS EVERY 6 HOURS PRN
Status: DISCONTINUED | OUTPATIENT
Start: 2019-08-19 | End: 2019-08-20 | Stop reason: HOSPADM

## 2019-08-19 RX ORDER — SODIUM CHLORIDE 0.9 % (FLUSH) 0.9 %
3 SYRINGE (ML) INJECTION EVERY 12 HOURS SCHEDULED
Status: DISCONTINUED | OUTPATIENT
Start: 2019-08-19 | End: 2019-08-20 | Stop reason: HOSPADM

## 2019-08-19 RX ORDER — METOCLOPRAMIDE 10 MG/1
10 TABLET ORAL EVERY 8 HOURS
Status: DISCONTINUED | OUTPATIENT
Start: 2019-08-19 | End: 2019-08-20 | Stop reason: HOSPADM

## 2019-08-19 RX ORDER — PROMETHAZINE HYDROCHLORIDE 25 MG/ML
12.5 INJECTION, SOLUTION INTRAMUSCULAR; INTRAVENOUS ONCE
Status: COMPLETED | OUTPATIENT
Start: 2019-08-19 | End: 2019-08-19

## 2019-08-19 RX ORDER — PROMETHAZINE HYDROCHLORIDE 12.5 MG/1
12.5 TABLET ORAL EVERY 6 HOURS PRN
Status: DISCONTINUED | OUTPATIENT
Start: 2019-08-19 | End: 2019-08-19 | Stop reason: SDUPTHER

## 2019-08-19 RX ORDER — ALBUTEROL SULFATE 2.5 MG/3ML
2.5 SOLUTION RESPIRATORY (INHALATION) EVERY 4 HOURS PRN
Status: DISCONTINUED | OUTPATIENT
Start: 2019-08-19 | End: 2019-08-20 | Stop reason: HOSPADM

## 2019-08-19 RX ORDER — METOCLOPRAMIDE 10 MG/1
10 TABLET ORAL
Status: DISCONTINUED | OUTPATIENT
Start: 2019-08-19 | End: 2019-08-19 | Stop reason: CLARIF

## 2019-08-19 RX ORDER — DEXTROSE, SODIUM CHLORIDE, AND POTASSIUM CHLORIDE 5; .9; .15 G/100ML; G/100ML; G/100ML
125 INJECTION INTRAVENOUS CONTINUOUS
Status: DISCONTINUED | OUTPATIENT
Start: 2019-08-19 | End: 2019-08-20 | Stop reason: HOSPADM

## 2019-08-19 RX ORDER — ONDANSETRON 2 MG/ML
20 INJECTION INTRAMUSCULAR; INTRAVENOUS
Status: DISCONTINUED | OUTPATIENT
Start: 2019-08-19 | End: 2019-08-20 | Stop reason: HOSPADM

## 2019-08-19 RX ORDER — SCOLOPAMINE TRANSDERMAL SYSTEM 1 MG/1
1 PATCH, EXTENDED RELEASE TRANSDERMAL
Status: DISCONTINUED | OUTPATIENT
Start: 2019-08-19 | End: 2019-08-20 | Stop reason: HOSPADM

## 2019-08-19 RX ORDER — ACETAMINOPHEN 325 MG/1
650 TABLET ORAL EVERY 6 HOURS PRN
Status: DISCONTINUED | OUTPATIENT
Start: 2019-08-19 | End: 2019-08-20 | Stop reason: HOSPADM

## 2019-08-19 RX ORDER — ONDANSETRON 2 MG/ML
4 INJECTION INTRAMUSCULAR; INTRAVENOUS EVERY 4 HOURS
Status: DISCONTINUED | OUTPATIENT
Start: 2019-08-19 | End: 2019-08-19 | Stop reason: SDUPTHER

## 2019-08-19 RX ADMIN — ACETAMINOPHEN 650 MG: 325 TABLET, FILM COATED ORAL at 16:57

## 2019-08-19 RX ADMIN — PROMETHAZINE HYDROCHLORIDE 12.5 MG: 25 INJECTION INTRAMUSCULAR; INTRAVENOUS at 15:51

## 2019-08-19 RX ADMIN — Medication 3 ML: at 10:40

## 2019-08-19 RX ADMIN — ONDANSETRON 20 MG: 2 INJECTION INTRAMUSCULAR; INTRAVENOUS at 23:00

## 2019-08-19 RX ADMIN — PROMETHAZINE HYDROCHLORIDE 12.5 MG: 25 INJECTION INTRAMUSCULAR; INTRAVENOUS at 05:29

## 2019-08-19 RX ADMIN — ACETAMINOPHEN 650 MG: 325 TABLET, FILM COATED ORAL at 21:46

## 2019-08-19 RX ADMIN — SCOPALAMINE 1 PATCH: 1 PATCH, EXTENDED RELEASE TRANSDERMAL at 10:43

## 2019-08-19 RX ADMIN — PROMETHAZINE HYDROCHLORIDE 12.5 MG: 25 INJECTION INTRAMUSCULAR; INTRAVENOUS at 21:50

## 2019-08-19 RX ADMIN — ONDANSETRON 4 MG: 2 INJECTION INTRAMUSCULAR; INTRAVENOUS at 18:05

## 2019-08-19 RX ADMIN — METOCLOPRAMIDE HYDROCHLORIDE 10 MG: 10 TABLET ORAL at 14:26

## 2019-08-19 RX ADMIN — POTASSIUM CHLORIDE, DEXTROSE MONOHYDRATE AND SODIUM CHLORIDE 125 ML/HR: 150; 5; 900 INJECTION, SOLUTION INTRAVENOUS at 19:29

## 2019-08-19 RX ADMIN — POTASSIUM CHLORIDE, DEXTROSE MONOHYDRATE AND SODIUM CHLORIDE 125 ML/HR: 150; 5; 900 INJECTION, SOLUTION INTRAVENOUS at 10:44

## 2019-08-19 RX ADMIN — METOCLOPRAMIDE HYDROCHLORIDE 10 MG: 10 TABLET ORAL at 23:09

## 2019-08-19 RX ADMIN — SODIUM CHLORIDE 1000 ML: 9 INJECTION, SOLUTION INTRAVENOUS at 05:25

## 2019-08-19 RX ADMIN — MORPHINE SULFATE 4 MG: 4 INJECTION INTRAVENOUS at 05:25

## 2019-08-19 NOTE — ED NOTES
Patient presents to Ed with complaint of lower abdominal pain, nausea and vomiting. She has had a zofran  pump placed Saturday due to the chronic nausea. She states she is 17 weeks pregnant. She called her on call nurse and explained to her and she stated to call the on call doctor who then told patient to come to ED.    She states today she has been feeling dizzy, hot, flushed and nauseous.      Lilliam Maharaj RN  08/19/19 [id number]

## 2019-08-19 NOTE — H&P
[facility]  Millie Quevedo  : 1995  MRN: [id number]  CSN: [id number]    History and Physical    Subjective   Millie Quevedo is a 23 y.o. year old  with an Estimated Date of Delivery: 20 who presents to be seen because of  Exacerbation of N/V. Had been admitted earlier in the pregnancy but had not filled scripts until recently due to cost. Started Phenergan on Wed and had a Zofran pump placed on Saturday. Symptoms are usually worse during the night and she reports that symptoms have not been tolerable overnight.  Patient also reports lower abdominal pain with she describes as a constant stabbing pain in her suprapubic area. She notes feeling flushed and dizzy.      The following portions of the patient's history were reviewed and updated as appropriate:problem list, current medications, allergies, past family history, past medical history, past social history and past surgical history.      Obstetric History       T0      L0     SAB0   TAB0   Ectopic0   Molar0   Multiple0   Live Births0       # Outcome Date GA Lbr Ayush/2nd Weight Sex Delivery Anes PTL Lv   1 Current                 Past Medical History:   Diagnosis Date   • Abnormal Pap smear of cervix    • Acute maxillary sinusitis    • Acute otitis externa    • Acute pharyngitis    • Acute tonsillitis    • Allergic asthma     IgE-mediated allergic asthma     • Allergic rhinitis    • Allergic rhinitis due to pollen    • Anxiety    • Asthma    • Chondromalacia of patella     left knee    • Common cold    • Cough    • Diarrhea    • Disorder of gallbladder     Probable biliary dyskinesia    • Epigastric pain    • Foot pain    • Gastroenteritis    • Generalized abdominal pain    • Headache    • History of colonoscopy 2013    Colon endoscopy 14538 (1)  -  Internal & external hemorrhoids found.   • History of esophagogastroduodenoscopy (EGD) 2013    w/ tube 41822 (1)  -  Normal esophagus. Gastritis in  stomach. Biopsy taken. Normal duodenum. Biospy taken   • History of marijuana use    • HPV (human papilloma virus) infection    • IBS (irritable bowel syndrome)    • Influenza    • Irregular periods    • Irritable bowel syndrome    • Migraine    • Nausea    • Nausea and vomiting    • Osgood-Schlatter's disease    • Pain in throat    • Patellar tendonitis    • Right upper quadrant pain    • Streptococcal sore throat    • Temporomandibular joint disorder     WISDOM TEETH    • Upper respiratory infection    • Urgency of urination    • Urinary tract infectious disease    • Varicella    • Viral gastroenteritis    • Vulvovaginitis      Past Surgical History:   Procedure Laterality Date   • CHOLECYSTECTOMY  06/06/2013    Laparoscopic  -  laparoscopic cholecystectomy with intraoperative cholangiogram. Cholecystitis.   • INJECTION OF MEDICATION  01/08/2013    Toradol (1)   -  WOdalis Sotomayor   • LAPAROSCOPIC CHOLECYSTECTOMY     • WISDOM TOOTH EXTRACTION       No current facility-administered medications for this encounter.     Current Outpatient Medications:   •  Prenatal Vit-Fe Fumarate-FA (PRENATAL VITAMIN) 27-0.8 MG tablet, Take 1 tablet by mouth Daily., Disp: 30 tablet, Rfl: 12  •  promethazine (PHENERGAN) 25 MG tablet, Take 1 tablet by mouth Every 8 (Eight) Hours for 15 days. (Patient taking differently: Take 25 mg by mouth Every 4 (Four) Hours.), Disp: 45 tablet, Rfl: 0  •  albuterol (PROVENTIL HFA;VENTOLIN HFA) 108 (90 BASE) MCG/ACT inhaler, Inhale. As needed, Disp: , Rfl:   •  ondansetron (ZOFRAN) 8 MG tablet, Take 1 tablet by mouth 3 (Three) Times a Day for 15 days., Disp: 45 tablet, Rfl: 0  •  promethazine (PHENERGAN) 25 MG tablet, Take 0.5 tablets by mouth Every 8 (Eight) Hours As Needed for Nausea or Vomiting., Disp: 30 tablet, Rfl: 1  •  Scopolamine (TRANSDERM-SCOP) 1.5 MG/3DAYS patch, Place 1 patch on the skin as directed by provider Every 72 (Seventy-Two) Hours for 15 days., Disp: 5 patch, Rfl: 0  Prior to  Admission medications    Medication Sig Start Date End Date Taking? Authorizing Provider   Prenatal Vit-Fe Fumarate-FA (PRENATAL VITAMIN) 27-0.8 MG tablet Take 1 tablet by mouth Daily. 5/17/19  Yes Alicia Mckeon APRN   promethazine (PHENERGAN) 25 MG tablet Take 1 tablet by mouth Every 8 (Eight) Hours for 15 days.  Patient taking differently: Take 25 mg by mouth Every 4 (Four) Hours. 8/9/19 8/24/19 Yes Tyshawn Colon DO   albuterol (PROVENTIL HFA;VENTOLIN HFA) 108 (90 BASE) MCG/ACT inhaler Inhale. As needed    Provider, MD Anushka   ondansetron (ZOFRAN) 8 MG tablet Take 1 tablet by mouth 3 (Three) Times a Day for 15 days. 8/9/19 8/24/19  Tyshawn Colon DO   promethazine (PHENERGAN) 25 MG tablet Take 0.5 tablets by mouth Every 8 (Eight) Hours As Needed for Nausea or Vomiting. 6/13/19   Brianna Jauregui APRN   Scopolamine (TRANSDERM-SCOP) 1.5 MG/3DAYS patch Place 1 patch on the skin as directed by provider Every 72 (Seventy-Two) Hours for 15 days. 8/10/19 8/25/19  Tyshawn Colon DO         Allergies   Allergen Reactions   • Aspirin Shortness Of Breath     TIGHT CHEST   • Codeine Shortness Of Breath, Itching and Rash   • Naproxen Shortness Of Breath     Tightness of chest   • Erythromycin Base Rash   • Latex Rash     Rash      Social History    Tobacco Use      Smoking status: Never Smoker      Smokeless tobacco: Never Used  Former THC    Family History   Adopted: Yes   Problem Relation Age of Onset   • Cancer Other    • Diabetes Other    • Heart disease Other    • Hypertension Other    • Stroke Other    • Lung disease Other    • Cholelithiasis Other    • Ulcers Other    • Other Other         Colon problems   • Ovarian cysts Mother    • Bipolar disorder Mother    • Irritable bowel syndrome Mother    • Ovarian cancer Mother    • No Known Problems Sister    • Emphysema Maternal Grandmother    • Heart attack Maternal Grandfather    • No Known Problems Sister    • No Known  "Problems Sister        Review of Systems   Constitutional: Positive for appetite change.   Gastrointestinal: Positive for abdominal pain.   Genitourinary: Negative for dysuria.   Neurological: Positive for dizziness. Negative for syncope.         Objective   /73   Pulse 84   Temp 99.1 °F (37.3 °C) (Oral)   Resp 18   Ht 180.3 cm (71\")   Wt 82.1 kg (181 lb)   LMP 04/20/2019 (Exact Date)   SpO2 98%   BMI 25.24 kg/m²   General: well developed; well nourished  no acute distress   Thyroid: normal to inspection and palpation   Skin: No suspicious lesions seen   Heart: Not performed.   Lungs: breathing is unlabored   Abdomen: no umbilical or inguinal hernias are present  no hepato-splenomegaly  TTP in suprapubic area with no rebound or guarding   FHT's: confirmed   Back: Not performed today   Extremities: extremities normal, atraumatic, no cyanosis or edema     Prenatal Labs  Lab Results   Component Value Date    HEPBSAG Non-Reactive 06/13/2019    UBN0KAA5 Non-Reactive 06/13/2019    HEPCVIRUSABY Non-Reactive 06/13/2019       Current Labs Reviewed   CMP and UA    Imaging   No data reviewed        Assessment   1. IUP at 17w2d  2. Hyperemesis gravidarum  3. Prenatal care significant for hyperemesis gravidarum.     Plan   1. Admit for hydration and electrolyte replacement.  2. Anti-emetics PRN  3. Reglan q 8  4. Continue Zofran pump so that current regimen can be continued    Duke Sheets MD  8/19/2019  8:18 AM     "

## 2019-08-19 NOTE — ED PROVIDER NOTES
Subjective   23 years old  1 para 0 at 17 weeks gestation with hyperemesis gravidarum currently having Zofran pump with vomiting 4-5 times every day presented in the ER with almost 7 episodes  of nonprojectile, nonbilious vomiting since yesterday.  She has been feeling weak tired and dehydrated.  She is not able to hold much down despite taking Zofran.  She is also complaining of dull cramping pain  of moderate intensity without any significant aggravating relieving factors, not associated with any vaginal bleeding or discharge.  Patient report this pain different than her routine  round ligament pain.  Denies any urinary symptoms.  No fever or chills reported.        History provided by:  Patient      Review of Systems   Constitutional: Positive for fatigue. Negative for chills and fever.   HENT: Negative for congestion, postnasal drip, sinus pressure and sore throat.    Respiratory: Negative for chest tightness and shortness of breath.    Cardiovascular: Negative for chest pain.   Gastrointestinal: Positive for abdominal pain, nausea and vomiting.   Musculoskeletal: Negative for neck pain.   Skin: Negative for color change.   Neurological: Negative for numbness and headaches.   Psychiatric/Behavioral: Negative for agitation.       Past Medical History:   Diagnosis Date   • Abnormal Pap smear of cervix    • Acute maxillary sinusitis    • Acute otitis externa    • Acute pharyngitis    • Acute tonsillitis    • Allergic asthma     IgE-mediated allergic asthma     • Allergic rhinitis    • Allergic rhinitis due to pollen    • Anxiety    • Asthma    • Chondromalacia of patella     left knee    • Common cold    • Cough    • Diarrhea    • Disorder of gallbladder     Probable biliary dyskinesia    • Epigastric pain    • Foot pain    • Gastroenteritis    • Generalized abdominal pain    • Headache    • History of colonoscopy 2013    Colon endoscopy 04785 (1)  -  Internal & external hemorrhoids found.   • History  of esophagogastroduodenoscopy (EGD) 03/27/2013    w/ tube 07588 (1)  -  Normal esophagus. Gastritis in stomach. Biopsy taken. Normal duodenum. Biospy taken   • History of marijuana use    • HPV (human papilloma virus) infection    • IBS (irritable bowel syndrome)    • Influenza    • Irregular periods    • Irritable bowel syndrome    • Migraine    • Nausea    • Nausea and vomiting    • Osgood-Schlatter's disease    • Pain in throat    • Patellar tendonitis    • Right upper quadrant pain    • Streptococcal sore throat    • Temporomandibular joint disorder     WISDOM TEETH    • Upper respiratory infection    • Urgency of urination    • Urinary tract infectious disease    • Varicella    • Viral gastroenteritis    • Vulvovaginitis        Allergies   Allergen Reactions   • Aspirin Shortness Of Breath     TIGHT CHEST   • Codeine Shortness Of Breath, Itching and Rash   • Naproxen Shortness Of Breath     Tightness of chest   • Erythromycin Base Rash   • Latex Rash     Rash        Past Surgical History:   Procedure Laterality Date   • CHOLECYSTECTOMY  06/06/2013    Laparoscopic  -  laparoscopic cholecystectomy with intraoperative cholangiogram. Cholecystitis.   • INJECTION OF MEDICATION  01/08/2013    Toradol (1)   -  FLORY Sotomayor   • LAPAROSCOPIC CHOLECYSTECTOMY     • WISDOM TOOTH EXTRACTION         Family History   Adopted: Yes   Problem Relation Age of Onset   • Cancer Other    • Diabetes Other    • Heart disease Other    • Hypertension Other    • Stroke Other    • Lung disease Other    • Cholelithiasis Other    • Ulcers Other    • Other Other         Colon problems   • Ovarian cysts Mother    • Bipolar disorder Mother    • Irritable bowel syndrome Mother    • Ovarian cancer Mother    • No Known Problems Sister    • Emphysema Maternal Grandmother    • Heart attack Maternal Grandfather    • No Known Problems Sister    • No Known Problems Sister        Social History     Socioeconomic History   • Marital status:       Spouse name: Not on file   • Number of children: Not on file   • Years of education: Not on file   • Highest education level: Not on file   Tobacco Use   • Smoking status: Never Smoker   • Smokeless tobacco: Never Used   Substance and Sexual Activity   • Alcohol use: Yes     Comment: occasional   • Drug use: Yes     Types: Marijuana     Comment: stopped with positive pregnancy test    • Sexual activity: Yes     Partners: Male     Birth control/protection: None     Comment: last pap smear 5/17/19 ASCUS            Objective   Physical Exam   Constitutional: She is oriented to person, place, and time. She appears well-developed and well-nourished.   HENT:   Head: Normocephalic and atraumatic.   Eyes: EOM are normal.   Cardiovascular: Normal rate, regular rhythm and normal heart sounds.   Abdominal: Soft. Normal appearance and bowel sounds are normal. There is tenderness in the suprapubic area. There is no rebound and no CVA tenderness.   Gravid uterus   Neurological: She is alert and oriented to person, place, and time.   Skin: Skin is warm and dry. Capillary refill takes less than 2 seconds.   Psychiatric: She has a normal mood and affect.   Nursing note and vitals reviewed.      Procedures           ED Course                  MDM  Number of Diagnoses or Management Options  Dehydration:   Hyperemesis gravidarum:   Diagnosis management comments: 23 years old presented with hyperemesis gravidarum symptoms not getting under control with Zofran pump.  He has multiple episodes of vomiting while in ER.  Given IV fluids.  Has fetal heart tones at 145's.  Stable work-up otherwise.  Ordered ultrasound, discussed with Dr. Sheets who does not think need to have an ultrasound today.  Patient would be admitted to OB floor for hydration.       Amount and/or Complexity of Data Reviewed  Clinical lab tests: ordered and reviewed      Labs Reviewed   COMPREHENSIVE METABOLIC PANEL - Abnormal; Notable for the following components:        Result Value    Creatinine 0.45 (*)     CO2 20.0 (*)     All other components within normal limits    Narrative:     GFR Normal >60  Chronic Kidney Disease <60  Kidney Failure <15   URINALYSIS W/ CULTURE IF INDICATED - Abnormal; Notable for the following components:    Glucose,  mg/dL (Trace) (*)     All other components within normal limits    Narrative:     Urine microscopic not indicated.   CBC WITH AUTO DIFFERENTIAL - Abnormal; Notable for the following components:    WBC 12.52 (*)     Neutrophil % 78.5 (*)     Lymphocyte % 15.2 (*)     Monocyte % 4.9 (*)     Immature Grans % 0.6 (*)     Neutrophils, Absolute 9.84 (*)     Immature Grans, Absolute 0.07 (*)     All other components within normal limits   COMPREHENSIVE METABOLIC PANEL - Abnormal; Notable for the following components:    BUN 4 (*)     Creatinine 0.50 (*)     Chloride 108 (*)     CO2 21.0 (*)     Calcium 8.5 (*)     Albumin 3.40 (*)     All other components within normal limits    Narrative:     GFR Normal >60  Chronic Kidney Disease <60  Kidney Failure <15   LIPASE - Normal   URINALYSIS W/ CULTURE IF INDICATED - Normal    Narrative:     Urine microscopic not indicated.   CBC AND DIFFERENTIAL    Narrative:     The following orders were created for panel order CBC & Differential.  Procedure                               Abnormality         Status                     ---------                               -----------         ------                     CBC Auto Differential[[id number]]        Abnormal            Final result                 Please view results for these tests on the individual orders.       Us Ob Limited 1 + Fetuses    Result Date: 2019  Narrative: PROCEDURE: US PREGNANCY LIMITED  COMPARISON: None available HISTORY: Routine  screening, anatomy scan TECHNIQUE: Realtime grayscale imaging is performed of the fetus. FINDINGS: There is a single intrauterine gestation. The placenta is predominately anterior. Fetal  presentation is breech. Amniotic fluid index is normal, measuring 11.33 cm Fetal cardiac activity is present with a heart rate of 147 beats per minute.  Biparietal diameter measures 3.92 cm, corresponding to an estimated gestational age of 17 weeks, 6 day(s) Head circumference measures 14.32 cm, corresponding to an estimated gestational age of 17 weeks, four day(s) Maternal ovaries are not identified.     Impression: CONCLUSION:  Single, live, intrauterine pregnancy as described above. No gross abnormality seen. Estimated gestational age is 17 weeks, 5 days. Electronically signed by:  Jax Santiago MD  8/19/2019 8:55 AM CDT Workstation: [id number]          Final diagnoses:   Hyperemesis gravidarum   Dehydration            Idnra Suarez MD  08/22/19 153

## 2019-08-19 NOTE — ED NOTES
Fetal heart tones noted at 146 bpm     Lilliam Maharaj, RN  08/19/19 0624       Lilliam Maharaj, RN  08/19/19 0625

## 2019-08-19 NOTE — PROGRESS NOTES
S: patient reports improvement in nauesea with Scopalamine patch. Not sure if she's been getting Reglan  O: AOx3, NAD  A/P 22 yo G1 at 17+2 with hyperemesis  Continue Scopalamine with script when discharged.  Resume Reglan which was discontinued with transition from ER

## 2019-08-19 NOTE — SIGNIFICANT NOTE
Pt resting. States her zofran pump ran out and was turned off. Zofran IV scheduled dosing ordered. Pt instructed to get her zofran refill ASAP and alert nursing when she has it refilled and back on. Spoke with primary nurse and told her to D/C ordered zofran once her pump is back refilled and on. Pt states she vomited up oral reglan. Will continue to receive future dosing. All questions answered. Dr Sheets updated and agreeable on plan.  at bedside.   Radha Murguia MD PGY-1    This document has been electronically signed by Radha Murguia MD on August 19, 2019 5:09 PM

## 2019-08-20 VITALS
TEMPERATURE: 98.3 F | HEIGHT: 71 IN | BODY MASS INDEX: 25.34 KG/M2 | WEIGHT: 181 LBS | HEART RATE: 88 BPM | SYSTOLIC BLOOD PRESSURE: 113 MMHG | DIASTOLIC BLOOD PRESSURE: 65 MMHG | RESPIRATION RATE: 18 BRPM | OXYGEN SATURATION: 99 %

## 2019-08-20 LAB
ALBUMIN SERPL-MCNC: 3.4 G/DL (ref 3.5–5.2)
ALBUMIN/GLOB SERPL: 1.1 G/DL
ALP SERPL-CCNC: 51 U/L (ref 39–117)
ALT SERPL W P-5'-P-CCNC: 7 U/L (ref 1–33)
ANION GAP SERPL CALCULATED.3IONS-SCNC: 10 MMOL/L (ref 5–15)
AST SERPL-CCNC: 10 U/L (ref 1–32)
BILIRUB SERPL-MCNC: 0.2 MG/DL (ref 0.2–1.2)
BILIRUB UR QL STRIP: NEGATIVE
BUN BLD-MCNC: 4 MG/DL (ref 6–20)
BUN/CREAT SERPL: 8 (ref 7–25)
CALCIUM SPEC-SCNC: 8.5 MG/DL (ref 8.6–10.5)
CHLORIDE SERPL-SCNC: 108 MMOL/L (ref 98–107)
CLARITY UR: CLEAR
CO2 SERPL-SCNC: 21 MMOL/L (ref 22–29)
COLOR UR: YELLOW
CREAT BLD-MCNC: 0.5 MG/DL (ref 0.57–1)
GFR SERPL CREATININE-BSD FRML MDRD: >150 ML/MIN/1.73
GLOBULIN UR ELPH-MCNC: 3.1 GM/DL
GLUCOSE BLD-MCNC: 92 MG/DL (ref 65–99)
GLUCOSE UR STRIP-MCNC: NEGATIVE MG/DL
HGB UR QL STRIP.AUTO: NEGATIVE
KETONES UR QL STRIP: NEGATIVE
LEUKOCYTE ESTERASE UR QL STRIP.AUTO: NEGATIVE
NITRITE UR QL STRIP: NEGATIVE
PH UR STRIP.AUTO: 6.5 [PH] (ref 5–9)
POTASSIUM BLD-SCNC: 4 MMOL/L (ref 3.5–5.2)
PROT SERPL-MCNC: 6.5 G/DL (ref 6–8.5)
PROT UR QL STRIP: NEGATIVE
SODIUM BLD-SCNC: 139 MMOL/L (ref 136–145)
SP GR UR STRIP: 1.01 (ref 1–1.03)
UROBILINOGEN UR QL STRIP: NORMAL

## 2019-08-20 PROCEDURE — 80053 COMPREHEN METABOLIC PANEL: CPT | Performed by: OBSTETRICS & GYNECOLOGY

## 2019-08-20 PROCEDURE — G0378 HOSPITAL OBSERVATION PER HR: HCPCS

## 2019-08-20 PROCEDURE — 81003 URINALYSIS AUTO W/O SCOPE: CPT | Performed by: OBSTETRICS & GYNECOLOGY

## 2019-08-20 RX ORDER — METOCLOPRAMIDE 10 MG/1
10 TABLET ORAL EVERY 8 HOURS
Qty: 90 TABLET | Refills: 1 | Status: SHIPPED | OUTPATIENT
Start: 2019-08-20 | End: 2019-09-19

## 2019-08-20 RX ADMIN — ACETAMINOPHEN 650 MG: 325 TABLET, FILM COATED ORAL at 10:29

## 2019-08-20 RX ADMIN — METOCLOPRAMIDE HYDROCHLORIDE 10 MG: 10 TABLET ORAL at 06:06

## 2019-08-20 NOTE — DISCHARGE SUMMARY
[facility]  Millie Quevedo  : 1995  MRN: [id number]  CSN: [id number]    Discharge Summary      Date of Admission: 2019   Date of Discharge: 2019   Discharge Diagnosis: 1. Hyperemesis gravidarum   Procedures Performed: None      Hospital Course: Pt presented to ED with lower abd pain and persistent N/V despite zofran pump. She was given Morphine in ED for her lower abd pain, and tylenol was continued after admission with good results and relief of pain. Reglan was added to her regimen. Her zofran pump ran dry and there were several hours with IV zofran dosing until her pump was refilled and restarted. She had no episodes of vomiting overnight and stated she had much less nausea. She was tolerating a clear liquid diet and was advanced to full liquids for breakfast prior to discharge. She will be sent home on Reglan with close follow-up with OB/GYN.   Pending Studies:  Labs: None  Lab Results (last 72 hours)     Procedure Component Value Units Date/Time    Comprehensive Metabolic Panel [[id number]]  (Abnormal) Collected:  19    Specimen:  Blood Updated:  19     Glucose 92 mg/dL      BUN 4 mg/dL      Creatinine 0.50 mg/dL      Sodium 139 mmol/L      Potassium 4.0 mmol/L      Chloride 108 mmol/L      CO2 21.0 mmol/L      Calcium 8.5 mg/dL      Total Protein 6.5 g/dL      Albumin 3.40 g/dL      ALT (SGPT) 7 U/L      AST (SGOT) 10 U/L      Alkaline Phosphatase 51 U/L      Total Bilirubin 0.2 mg/dL      eGFR Non African Amer >150 mL/min/1.73      Globulin 3.1 gm/dL      A/G Ratio 1.1 g/dL      BUN/Creatinine Ratio 8.0     Anion Gap 10.0 mmol/L     Narrative:       GFR Normal >60  Chronic Kidney Disease <60  Kidney Failure <15    Comprehensive Metabolic Panel [[id number]]  (Abnormal) Collected:  19    Specimen:  Blood Updated:  19     Glucose 91 mg/dL      BUN 11 mg/dL      Creatinine 0.45 mg/dL      Sodium 138 mmol/L      Potassium 3.7 mmol/L       Chloride 103 mmol/L      CO2 20.0 mmol/L      Calcium 9.2 mg/dL      Total Protein 7.6 g/dL      Albumin 4.00 g/dL      ALT (SGPT) 7 U/L      AST (SGOT) 12 U/L      Alkaline Phosphatase 60 U/L      Total Bilirubin 0.2 mg/dL      eGFR Non African Amer >150 mL/min/1.73      Globulin 3.6 gm/dL      A/G Ratio 1.1 g/dL      BUN/Creatinine Ratio 24.4     Anion Gap 15.0 mmol/L     Narrative:       GFR Normal >60  Chronic Kidney Disease <60  Kidney Failure <15    Lipase [[id number]]  (Normal) Collected:  08/19/19 0525    Specimen:  Blood Updated:  08/19/19 0610     Lipase 14 U/L     Urinalysis With Culture If Indicated - Urine, Clean Catch [[id number]]  (Abnormal) Collected:  08/19/19 0520    Specimen:  Urine, Clean Catch Updated:  08/19/19 0543     Color, UA Yellow     Appearance, UA Clear     pH, UA 6.0     Specific Gravity, UA 1.029     Comment: Result obtained by Refractometer        Glucose,  mg/dL (Trace)     Ketones, UA Negative     Bilirubin, UA Negative     Blood, UA Negative     Protein, UA Negative     Leuk Esterase, UA Negative     Nitrite, UA Negative     Urobilinogen, UA 0.2 E.U./dL    Narrative:       Urine microscopic not indicated.    CBC & Differential [[id number]] Collected:  08/19/19 0525    Specimen:  Blood Updated:  08/19/19 0540    Narrative:       The following orders were created for panel order CBC & Differential.  Procedure                               Abnormality         Status                     ---------                               -----------         ------                     CBC Auto Differential[[id number]]        Abnormal            Final result                 Please view results for these tests on the individual orders.    CBC Auto Differential [[id number]]  (Abnormal) Collected:  08/19/19 0525    Specimen:  Blood Updated:  08/19/19 0540     WBC 12.52 10*3/mm3      RBC 4.26 10*6/mm3      Hemoglobin 12.5 g/dL      Hematocrit 36.9 %      MCV 86.6 fL      MCH 29.3 pg      MCHC 33.9  g/dL      RDW 12.3 %      RDW-SD 38.9 fl      MPV 9.8 fL      Platelets 246 10*3/mm3      Neutrophil % 78.5 %      Lymphocyte % 15.2 %      Monocyte % 4.9 %      Eosinophil % 0.4 %      Basophil % 0.4 %      Immature Grans % 0.6 %      Neutrophils, Absolute 9.84 10*3/mm3      Lymphocytes, Absolute 1.90 10*3/mm3      Monocytes, Absolute 0.61 10*3/mm3      Eosinophils, Absolute 0.05 10*3/mm3      Basophils, Absolute 0.05 10*3/mm3      Immature Grans, Absolute 0.07 10*3/mm3      nRBC 0.0 /100 WBC          Condition at Discharge: Stable   Discharge Diet: Diet Instructions     Diet: Regular      Discharge Diet:  Regular         Discharge Activity: Activity Instructions     Pelvic Rest           Discharge Medications:    Your medication list      START taking these medications      Instructions Last Dose Given Next Dose Due   metoclopramide 10 MG tablet  Commonly known as:  REGLAN      Take 1 tablet by mouth Every 8 (Eight) Hours for 30 days.          CHANGE how you take these medications      Instructions Last Dose Given Next Dose Due   promethazine 25 MG tablet  Commonly known as:  PHENERGAN  What changed:  Another medication with the same name was changed. Make sure you understand how and when to take each.      Take 0.5 tablets by mouth Every 8 (Eight) Hours As Needed for Nausea or Vomiting.       promethazine 25 MG tablet  Commonly known as:  PHENERGAN  What changed:  when to take this      Take 1 tablet by mouth Every 8 (Eight) Hours for 15 days.          CONTINUE taking these medications      Instructions Last Dose Given Next Dose Due   albuterol sulfate  (90 Base) MCG/ACT inhaler  Commonly known as:  PROVENTIL HFA;VENTOLIN HFA;PROAIR HFA      Inhale. As needed       ondansetron 8 MG tablet  Commonly known as:  ZOFRAN      Take 1 tablet by mouth 3 (Three) Times a Day for 15 days.       Prenatal Vitamin 27-0.8 MG tablet      Take 1 tablet by mouth Daily.       Scopolamine 1.5 MG/3DAYS patch  Commonly known  as:  TRANSDERM-SCOP      Place 1 patch on the skin as directed by provider Every 72 (Seventy-Two) Hours for 15 days.             Where to Get Your Medications      You can get these medications from any pharmacy    Bring a paper prescription for each of these medications  · metoclopramide 10 MG tablet        Discharge Disposition: home   Follow-up: Future Appointments   Date Time Provider Department Center   9/4/2019 10:30 AM Tyshawn Colon, DO [facility] None            This note has been electronically signed.    Radha Murguia MD  August 20, 2019  6:54 AM    This document has been electronically signed by Radha Murguia MD on August 20, 2019 6:58 AM

## 2019-08-20 NOTE — PLAN OF CARE
Problem: Hyperemesis Gravidarum (Adult,Obstetrics,Pediatric)  Goal: Signs and Symptoms of Listed Potential Problems Will be Absent, Minimized or Managed (Hyperemesis Gravidarum)  Outcome: Ongoing (interventions implemented as appropriate)   08/20/19 7976   Goal/Outcome Evaluation   Problems Assessed (Nausea/Vomiting/Hyperemesis in Pregnancy) all   Problems Present (Hyperemesis) none       Problem: Patient Care Overview  Goal: Plan of Care Review  Outcome: Ongoing (interventions implemented as appropriate)    Goal: Individualization and Mutuality  Outcome: Ongoing (interventions implemented as appropriate)    Goal: Discharge Needs Assessment  Outcome: Ongoing (interventions implemented as appropriate)    Goal: Interprofessional Rounds/Family Conf  Outcome: Ongoing (interventions implemented as appropriate)

## 2019-08-20 NOTE — PROGRESS NOTES
[facility]  Millie Quevedo  : 1995  MRN: [id number]  CSN: [id number]    L&D Triage note    Pt was able to get her zofran pump refilled and turned on around 2230 last night. She has been tolerating full liquids and would like to have a regular breakfast tray prior to discharge. She had no episodes of vomiting last night and has much less nausea with augmentation with reglan. Abd pain also resolved.    Min/max vitals past 24 hours:  Temp  Min: 98 °F (36.7 °C)  Max: 99.2 °F (37.3 °C)   BP  Min: 103/59  Max: 123/72   Pulse  Min: 73  Max: 92   Resp  Min: 18  Max: 18    Lab Results (most recent)     Procedure Component Value Units Date/Time    Comprehensive Metabolic Panel [[id number]] Collected:  19    Specimen:  Blood Updated:  19    Comprehensive Metabolic Panel [[id number]]  (Abnormal) Collected:  19    Specimen:  Blood Updated:  19     Glucose 91 mg/dL      BUN 11 mg/dL      Creatinine 0.45 mg/dL      Sodium 138 mmol/L      Potassium 3.7 mmol/L      Chloride 103 mmol/L      CO2 20.0 mmol/L      Calcium 9.2 mg/dL      Total Protein 7.6 g/dL      Albumin 4.00 g/dL      ALT (SGPT) 7 U/L      AST (SGOT) 12 U/L      Alkaline Phosphatase 60 U/L      Total Bilirubin 0.2 mg/dL      eGFR Non African Amer >150 mL/min/1.73      Globulin 3.6 gm/dL      A/G Ratio 1.1 g/dL      BUN/Creatinine Ratio 24.4     Anion Gap 15.0 mmol/L     Narrative:       GFR Normal >60  Chronic Kidney Disease <60  Kidney Failure <15    Lipase [[id number]]  (Normal) Collected:  19    Specimen:  Blood Updated:  19     Lipase 14 U/L     Urinalysis With Culture If Indicated - Urine, Clean Catch [[id number]]  (Abnormal) Collected:  19    Specimen:  Urine, Clean Catch Updated:  19     Color, UA Yellow     Appearance, UA Clear     pH, UA 6.0     Specific Gravity, UA 1.029     Comment: Result obtained by Refractometer        Glucose,  mg/dL (Trace)      Ketones, UA Negative     Bilirubin, UA Negative     Blood, UA Negative     Protein, UA Negative     Leuk Esterase, UA Negative     Nitrite, UA Negative     Urobilinogen, UA 0.2 E.U./dL    Narrative:       Urine microscopic not indicated.    CBC & Differential [[id number]] Collected:  19    Specimen:  Blood Updated:  19    Narrative:       The following orders were created for panel order CBC & Differential.  Procedure                               Abnormality         Status                     ---------                               -----------         ------                     CBC Auto Differential[[id number]]        Abnormal            Final result                 Please view results for these tests on the individual orders.    CBC Auto Differential [[id number]]  (Abnormal) Collected:  19    Specimen:  Blood Updated:  19     WBC 12.52 10*3/mm3      RBC 4.26 10*6/mm3      Hemoglobin 12.5 g/dL      Hematocrit 36.9 %      MCV 86.6 fL      MCH 29.3 pg      MCHC 33.9 g/dL      RDW 12.3 %      RDW-SD 38.9 fl      MPV 9.8 fL      Platelets 246 10*3/mm3      Neutrophil % 78.5 %      Lymphocyte % 15.2 %      Monocyte % 4.9 %      Eosinophil % 0.4 %      Basophil % 0.4 %      Immature Grans % 0.6 %      Neutrophils, Absolute 9.84 10*3/mm3      Lymphocytes, Absolute 1.90 10*3/mm3      Monocytes, Absolute 0.61 10*3/mm3      Eosinophils, Absolute 0.05 10*3/mm3      Basophils, Absolute 0.05 10*3/mm3      Immature Grans, Absolute 0.07 10*3/mm3      nRBC 0.0 /100 WBC                 Assessment  1. 22 y/o  at 17w3d with hyperemesis gravidarum. Active zofran pump.    Plan  1. Advance diet as tolerated  2. D/C home today with augmentation of guero Murguia MD PGY-1    This document has been electronically signed by Radha Murguia MD on 2019 6:10 AM      This document has been electronically signed by Radha Murguia MD on 2019 6:07 AM

## 2019-09-04 ENCOUNTER — ROUTINE PRENATAL (OUTPATIENT)
Dept: OBSTETRICS AND GYNECOLOGY | Facility: CLINIC | Age: 24
End: 2019-09-04

## 2019-09-04 VITALS — DIASTOLIC BLOOD PRESSURE: 62 MMHG | SYSTOLIC BLOOD PRESSURE: 99 MMHG | WEIGHT: 190 LBS | BODY MASS INDEX: 26.5 KG/M2

## 2019-09-04 DIAGNOSIS — O26.892 RH NEGATIVE STATUS DURING PREGNANCY IN SECOND TRIMESTER: Primary | ICD-10-CM

## 2019-09-04 DIAGNOSIS — O21.0 HYPEREMESIS GRAVIDARUM: ICD-10-CM

## 2019-09-04 DIAGNOSIS — Z3A.19 19 WEEKS GESTATION OF PREGNANCY: ICD-10-CM

## 2019-09-04 DIAGNOSIS — Z67.91 RH NEGATIVE STATUS DURING PREGNANCY IN SECOND TRIMESTER: Primary | ICD-10-CM

## 2019-09-04 PROBLEM — R11.2 INTRACTABLE VOMITING WITH NAUSEA: Status: RESOLVED | Noted: 2019-08-06 | Resolved: 2019-09-04

## 2019-09-04 PROCEDURE — 0502F SUBSEQUENT PRENATAL CARE: CPT | Performed by: OBSTETRICS & GYNECOLOGY

## 2019-09-04 NOTE — PROGRESS NOTES
CC: LUCY visit    Patient Active Problem List   Diagnosis   • Rh negative status during pregnancy in second trimester   • Hyperemesis gravidarum       HPI: 23 y.o.   GA: 19w4d DELFINO: 2020, by Last Menstrual Period  Here for follow up prenatal care.  Positive fetal movement denies any cramping at this time.  Patient states that she has had a few episodes of small amounts of vaginal bleeding none occurring currently.  Patient is currently on Zofran pump for hyperemesis and feels that symptoms are moderately controlled.  She is able to eat and drink and tolerate something she does have good days and bad days.  Patient is requesting [facility] paperwork to remain off of work for the remainder of the pregnancy secondary to the hyperemesis I felt that this was reasonable, and will fill out paperwork when it arrives.  No other concerns or complaints today.  Patient had growth scan done today which overall appeared reassuring on preliminary read some suboptimal views will likely need repeat scan in 3 weeks.    Labs:   No results found for: HGBA1C  Glucose   Date Value Ref Range Status   2019 92 65 - 99 mg/dL Final   2019 91 65 - 99 mg/dL Final   2019 89 65 - 99 mg/dL Final     Last Completed Pap Smear       Status Date      PAP SMEAR Done 2019 LIQUID-BASED PAP SMEAR, SCREENING     Patient has more history with this topic...          Radiology:    Each of the above were reviewed and integrated into prenatal care plan.    P/E:  See Vitals flowsheet    A/P: Appropriately progressing pregnancy complicated by hyperemesis gravidarum.  Symptoms overall slowly improving at this time with Zofran pump, plan to continue patient on Zofran pump.  Patient to return to see me in 2 weeks, will likely need repeat growth scan in 3 weeks secondary to suboptimal views.  Patient to return sooner as needed.     Diagnosis Plan   1. Rh negative status during pregnancy in second trimester     2. Hyperemesis gravidarum      3. 19 weeks gestation of pregnancy

## 2019-09-05 ENCOUNTER — TELEPHONE (OUTPATIENT)
Dept: OBSTETRICS AND GYNECOLOGY | Facility: CLINIC | Age: 24
End: 2019-09-05

## 2019-09-05 NOTE — TELEPHONE ENCOUNTER
----- Message from Ekta Ortiz CSA sent at 9/5/2019  2:20 PM CDT -----  Contact: [phone]  This patient and her  is asking for a dr's excuse for a certain day, they wouldn't tell me the day.  They also have several other questions for him.

## 2019-09-06 ENCOUNTER — TELEPHONE (OUTPATIENT)
Dept: OBSTETRICS AND GYNECOLOGY | Facility: CLINIC | Age: 24
End: 2019-09-06

## 2019-09-06 NOTE — TELEPHONE ENCOUNTER
----- Message from Ekta Ortiz CSA sent at 9/5/2019  2:20 PM CDT -----  Contact: [phone]  This patient and her  is asking for a dr's excuse for a certain day, they wouldn't tell me the day.  They also have several other questions for him.

## 2019-09-16 DIAGNOSIS — O21.0 MODERATE HYPEREMESIS GRAVIDARUM: ICD-10-CM

## 2019-09-16 DIAGNOSIS — Z36.3 ENCOUNTER FOR ANTENATAL SCREENING FOR MALFORMATION USING ULTRASOUND: ICD-10-CM

## 2019-09-16 DIAGNOSIS — Z3A.16 16 WEEKS GESTATION OF PREGNANCY: ICD-10-CM

## 2019-09-16 RX ORDER — VITAMIN A, VITAMIN C, VITAMIN D-3, VITAMIN E, VITAMIN B-1, VITAMIN B-2, NIACIN, VITAMIN B-6, CALCIUM, IRON, ZINC, COPPER 4000; 120; 400; 22; 1.84; 3; 20; 10; 1; 12; 200; 27; 25; 2 [IU]/1; MG/1; [IU]/1; MG/1; MG/1; MG/1; MG/1; MG/1; MG/1; UG/1; MG/1; MG/1; MG/1; MG/1
TABLET ORAL
Qty: 30 TABLET | Refills: 12 | Status: SHIPPED | OUTPATIENT
Start: 2019-09-16 | End: 2020-02-05

## 2019-09-18 ENCOUNTER — TELEPHONE (OUTPATIENT)
Dept: OBSTETRICS AND GYNECOLOGY | Facility: CLINIC | Age: 24
End: 2019-09-18

## 2019-09-18 ENCOUNTER — ROUTINE PRENATAL (OUTPATIENT)
Dept: OBSTETRICS AND GYNECOLOGY | Facility: CLINIC | Age: 24
End: 2019-09-18

## 2019-09-18 VITALS — SYSTOLIC BLOOD PRESSURE: 102 MMHG | DIASTOLIC BLOOD PRESSURE: 69 MMHG | BODY MASS INDEX: 27.06 KG/M2 | WEIGHT: 194 LBS

## 2019-09-18 DIAGNOSIS — M54.9 BACK PAIN AFFECTING PREGNANCY IN SECOND TRIMESTER: ICD-10-CM

## 2019-09-18 DIAGNOSIS — O21.0 HYPEREMESIS GRAVIDARUM: ICD-10-CM

## 2019-09-18 DIAGNOSIS — Z03.79 ENCOUNTER FOR OTHER SUSPECTED MATERNAL AND FETAL CONDITIONS RULED OUT: ICD-10-CM

## 2019-09-18 DIAGNOSIS — O26.892 RH NEGATIVE STATUS DURING PREGNANCY IN SECOND TRIMESTER: Primary | ICD-10-CM

## 2019-09-18 DIAGNOSIS — O26.892 PREGNANCY RELATED HIP PAIN IN SECOND TRIMESTER, ANTEPARTUM: ICD-10-CM

## 2019-09-18 DIAGNOSIS — Z3A.21 21 WEEKS GESTATION OF PREGNANCY: ICD-10-CM

## 2019-09-18 DIAGNOSIS — Z67.91 RH NEGATIVE STATUS DURING PREGNANCY IN SECOND TRIMESTER: Primary | ICD-10-CM

## 2019-09-18 DIAGNOSIS — M25.559 PREGNANCY RELATED HIP PAIN IN SECOND TRIMESTER, ANTEPARTUM: ICD-10-CM

## 2019-09-18 DIAGNOSIS — O99.891 BACK PAIN AFFECTING PREGNANCY IN SECOND TRIMESTER: ICD-10-CM

## 2019-09-18 PROCEDURE — 0502F SUBSEQUENT PRENATAL CARE: CPT | Performed by: OBSTETRICS & GYNECOLOGY

## 2019-09-18 NOTE — TELEPHONE ENCOUNTER
Patient returned call. We discussed group prenatal education. She is interested in attending. She is added to the January group. Her appointment is September 26 at 1pm.     I called patient to see if she is interested in doing group prenatal education. I left voicemail for patient to return call.

## 2019-09-18 NOTE — PROGRESS NOTES
CC: Prenatal visit    Millie Quevedo is a 23 y.o.  at 21w4d.  Doing well.  No complaints.  Denies contractions, LOF, or VB.  Reports good FM.  Continues to have some nausea and vomiting however much better controlled on Zofran pump.  No other concerns or complaints at this time.  Reviewed anatomy scan which had suboptimal views.    /69   Wt 88 kg (194 lb)   LMP 2019 (Exact Date)   BMI 27.06 kg/m²   FH: 21 cm  FHT: 145 bpm     Problems (from 19 to present)     No problems associated with this episode.          A/P: Millie Quevedo is a 23 y.o.  at 21w4d.  Proprial he progressing pregnancy, complicated by hyperemesis gravidarum.  Patient also now with low back and hip pain would like to be seen by physical therapy.  - RTC in 2 weeks  -Bleeding and cramping precautions  -Continue on Zofran pump  -Repeat ultrasound for suboptimal views in 1 to 2 weeks  -Consult to physical therapy for low back and hip pain  -Return sooner as needed.     Diagnosis Plan   1. Rh negative status during pregnancy in second trimester     2. Hyperemesis gravidarum     3. 21 weeks gestation of pregnancy     4. Encounter for other suspected maternal and fetal conditions ruled out  US Ob Follow Up Transabdominal Approach   5. Back pain affecting pregnancy in second trimester     6. Pregnancy related hip pain in second trimester, antepartum  Ambulatory Referral to Physical Therapy     Tyshawn Colon DO  2019  10:51 AM

## 2019-09-24 ENCOUNTER — HOSPITAL ENCOUNTER (OUTPATIENT)
Dept: PHYSICAL THERAPY | Facility: HOSPITAL | Age: 24
Setting detail: THERAPIES SERIES
Discharge: HOME | End: 2019-09-24

## 2019-09-24 DIAGNOSIS — M54.50 LOW BACK PAIN DURING PREGNANCY IN SECOND TRIMESTER: ICD-10-CM

## 2019-09-24 DIAGNOSIS — M25.559 PREGNANCY RELATED HIP PAIN IN SECOND TRIMESTER, ANTEPARTUM: Primary | ICD-10-CM

## 2019-09-24 DIAGNOSIS — O26.892 PREGNANCY RELATED HIP PAIN IN SECOND TRIMESTER, ANTEPARTUM: Primary | ICD-10-CM

## 2019-09-24 DIAGNOSIS — O26.892 LOW BACK PAIN DURING PREGNANCY IN SECOND TRIMESTER: ICD-10-CM

## 2019-09-24 PROCEDURE — 97162 PT EVAL MOD COMPLEX 30 MIN: CPT | Performed by: PHYSICAL THERAPIST

## 2019-09-24 PROCEDURE — 97110 THERAPEUTIC EXERCISES: CPT | Performed by: PHYSICAL THERAPIST

## 2019-09-24 NOTE — THERAPY EVALUATION
Outpatient Physical Therapy Pelvic Health Initial Evaluation  [facility]     Patient Name: Millie Quevedo  : 1995  MRN: [id number]  Today's Date: 2019        Visit Date: 2019  Visit number:   Recheck: 10/24/19  Insurance: [employer]    Patient Active Problem List   Diagnosis   • Rh negative status during pregnancy in second trimester   • Hyperemesis gravidarum        Past Medical History:   Diagnosis Date   • Abnormal Pap smear of cervix    • Acute maxillary sinusitis    • Acute otitis externa    • Acute pharyngitis    • Acute tonsillitis    • Allergic asthma     IgE-mediated allergic asthma     • Allergic rhinitis    • Allergic rhinitis due to pollen    • Anxiety    • Asthma    • Chondromalacia of patella     left knee    • Common cold    • Cough    • Diarrhea    • Disorder of gallbladder     Probable biliary dyskinesia    • Epigastric pain    • Foot pain    • Gastroenteritis    • Generalized abdominal pain    • Headache    • History of colonoscopy 2013    Colon endoscopy 42825 (1)  -  Internal & external hemorrhoids found.   • History of esophagogastroduodenoscopy (EGD) 2013    w/ tube 63987 (1)  -  Normal esophagus. Gastritis in stomach. Biopsy taken. Normal duodenum. Biospy taken   • History of marijuana use    • HPV (human papilloma virus) infection    • IBS (irritable bowel syndrome)    • Influenza    • Irregular periods    • Irritable bowel syndrome    • Migraine    • Nausea    • Nausea and vomiting    • Osgood-Schlatter's disease    • Pain in throat    • Patellar tendonitis    • Right upper quadrant pain    • Streptococcal sore throat    • Temporomandibular joint disorder     WISDOM TEETH    • Upper respiratory infection    • Urgency of urination    • Urinary tract infectious disease    • Varicella    • Viral gastroenteritis    • Vulvovaginitis         Past Surgical History:   Procedure Laterality Date   • CHOLECYSTECTOMY  2013    Laparoscopic  -   "laparoscopic cholecystectomy with intraoperative cholangiogram. Cholecystitis.   • INJECTION OF MEDICATION  01/08/2013    Toradol (1)   -  FLORY Sotomayor   • LAPAROSCOPIC CHOLECYSTECTOMY     • WISDOM TOOTH EXTRACTION           Visit Dx:    ICD-10-CM ICD-9-CM   1. Pregnancy related hip pain in second trimester, antepartum O26.892 646.83    M25.559 719.45   2. Low back pain during pregnancy in second trimester O26.892 646.80    M54.5 724.2           Pelvic Health     Row Name 09/24/19 1000             Pregnancy Questions    Number of Pregnancies  1  -SW      Number of Miscarriages  0  -SW      How many weeks pregnant are you?  22 weeks  -SW      Due Date  01/25/20  -        User Key  (r) = Recorded By, (t) = Taken By, (c) = Cosigned By    Initials Name Provider Type    Bernice Roman, PT Physical Therapist        PT Ortho     Row Name 09/24/19 1000       Subjective Comments    Subjective Comments  Pain started about 2 weeks ago.  Cramping pain in bottom of hips and lower back.  Chiropractor in past prior to pregnancy and was told that she had one hip was \"bigger\" than the other.  Day started pain she turned quickly with L leg planted and felt pop in L hip.  Pain radiated into L leg.  Worked its way out after a while.  Pain in middle of LB and L side primarily.  Hard to lay around at night and get comfortable.  [employer] employee but no work since August due to hyperemesis.  Ocassional L radicular s/s to thigh region.  No radiation to calf and ankle. Tylenol and maternity pillow used for positioning.    -       Subjective Pain    Able to rate subjective pain?  yes  -    Pre-Treatment Pain Level  6  -    Subjective Pain Comment  elevates to 8/10 at night.   -SW       Posture/Observations    Posture- WNL  Posture is WNL  -SW    Posture/Observations Comments  Gait unremarkable with equal step and stride noted bilaterally.  Standing posture revealed slight elevation in R shoulder and IC in standing.   -SW       " Quarter Clearing    Quarter Clearing  Lower Quarter Clearing  -SW       DTR- Lower Quarter Clearing    Patellar tendon (L2-4)  2- Normal response  -SW    Achilles tendon (S1-2)  2- Normal response  -SW       Neural Tension Signs- Lower Quarter Clearing    Slump  Left:;Positive  -SW    SLR  Left:;Positive  -SW    Prone knee flexion  Negative  -SW       Sensory Screen for Light Touch- Lower Quarter Clearing    L1 (inguinal area)  Intact  -SW    L2 (anterior mid thigh)  Intact  -SW    L3 (distal anterior thigh)  Intact  -SW    L4 (medial lower leg/foot)  Intact  -SW    L5 (lateral lower leg/great toe)  Intact  -SW    S1 (bottom of foot)  Intact  -SW       Myotomal Screen- Lower Quarter Clearing    Hip flexion (L2)  5 (Normal)  -SW    Knee extension (L3)  5 (Normal)  -SW    Ankle DF (L4)  WNL  -SW    Great toe extension (L5)  WNL  -SW    Ankle PF (S1)  WNL  -SW    Knee flexion (S2)  5 (Normal)  -SW       Lumbar ROM Screen- Lower Quarter Clearing    Lumbar Flexion  -- 50% reduced  -SW    Lumbar Extension  Normal  -SW    Lumbar Lateral Flexion  -- R SB inc pain on L  -SW       SI/Hip Screen- Lower Quarter Clearing    ASIS compression  Right:;Positive  -SW    ASIS distraction  Negative  -SW    Yesenia's/Brad's test  Bilateral:;Positive  -SW    Posterior thigh sheer  Left:;Positive  -SW    Pain in Melita's area  Bilateral:;Positive  -SW       Special Tests/Palpation    Special Tests/Palpation  Lumbar/SI;Hip  -SW       Lumbosacral Accessory Motions    Lumbosacral Accessory Motions Tested?  Yes  -SW    PA Glide- L1  Hypomobile  -SW    PA Glide- L2  Hypomobile  -SW    PA Glide- L3  Hypomobile  -SW    PA Glide- L4  Hypomobile  -SW    PA Glide- L5  Hypomobile  -SW    PA glide- Sacral base  -- L on R sacral torsion  -SW    Innominate rotation  -- ant innom on L side  -SW       Lumbosacral Palpation    Lumbosacral Palpation?  Yes  -SW    SI  -- dec AP glide with pain bilaterally   -SW    Lumbosacral Segment   Bilateral:;Guarded/taut  -SW    Thoracolumbar Segment  -- dec AP glide  -SW    Piriformis  Bilateral:;Guarded/taut  -SW    Quadratus Lumborum  Bilateral:;Guarded/taut  -SW    Lumbosacral Palpation Comments  Patient presents with dec alignment to pelvic girdle along with dec AP glide to multi-level segments of T/L region and sacral region.  -SW       Hip/Thigh Palpation    Hip/Thigh Palpation?  Yes  -SW    Greater Trochanter  Left:;Tender  -SW       Hip Special Tests    ELLA (hip vs SI pathology)  Positive  -SW    Hip scour test (labral vs hip pathology)  Left:;Positive  -SW       General ROM    GENERAL ROM COMMENTS  functional LE motion noted without restrictions  -SW       MMT (Manual Muscle Testing)    General MMT Comments  grossly functional in all planes.   -SW       Balance Skills Training    Balance Comments  normal static and dynamic balance.   -SW       Transfers    Comment (Transfers)  independent  -SW       Gait/Stairs Assessment/Training    Comment (Gait/Stairs)  unremarkable; no toe drag present.  -SW      User Key  (r) = Recorded By, (t) = Taken By, (c) = Cosigned By    Initials Name Provider Type    Bernice Roman, PT Physical Therapist                     PT Assessment/Plan     Row Name 09/24/19 1000          PT Assessment    Functional Limitations  Performance in self-care ADL;Performance in leisure activities;Limitations in functional capacity and performance;Limitations in community activities;Limitation in home management  -SW     Impairments  Impaired muscle length;Impaired postural alignment;Pain;Poor body mechanics;Posture  -SW     Assessment Comments  Patient is a 24 yo female presenting to clinic with musculoskeletal complaints of LB/SI and hip pain primarily on L side.  Patient presents with ant innom on L side and sacral torsion L on R with significant dec in AP mobility to LS spine.  There is some concern re: early degeneration to spine due to hypomotiblity present.  She  "additionally has questionable labral complaints with L hip.  Will continue to monitor with clinical progression.  She would benefit from skilled therapy intervention to improve pain, function, and tolerance to daily activities with less restrictions.   -     Rehab Potential  Good  -     Patient/caregiver participated in establishment of treatment plan and goals  Yes  -SW     Patient would benefit from skilled therapy intervention  Yes  -SW        PT Plan    PT Frequency  1x/week  -     Predicted Duration of Therapy Intervention (Therapy Eval)  12-15 visits  -     PT Plan Comments  Manual therapy as needed for mobilization and tissue release. Ther ex for flexibility, strength and stabilization. Posture and body mechanics as indicated.   -       User Key  (r) = Recorded By, (t) = Taken By, (c) = Cosigned By    Initials Name Provider Type    SW Bernice Malhotra, PT Physical Therapist            OP Exercises     Row Name 09/24/19 1000             Subjective Comments    Subjective Comments  Pain started about 2 weeks ago.  Cramping pain in bottom of hips and lower back.  Chiropractor in past prior to pregnancy and was told that she had one hip was \"bigger\" than the other.  Day started pain she turned quickly with L leg planted and felt pop in L hip.  Pain radiated into L leg.  Worked its way out after a while.  Pain in middle of LB and L side primarily.  Hard to lay around at night and get comfortable.  [employer] employee but no work since August due to hyperemesis.  Ocassional L radicular s/s to thigh region.  No radiation to calf and ankle. Tylenol and maternity pillow used for positioning.    -SW         Subjective Pain    Able to rate subjective pain?  yes  -      Pre-Treatment Pain Level  6  -SW      Subjective Pain Comment  elevates to 8/10 at night.   -SW         Exercise 1    Exercise Name 1  angry cat stretch   -SW      Reps 1  10  -SW      Time 1  5 sec  -SW         Exercise 2    Exercise Name 2  " piriformis stretch   -SW      Reps 2  3  -SW      Time 2  30 sec  -SW         Exercise 3    Exercise Name 3  hamstring stretch   -SW      Reps 3  3  -SW      Time 3  30 sec  -SW        User Key  (r) = Recorded By, (t) = Taken By, (c) = Cosigned By    Initials Name Provider Type    Bernice Roman, PT Physical Therapist                      PT OP Goals     Row Name 09/24/19 1000          PT Short Term Goals    STG Date to Achieve  10/24/19  -     STG 1  patient to be independent and compliant with HEp daily as to improve flexibility, strength and dec pain.  -     STG 1 Progress  New  -     STG 2  patient to present with improved alignment in non WB position as to improve pelvic girdle alignment  -     STG 2 Progress  New  -     STG 3  Patient to report centralized s/s out of LE on L and into buttock and LB.  -     STG 3 Progress  New  -     STG 4  patient to recognize neutral spine mechanics and maintain position with ther ex x 15 minutes without cues required for positioning.  -     STG 4 Progress  New  -     STG 5  patient to report night pain no greater than 4/10 which is 50% reduction in initial complaints   -     STG 5 Progress  New  -        Long Term Goals    LTG Date to Achieve  01/24/20  -     LTG 1  Mod Oswestry score of 12% or less indicating dec restrictions with daily function.   -     LTG 1 Progress  New  -     LTG 2  patient to manage daily activities with pain less than 4/10 at all times  -     LTG 2 Progress  New  -     LTG 3  Patient to manage successfully her s/s without inc need for Tylenol for assist.   -     LTG 3 Progress  New  -     LTG 4  subjectively 70% improved since induction of PT.   -     LTG 4 Progress  New  -        Time Calculation    PT Goal Re-Cert Due Date  10/24/19  -       User Key  (r) = Recorded By, (t) = Taken By, (c) = Cosigned By    Initials Name Provider Type    Bernice Roman, PT Physical Therapist          Therapy  Education  Given: HEP  Program: New  How Provided: Verbal, Written, Demonstration  Provided to: Patient  Level of Understanding: Teach back education performed, Verbalized, Demonstrated     Outcome Measure Options: Modifed Owestry  Modified Oswestry  Modified Oswestry Score/Comments: 16/50=32%      Time Calculation:   Start Time: 1018  Stop Time: 1115  Time Calculation (min): 57 min  Therapy Charges for Today     Code Description Service Date Service Provider Modifiers Qty    68466872267 HC PT EVAL MOD COMPLEXITY 3 9/24/2019 Bernice Malhotra, PT GP 1    66085155377 HC PT THER PROC EA 15 MIN 9/24/2019 Bernice Malhotra, PT GP 1          PT G-Codes  Outcome Measure Options: Modifed Owestry  Modified Oswestry Score/Comments: 16/50=32%       Bernice Malhotra, PT  9/24/2019

## 2019-09-26 ENCOUNTER — TELEPHONE (OUTPATIENT)
Dept: OBSTETRICS AND GYNECOLOGY | Facility: CLINIC | Age: 24
End: 2019-09-26

## 2019-09-26 ENCOUNTER — ROUTINE PRENATAL (OUTPATIENT)
Dept: OBSTETRICS AND GYNECOLOGY | Facility: CLINIC | Age: 24
End: 2019-09-26

## 2019-09-26 VITALS — DIASTOLIC BLOOD PRESSURE: 67 MMHG | SYSTOLIC BLOOD PRESSURE: 108 MMHG | BODY MASS INDEX: 27.2 KG/M2 | WEIGHT: 195 LBS

## 2019-09-26 DIAGNOSIS — Z34.02 ENCOUNTER FOR SUPERVISION OF NORMAL FIRST PREGNANCY IN SECOND TRIMESTER: Primary | ICD-10-CM

## 2019-09-26 NOTE — TELEPHONE ENCOUNTER
DARRIAN FROM [employer] IS CALLING TO SAY THEY HAVE TRYING TO CONTACT HER FOR 4 DAYS TO SENT OUT HER MEDICATION ZOFRAN. AND ARE UNABLE TO REACH HER. PATIENT CAME IN TODAY FOR AN APPOINTMENT AND GAVE HER THE MESSAGE

## 2019-09-26 NOTE — PROGRESS NOTES
Patient is here for group prenatal education. 75 minutes was spent in discussion on common discomforts in pregnancy. Some of the topics discussed were round ligament pain, constipation, heartburn etc. The patient complains of round ligament pain today. We discussed things that may help alleviate the pain. She is given the future dates for group prenatal education.

## 2019-10-01 ENCOUNTER — HOSPITAL ENCOUNTER (OUTPATIENT)
Dept: PHYSICAL THERAPY | Facility: HOSPITAL | Age: 24
Setting detail: THERAPIES SERIES
Discharge: HOME | End: 2019-10-01

## 2019-10-01 DIAGNOSIS — O26.892 LOW BACK PAIN DURING PREGNANCY IN SECOND TRIMESTER: ICD-10-CM

## 2019-10-01 DIAGNOSIS — M25.559 PREGNANCY RELATED HIP PAIN IN SECOND TRIMESTER, ANTEPARTUM: Primary | ICD-10-CM

## 2019-10-01 DIAGNOSIS — O26.892 PREGNANCY RELATED HIP PAIN IN SECOND TRIMESTER, ANTEPARTUM: Primary | ICD-10-CM

## 2019-10-01 DIAGNOSIS — M54.50 LOW BACK PAIN DURING PREGNANCY IN SECOND TRIMESTER: ICD-10-CM

## 2019-10-01 PROCEDURE — 97110 THERAPEUTIC EXERCISES: CPT | Performed by: PHYSICAL THERAPIST

## 2019-10-01 PROCEDURE — 97140 MANUAL THERAPY 1/> REGIONS: CPT | Performed by: PHYSICAL THERAPIST

## 2019-10-01 NOTE — THERAPY TREATMENT NOTE
Outpatient Physical Therapy Pelvic Health Treatment Note  [facility]     Patient Name: Millie Quevedo  : 1995  MRN: [id number]  Today's Date: 10/1/2019        Visit Date: 10/01/2019  Visit Number:   Recheck: 10/24/19  Insurance: [facility]    Visit Dx:    ICD-10-CM ICD-9-CM   1. Pregnancy related hip pain in second trimester, antepartum O26.892 646.83    M25.559 719.45   2. Low back pain during pregnancy in second trimester O26.892 646.80    M54.5 724.2       Patient Active Problem List   Diagnosis   • Rh negative status during pregnancy in second trimester   • Hyperemesis gravidarum        Pelvic Health     Row Name 10/01/19 1300             Pregnancy Questions    Number of Pregnancies  1  -SW      Number of Miscarriages  0  -SW      How many weeks pregnant are you?  23 weeks  -SW      Due Date  20  -SW        User Key  (r) = Recorded By, (t) = Taken By, (c) = Cosigned By    Initials Name Provider Type    Bernice Roman, PT Physical Therapist        PT Ortho     Row Name 10/01/19 1300       Subjective Comments    Subjective Comments  Really sore after she left last appt.  Back and hips have been killing me last few days.  Nothing different with activity level.  Pain in hip and LB is pretty constant.  Has been doing the stretches but still with discomfort.  Sometimes the muscles will help with stretching.  No radiation of pain.   -SW       Subjective Pain    Able to rate subjective pain?  yes  -SW    Pre-Treatment Pain Level  5  -SW    Post-Treatment Pain Level  3  -SW       Posture/Observations    Posture- WNL  Posture is WNL  -SW    Posture/Observations Comments  presents alert and oriented x 3.  Good nonantalgic gait noted.   -SW       Lumbosacral Accessory Motions    Lumbosacral Accessory Motions Tested?  -- continues with hypomobility throughout lower spine  -SW    PA glide- Sacral base  -- improved position to sacrum   -SW    Innominate rotation  -- improved symmetry  this visit.   -SW       Lumbosacral Palpation    SI  -- dec glide noted but improved position to sacrum   -SW    Lumbosacral Palpation Comments  Patient continues with multi-level hypomobility of LS.  Sacral mobility reduced as well.  Tenderness at GTB primary source of discomfort this date.  L > R in terms of discomfort.  Tension throughout ITB noted bilaterally as well.   -SW       Hip/Thigh Palpation    Greater Trochanter  Bilateral:;Tender L side > than R  -SW       Hip Special Tests    ELLA (hip vs SI pathology)  Positive  -SW    Hip scour test (labral vs hip pathology)  Left:;Positive  -SW      User Key  (r) = Recorded By, (t) = Taken By, (c) = Cosigned By    Initials Name Provider Type    Bernice Roman PT Physical Therapist                     PT Assessment/Plan     Row Name 10/01/19 1300          PT Assessment    Assessment Comments  Patient presented this visit with improved alignment to SI and pelvic girdle.  ITB and GTB noted bilateral hips.  L seems more prevalent with pain than R but present bilaterally.  Responded well to manual work and may benefit from release to LB/sacral region next visit.  Understands HEP and complies with reommendations given.    -SW     Rehab Potential  Good  -SW     Patient/caregiver participated in establishment of treatment plan and goals  Yes  -SW     Patient would benefit from skilled therapy intervention  Yes  -SW        PT Plan    PT Frequency  1x/week  -SW     Predicted Duration of Therapy Intervention (Therapy Eval)  12-15 visits  -SW     PT Plan Comments  continue with manual release to ITB bilaterally as needed.  LB assessment and cupping as needed.   -SW       User Key  (r) = Recorded By, (t) = Taken By, (c) = Cosigned By    Initials Name Provider Type    Bernice Roman PT Physical Therapist            OP Exercises     Row Name 10/01/19 1300             Subjective Comments    Subjective Comments  Really sore after she left last appt.  Back and  hips have been killing me last few days.  Nothing different with activity level.  Pain in hip and LB is pretty constant.  Has been doing the stretches but still with discomfort.  Sometimes the muscles will help with stretching.  No radiation of pain.   -SW         Subjective Pain    Able to rate subjective pain?  yes  -SW      Pre-Treatment Pain Level  5  -SW      Post-Treatment Pain Level  3  -SW         Exercise 1    Exercise Name 1  Hamstring stretch seated  -SW      Reps 1  3  -SW      Time 1  30 sec  -SW         Exercise 2    Exercise Name 2  adductor stretch   -SW      Reps 2  3  -SW      Time 2  30 sec  -SW         Exercise 3    Exercise Name 3  ITB stretch bilaterally sidelying   -SW      Reps 3  3  -SW      Time 3  30 sec  -SW         Exercise 4    Exercise Name 4  piriformis stretch   -SW      Reps 4  3  -SW      Time 4  30 sec  -SW         Exercise 5    Exercise Name 5  isometric TA with log roll   -SW      Additional Comments  encouraged isometric contraction of TA to assist with rolling and t/fs.    -        User Key  (r) = Recorded By, (t) = Taken By, (c) = Cosigned By    Initials Name Provider Type    Bernice Roman PT Physical Therapist           Manual Rx (last 36 hours)      Manual Treatments     Row Name 10/01/19 1300             Manual Rx 1    Manual Rx 1 Location  alignment assessment  -SW         Manual Rx 2    Manual Rx 2 Location  ITB release bilaterally  -SW      Manual Rx 2 Type  MFR and cupping  -SW        User Key  (r) = Recorded By, (t) = Taken By, (c) = Cosigned By    Initials Name Provider Type    Bernice Roman PT Physical Therapist                    PT OP Goals     Row Name 10/01/19 1300          PT Short Term Goals    STG Date to Achieve  10/24/19  -     STG 1  patient to be independent and compliant with HEp daily as to improve flexibility, strength and dec pain.  -SW     STG 1 Progress  Progressing  -     STG 2  patient to present with improved alignment  in non WB position as to improve pelvic girdle alignment  -     STG 2 Progress  New  -     STG 3  Patient to report centralized s/s out of LE on L and into buttock and LB.  -     STG 3 Progress  New  -     STG 4  patient to recognize neutral spine mechanics and maintain position with ther ex x 15 minutes without cues required for positioning.  -     STG 4 Progress  New  -     STG 5  patient to report night pain no greater than 4/10 which is 50% reduction in initial complaints   -[facility] 5 Progress  New  -        Long Term Goals    LTG Date to Achieve  01/24/20  -     LTG 1  Mod Oswestry score of 12% or less indicating dec restrictions with daily function.   -     LTG 1 Progress  New  -     LTG 2  patient to manage daily activities with pain less than 4/10 at all times  -     LTG 2 Progress  New  -     LTG 3  Patient to manage successfully her s/s without inc need for Tylenol for assist.   -     LTG 3 Progress  New  -     LTG 4  subjectively 70% improved since induction of PT.   -     LTG 4 Progress  New  -        Time Calculation    PT Goal Re-Cert Due Date  10/24/19  -       User Key  (r) = Recorded By, (t) = Taken By, (c) = Cosigned By    Initials Name Provider Type    SW Bernice Malhotra, PT Physical Therapist           Therapy Education  Given: HEP  Program: Reinforced, Progressed  How Provided: Verbal, Demonstration  Provided to: Patient  Level of Understanding: Teach back education performed, Verbalized, Demonstrated              Time Calculation:   Start Time: 1306  Stop Time: 1408  Time Calculation (min): 62 min  Therapy Charges for Today     Code Description Service Date Service Provider Modifiers Qty    89724053355 HC PT THER PROC EA 15 MIN 10/1/2019 Bernice Malhotra, PT GP 2    31790854434 HC PT MANUAL THERAPY EA 15 MIN 10/1/2019 Bernice Malhotra, PT GP 2                    Bernice Malhotra PT  10/1/2019

## 2019-10-03 ENCOUNTER — ROUTINE PRENATAL (OUTPATIENT)
Dept: OBSTETRICS AND GYNECOLOGY | Facility: CLINIC | Age: 24
End: 2019-10-03

## 2019-10-03 VITALS — SYSTOLIC BLOOD PRESSURE: 104 MMHG | BODY MASS INDEX: 28.23 KG/M2 | DIASTOLIC BLOOD PRESSURE: 70 MMHG | WEIGHT: 202.4 LBS

## 2019-10-03 DIAGNOSIS — O26.892 RH NEGATIVE STATUS DURING PREGNANCY IN SECOND TRIMESTER: ICD-10-CM

## 2019-10-03 DIAGNOSIS — M54.50 LOW BACK PAIN DURING PREGNANCY, SECOND TRIMESTER: ICD-10-CM

## 2019-10-03 DIAGNOSIS — Z36.89 ENCOUNTER FOR OTHER SPECIFIED ANTENATAL SCREENING: Primary | ICD-10-CM

## 2019-10-03 DIAGNOSIS — O26.892 LOW BACK PAIN DURING PREGNANCY, SECOND TRIMESTER: ICD-10-CM

## 2019-10-03 DIAGNOSIS — Z3A.23 23 WEEKS GESTATION OF PREGNANCY: ICD-10-CM

## 2019-10-03 DIAGNOSIS — Z67.91 RH NEGATIVE STATUS DURING PREGNANCY IN SECOND TRIMESTER: ICD-10-CM

## 2019-10-03 DIAGNOSIS — O21.0 HYPEREMESIS GRAVIDARUM: ICD-10-CM

## 2019-10-03 PROCEDURE — 0502F SUBSEQUENT PRENATAL CARE: CPT | Performed by: NURSE PRACTITIONER

## 2019-10-03 NOTE — PROGRESS NOTES
CC: Prenatal visit    Millie Quevedo is a 23 y.o.  at 23w5d.  Doing well.  No complaints. Denies contractions, LOF, or VB today. States she occasionally has spotting; states she has evaluated a few times on L&D. Reports good FM.  Hyperemesis gravidarum is well controlled with zofran pump. Physical therapy is helping with back pain.        /70   Wt 91.8 kg (202 lb 6.4 oz)   LMP 2019 (Exact Date)   BMI 28.23 kg/m²     Preliminary F/U OB Repeat US: Remaining views have a normal appearence. No suboptimal views. AFV: WNL. Anterior placenta.  EFW 616gm (1lb 6oz). CL not seen.   Fundal Height (cm): 25 cm  Fetal Heart Rate: 158 us     Problems (from 19 to present)     No problems associated with this episode.          A/P: Millie Quevedo is a 23 y.o.  at 23w5d.  Discussed glucola, CBC, antibody screening at next visit. Reviewed return precautions such as heavy bleeding and LOF.     - RTC in 4  weeks     Diagnosis Plan   1. Encounter for other specified  screening  Glucose, Post 50 Gm Glucola    CBC (No Diff)   2. 23 weeks gestation of pregnancy     3. Hyperemesis gravidarum  Zofran pump   4. Rh negative status during pregnancy in second trimester  Antibody Screen   5. Low back pain during pregnancy, second trimester       NISHI Cruz  10/3/2019  10:08 AM

## 2019-10-06 ENCOUNTER — HOSPITAL ENCOUNTER (OUTPATIENT)
Facility: HOSPITAL | Age: 24
Discharge: HOME | End: 2019-10-06
Attending: OBSTETRICS & GYNECOLOGY | Admitting: OBSTETRICS & GYNECOLOGY

## 2019-10-06 VITALS
RESPIRATION RATE: 20 BRPM | HEART RATE: 87 BPM | SYSTOLIC BLOOD PRESSURE: 118 MMHG | OXYGEN SATURATION: 96 % | TEMPERATURE: 97.7 F | DIASTOLIC BLOOD PRESSURE: 65 MMHG

## 2019-10-06 LAB
BACTERIA UR QL AUTO: ABNORMAL /HPF
BILIRUB UR QL STRIP: NEGATIVE
CANDIDA ALBICANS: NEGATIVE
CLARITY UR: ABNORMAL
COLOR UR: YELLOW
GARDNERELLA VAGINALIS: NEGATIVE
GLUCOSE UR STRIP-MCNC: NEGATIVE MG/DL
HGB UR QL STRIP.AUTO: NEGATIVE
HYALINE CASTS UR QL AUTO: ABNORMAL /LPF
KETONES UR QL STRIP: NEGATIVE
LEUKOCYTE ESTERASE UR QL STRIP.AUTO: ABNORMAL
NITRITE UR QL STRIP: NEGATIVE
PH UR STRIP.AUTO: 7 [PH] (ref 5–9)
PROT UR QL STRIP: NEGATIVE
RBC # UR: ABNORMAL /HPF
REF LAB TEST METHOD: ABNORMAL
SP GR UR STRIP: 1.02 (ref 1–1.03)
SQUAMOUS #/AREA URNS HPF: ABNORMAL /HPF
T VAGINALIS DNA VAG QL PROBE+SIG AMP: NEGATIVE
UROBILINOGEN UR QL STRIP: ABNORMAL
WBC UR QL AUTO: ABNORMAL /HPF

## 2019-10-06 PROCEDURE — 81001 URINALYSIS AUTO W/SCOPE: CPT | Performed by: OBSTETRICS & GYNECOLOGY

## 2019-10-06 PROCEDURE — 87660 TRICHOMONAS VAGIN DIR PROBE: CPT | Performed by: OBSTETRICS & GYNECOLOGY

## 2019-10-06 PROCEDURE — 87510 GARDNER VAG DNA DIR PROBE: CPT | Performed by: OBSTETRICS & GYNECOLOGY

## 2019-10-06 PROCEDURE — G0463 HOSPITAL OUTPT CLINIC VISIT: HCPCS

## 2019-10-06 PROCEDURE — 59025 FETAL NON-STRESS TEST: CPT | Performed by: OBSTETRICS & GYNECOLOGY

## 2019-10-06 PROCEDURE — 59020 FETAL CONTRACT STRESS TEST: CPT

## 2019-10-06 PROCEDURE — 87480 CANDIDA DNA DIR PROBE: CPT | Performed by: OBSTETRICS & GYNECOLOGY

## 2019-10-06 RX ORDER — ACETAMINOPHEN 500 MG
500 TABLET ORAL EVERY 6 HOURS PRN
COMMUNITY
End: 2020-07-24

## 2019-10-06 NOTE — NON STRESS TEST
Millie Quevedo, a  at 24w1d with an DELFINO of 2020, by Last Menstrual Period, was seen at [facility] DELIV[facility] for a nonstress test.    Chief Complaint   Patient presents with   • Vaginal Bleeding     pt reports having sharp lower abdominal pain that comes and goes, lower abdominal cramping and vaginal spotting that started this morning. pt report decreased fetal movement, denies leaking fluid, audible fetal movement noted       Patient Active Problem List   Diagnosis   • Rh negative status during pregnancy in second trimester   • Hyperemesis gravidarum   • Low back pain during pregnancy, second trimester       Start Time:1435  Stop Time:1455      Interpretation A  Nonstress Test Interpretation A: Reactive (10/06/19 1435 : Sarah Mederos, RN)  Comments A: reviewed with CALI Kate RN (10/06/19 1435 : Sarah Mederos, RN)

## 2019-10-14 DIAGNOSIS — Z03.79 ENCOUNTER FOR OTHER SUSPECTED MATERNAL AND FETAL CONDITIONS RULED OUT: ICD-10-CM

## 2019-10-23 ENCOUNTER — HOSPITAL ENCOUNTER (OUTPATIENT)
Facility: HOSPITAL | Age: 24
Discharge: HOME | End: 2019-10-23
Attending: FAMILY MEDICINE | Admitting: FAMILY MEDICINE

## 2019-10-23 VITALS — BODY MASS INDEX: 28.42 KG/M2 | HEIGHT: 71 IN | WEIGHT: 203 LBS

## 2019-10-23 LAB
ALBUMIN SERPL-MCNC: 3.9 G/DL (ref 3.5–5.2)
ALBUMIN/GLOB SERPL: 1.2 G/DL
ALP SERPL-CCNC: 80 U/L (ref 39–117)
ALT SERPL W P-5'-P-CCNC: 9 U/L (ref 1–33)
ANION GAP SERPL CALCULATED.3IONS-SCNC: 12 MMOL/L (ref 5–15)
AST SERPL-CCNC: 11 U/L (ref 1–32)
BACTERIA UR QL AUTO: ABNORMAL /HPF
BACTERIA UR QL AUTO: ABNORMAL /HPF
BILIRUB SERPL-MCNC: 0.3 MG/DL (ref 0.2–1.2)
BILIRUB UR QL STRIP: NEGATIVE
BILIRUB UR QL STRIP: NEGATIVE
BUN BLD-MCNC: 10 MG/DL (ref 6–20)
BUN/CREAT SERPL: 24.4 (ref 7–25)
CALCIUM SPEC-SCNC: 8.8 MG/DL (ref 8.6–10.5)
CHLORIDE SERPL-SCNC: 103 MMOL/L (ref 98–107)
CLARITY UR: ABNORMAL
CLARITY UR: ABNORMAL
CO2 SERPL-SCNC: 20 MMOL/L (ref 22–29)
COD CRY URNS QL: ABNORMAL /HPF
COLOR UR: ABNORMAL
COLOR UR: YELLOW
CREAT BLD-MCNC: 0.41 MG/DL (ref 0.57–1)
GFR SERPL CREATININE-BSD FRML MDRD: >150 ML/MIN/1.73
GLOBULIN UR ELPH-MCNC: 3.3 GM/DL
GLUCOSE BLD-MCNC: 88 MG/DL (ref 65–99)
GLUCOSE UR STRIP-MCNC: NEGATIVE MG/DL
GLUCOSE UR STRIP-MCNC: NEGATIVE MG/DL
HGB UR QL STRIP.AUTO: NEGATIVE
HGB UR QL STRIP.AUTO: NEGATIVE
HYALINE CASTS UR QL AUTO: ABNORMAL /LPF
HYALINE CASTS UR QL AUTO: ABNORMAL /LPF
KETONES UR QL STRIP: ABNORMAL
KETONES UR QL STRIP: ABNORMAL
LEUKOCYTE ESTERASE UR QL STRIP.AUTO: ABNORMAL
LEUKOCYTE ESTERASE UR QL STRIP.AUTO: ABNORMAL
NITRITE UR QL STRIP: NEGATIVE
NITRITE UR QL STRIP: NEGATIVE
PH UR STRIP.AUTO: 6 [PH] (ref 5–9)
PH UR STRIP.AUTO: 6 [PH] (ref 5–9)
POTASSIUM BLD-SCNC: 3.5 MMOL/L (ref 3.5–5.2)
PROT SERPL-MCNC: 7.2 G/DL (ref 6–8.5)
PROT UR QL STRIP: ABNORMAL
PROT UR QL STRIP: NEGATIVE
RBC # UR: ABNORMAL /HPF
RBC # UR: ABNORMAL /HPF
REF LAB TEST METHOD: ABNORMAL
REF LAB TEST METHOD: ABNORMAL
SODIUM BLD-SCNC: 135 MMOL/L (ref 136–145)
SP GR UR STRIP: 1.03 (ref 1–1.03)
SP GR UR STRIP: 1.03 (ref 1–1.03)
SQUAMOUS #/AREA URNS HPF: ABNORMAL /HPF
SQUAMOUS #/AREA URNS HPF: ABNORMAL /HPF
UROBILINOGEN UR QL STRIP: ABNORMAL
UROBILINOGEN UR QL STRIP: ABNORMAL
WBC UR QL AUTO: ABNORMAL /HPF
WBC UR QL AUTO: ABNORMAL /HPF

## 2019-10-23 PROCEDURE — 81001 URINALYSIS AUTO W/SCOPE: CPT | Performed by: FAMILY MEDICINE

## 2019-10-23 PROCEDURE — 99214 OFFICE O/P EST MOD 30 MIN: CPT | Performed by: FAMILY MEDICINE

## 2019-10-23 PROCEDURE — G0463 HOSPITAL OUTPT CLINIC VISIT: HCPCS

## 2019-10-23 PROCEDURE — 25010000002 ONDANSETRON PER 1 MG

## 2019-10-23 PROCEDURE — 96374 THER/PROPH/DIAG INJ IV PUSH: CPT

## 2019-10-23 PROCEDURE — 59025 FETAL NON-STRESS TEST: CPT | Performed by: FAMILY MEDICINE

## 2019-10-23 PROCEDURE — 25010000002 METOCLOPRAMIDE PER 10 MG

## 2019-10-23 PROCEDURE — 80053 COMPREHEN METABOLIC PANEL: CPT | Performed by: FAMILY MEDICINE

## 2019-10-23 PROCEDURE — 59025 FETAL NON-STRESS TEST: CPT

## 2019-10-23 RX ORDER — METOCLOPRAMIDE HYDROCHLORIDE 5 MG/ML
INJECTION INTRAMUSCULAR; INTRAVENOUS
Status: COMPLETED
Start: 2019-10-23 | End: 2019-10-23

## 2019-10-23 RX ORDER — SODIUM CHLORIDE 0.9 % (FLUSH) 0.9 %
10 SYRINGE (ML) INJECTION AS NEEDED
Status: DISCONTINUED | OUTPATIENT
Start: 2019-10-23 | End: 2019-10-23 | Stop reason: HOSPADM

## 2019-10-23 RX ORDER — ONDANSETRON 2 MG/ML
4 INJECTION INTRAMUSCULAR; INTRAVENOUS ONCE
Status: COMPLETED | OUTPATIENT
Start: 2019-10-23 | End: 2019-10-23

## 2019-10-23 RX ORDER — SODIUM CHLORIDE, SODIUM LACTATE, POTASSIUM CHLORIDE, CALCIUM CHLORIDE 600; 310; 30; 20 MG/100ML; MG/100ML; MG/100ML; MG/100ML
200 INJECTION, SOLUTION INTRAVENOUS CONTINUOUS
Status: DISCONTINUED | OUTPATIENT
Start: 2019-10-23 | End: 2019-10-23 | Stop reason: HOSPADM

## 2019-10-23 RX ORDER — SODIUM CHLORIDE, SODIUM LACTATE, POTASSIUM CHLORIDE, CALCIUM CHLORIDE 600; 310; 30; 20 MG/100ML; MG/100ML; MG/100ML; MG/100ML
INJECTION, SOLUTION INTRAVENOUS
Status: COMPLETED
Start: 2019-10-23 | End: 2019-10-23

## 2019-10-23 RX ORDER — SODIUM CHLORIDE 0.9 % (FLUSH) 0.9 %
10 SYRINGE (ML) INJECTION EVERY 12 HOURS SCHEDULED
Status: DISCONTINUED | OUTPATIENT
Start: 2019-10-23 | End: 2019-10-23 | Stop reason: HOSPADM

## 2019-10-23 RX ORDER — PROMETHAZINE HYDROCHLORIDE 25 MG/ML
12.5 INJECTION, SOLUTION INTRAMUSCULAR; INTRAVENOUS EVERY 6 HOURS PRN
Status: DISCONTINUED | OUTPATIENT
Start: 2019-10-23 | End: 2019-10-23 | Stop reason: HOSPADM

## 2019-10-23 RX ORDER — METOCLOPRAMIDE HYDROCHLORIDE 5 MG/ML
10 INJECTION INTRAMUSCULAR; INTRAVENOUS ONCE
Status: COMPLETED | OUTPATIENT
Start: 2019-10-23 | End: 2019-10-23

## 2019-10-23 RX ORDER — ONDANSETRON 2 MG/ML
INJECTION INTRAMUSCULAR; INTRAVENOUS
Status: COMPLETED
Start: 2019-10-23 | End: 2019-10-23

## 2019-10-23 RX ADMIN — SODIUM CHLORIDE, POTASSIUM CHLORIDE, SODIUM LACTATE AND CALCIUM CHLORIDE 1000 ML: 600; 310; 30; 20 INJECTION, SOLUTION INTRAVENOUS at 01:39

## 2019-10-23 RX ADMIN — METOCLOPRAMIDE 10 MG: 5 INJECTION, SOLUTION INTRAMUSCULAR; INTRAVENOUS at 02:11

## 2019-10-23 RX ADMIN — ONDANSETRON 4 MG: 2 INJECTION INTRAMUSCULAR; INTRAVENOUS at 01:44

## 2019-10-23 RX ADMIN — METOCLOPRAMIDE HYDROCHLORIDE 10 MG: 5 INJECTION INTRAMUSCULAR; INTRAVENOUS at 02:11

## 2019-10-23 RX ADMIN — SODIUM CHLORIDE, POTASSIUM CHLORIDE, SODIUM LACTATE AND CALCIUM CHLORIDE 200 ML/HR: 600; 310; 30; 20 INJECTION, SOLUTION INTRAVENOUS at 02:11

## 2019-10-23 RX ADMIN — SODIUM CHLORIDE, SODIUM LACTATE, POTASSIUM CHLORIDE, CALCIUM CHLORIDE 200 ML/HR: 600; 310; 30; 20 INJECTION, SOLUTION INTRAVENOUS at 02:11

## 2019-10-23 NOTE — DISCHARGE INSTRUCTIONS
Return to labor and delivery for contractions, if your water breaks, vaginal bleeding, decreased fetal movement.  Keep next scheduled appt and call [phone] for any concerns or problems.      Continue to use Zofran Pump, see attached sheet for further instructions

## 2019-10-23 NOTE — PROGRESS NOTES
[facility] OB Triage Progress Note  ?     S: Pt is a 23 y.o.  at 26w4d with Estimated Date of Delivery: 20 presents to triage c/o Nausea and vomiting x 2 days.  Patient has a zofran pump for hyperemesis but states her stomach gets sensitive when she changes the pump site often so she occasionally doesn't wear it.  Reports when she called for refills of her zofran bolus she reported increased nausea and was told she may need to come to the hospital for fluids.  Reports 4-6 episodes of emesis today.  States zofran and phenergan have not helped, she has not taken phenergan since yesterday.  Reports good fetal movement, no contractions, no LOF, no VB.      OB History      Para Term  AB Living    1 0 0 0 0 0    SAB TAB Ectopic Molar Multiple Live Births    0 0 0 0 0 0        Prior to Admission medications    Medication Sig Start Date End Date Taking? Authorizing Provider   acetaminophen (TYLENOL) 500 MG tablet Take 500 mg by mouth Every 6 (Six) Hours As Needed for Mild Pain  (for headaches).   Yes ProviderAnushka MD   Ondansetron HCl (ZOFRAN IV) Infuse  into a venous catheter.   Yes ProviderAnushka MD   Prenatal Vit-Fe Fumarate-FA (PRENATAL VITAMIN PLUS LOW IRON) 27-1 MG tablet TAKE 1 TABLET BY MOUTH EVERY DAY 19  Yes Alicia Mckeon APRN   promethazine (PHENERGAN) 25 MG tablet Take 0.5 tablets by mouth Every 8 (Eight) Hours As Needed for Nausea or Vomiting. 19  Yes Brianna Jauregui APRN   albuterol (PROVENTIL HFA;VENTOLIN HFA) 108 (90 BASE) MCG/ACT inhaler Inhale. As needed    ProviderAnushka MD   Prenatal Vit-Fe Fumarate-FA (PRENATAL VITAMIN) 27-0.8 MG tablet Take 1 tablet by mouth Daily. 19   Alicia Mckeon APRN     Past Medical History:   Diagnosis Date   • Abnormal Pap smear of cervix    • Acute maxillary sinusitis    • Acute otitis externa    • Acute pharyngitis    • Acute tonsillitis    • Allergic asthma     IgE-mediated  allergic asthma     • Allergic rhinitis    • Allergic rhinitis due to pollen    • Anxiety    • Asthma    • Chondromalacia of patella     left knee    • Common cold    • Cough    • Diarrhea    • Disorder of gallbladder     Probable biliary dyskinesia    • Epigastric pain    • Foot pain    • Gastroenteritis    • Generalized abdominal pain    • Headache    • History of colonoscopy 03/27/2013    Colon endoscopy 54472 (1)  -  Internal & external hemorrhoids found.   • History of esophagogastroduodenoscopy (EGD) 03/27/2013    w/ tube 47809 (1)  -  Normal esophagus. Gastritis in stomach. Biopsy taken. Normal duodenum. Biospy taken   • History of marijuana use    • HPV (human papilloma virus) infection    • IBS (irritable bowel syndrome)    • Influenza    • Irregular periods    • Irritable bowel syndrome    • Migraine    • Nausea    • Nausea and vomiting    • Osgood-Schlatter's disease    • Pain in throat    • Patellar tendonitis    • Right upper quadrant pain    • Streptococcal sore throat    • Temporomandibular joint disorder     WISDOM TEETH    • Upper respiratory infection    • Urgency of urination    • Urinary tract infectious disease    • Varicella    • Viral gastroenteritis    • Vulvovaginitis      Past Surgical History:   Procedure Laterality Date   • CHOLECYSTECTOMY  06/06/2013    Laparoscopic  -  laparoscopic cholecystectomy with intraoperative cholangiogram. Cholecystitis.   • INJECTION OF MEDICATION  01/08/2013    Toradol (1)   -  FLORY Sotomayor   • LAPAROSCOPIC CHOLECYSTECTOMY     • WISDOM TOOTH EXTRACTION        reports that she has never smoked. She has never used smokeless tobacco. She reports that she uses drugs. Drug: Marijuana. She reports that she does not drink alcohol.  Family History   Adopted: Yes   Problem Relation Age of Onset   • Cancer Other    • Diabetes Other    • Heart disease Other    • Hypertension Other    • Stroke Other    • Lung disease Other    • Cholelithiasis Other    • Ulcers Other    •  "Other Other         Colon problems   • Ovarian cysts Mother    • Bipolar disorder Mother    • Irritable bowel syndrome Mother    • Ovarian cancer Mother    • No Known Problems Sister    • Emphysema Maternal Grandmother    • Heart attack Maternal Grandfather    • No Known Problems Sister    • No Known Problems Sister      Allergies   Allergen Reactions   • Aspirin Shortness Of Breath     TIGHT CHEST   • Codeine Shortness Of Breath, Itching and Rash   • Naproxen Shortness Of Breath     Tightness of chest   • Erythromycin Base Rash   • Latex Rash     Rash      ROS: No vaginal bleeding, leakage of fluid. Patient reports good fetal movement. All other ROS reviewed and reported as negative.  ?  O: Ht 180.3 cm (71\")   Wt 92.1 kg (203 lb)   LMP 2019 (Exact Date)   BMI 28.31 kg/m²    Gen: alert, appears stated age and cooperative  Abd: soft, gravid; FHT present  Ext: no cyanosis  Psych: A & Ox3; judgement and insight intact; memory both long term and short term intact. Mood and affect are appropriate.    Fetal Heart Tones: Baseline: 145 bpm, Variability: moderate {> 6 bpm), Accelerations: Reactive and Decelerations: Absent  Uterine Activity: mild irritability      10/23/2019 0051  10/23/2019 0104  Urinalysis With Microscopic If Indicated (No Culture) - Urine, Clean Catch [[id number]]   (Abnormal)   Urine, Clean Catch     Final result  Color, UA Yellow   Appearance, UA Turbid Abnormal    pH, UA 6.0   Specific Gravity, UA 1.027   Glucose Negative   Ketones, UA 80 mg/dL (3+) Abnormal    Bilirubin, UA Negative   Blood, UA Negative   Protein, UA Trace Abnormal    Leukocytes, UA Moderate (2+) Abnormal    Nitrite, UA Negative   Urobilinogen, UA 0.2 E.U./dL          A/P: 23 y.o.  26w4d with dehydration 2/2 hyperemesis gravidarum  - obtain CMP  - start IVFs - 1 liter LR bolus followed by 200cc/hr  - Zofran 4mg IV x1, Phenergan 12.5mg IV x1, Reglan 10mg IV x 1  - allow sips of clears  - NST reactive for gestational " age    Signature  Neda Brasher MD  [facility]      This document has been electronically signed by Neda Brasher MD on October 23, 2019 1:32 AM        Patient feels much better after fluids and antiemetics.  Appropriate for discharge home.  Follow up with Dr. Colon as scheduled.  Reinforced consistent use of Zofran pump.    Signature    This document has been electronically signed by Neda Brasher MD on October 23, 2019 8:23 AM

## 2019-10-23 NOTE — NON STRESS TEST
Millie Quevedo, a  at 26w4d with an DELFINO of 2020, by Last Menstrual Period, was seen at [facility] DELIVERY for a nonstress test.    Chief Complaint   Patient presents with   • Hyperemesis Gravidarum     Pt to L&D with c/o throwing up starting yesterday and feeling nauseated. Pt reports + fetal movement. Pt denies any bleeding or leaking fluid.        Patient Active Problem List   Diagnosis   • Rh negative status during pregnancy in second trimester   • Hyperemesis gravidarum   • Low back pain during pregnancy, second trimester       Start Time: 32  Stop Time: 145    Interpretation A  Nonstress Test Interpretation A: Reactive (10/23/19 0145 : Faby Zambrano, RN)  Comments A: reviewed with MARIA ESTHER Paul RN (10/23/19 0145 : Faby Zambrano, RN)      UA sent (3+ ketones), CMP, IV bolus/maintenance, anti-nausea meds

## 2019-10-24 ENCOUNTER — ROUTINE PRENATAL (OUTPATIENT)
Dept: OBSTETRICS AND GYNECOLOGY | Facility: CLINIC | Age: 24
End: 2019-10-24

## 2019-10-24 VITALS — WEIGHT: 201 LBS | BODY MASS INDEX: 28.03 KG/M2

## 2019-10-24 DIAGNOSIS — Z34.02 ENCOUNTER FOR SUPERVISION OF NORMAL FIRST PREGNANCY IN SECOND TRIMESTER: Primary | ICD-10-CM

## 2019-10-24 NOTE — PROGRESS NOTES
Patient is here today for group prenatal education. Approximately 2 hours was spent with the patient and her partner. She watched the birth video about labor. We discussed labor, timing contractions, comfort measures to help with labor pains, when to come to the hospital,  hospital protocol for patients in labor, episiotomy, vacuum extraction, positions for labor, etc.  We also discussed fetal movement kick counts. A pamphlet is given regarding going the full 40 weeks. It also talks about  labor and signs of  labor. She went on an L&D tour today. All questions are answered today. She wanted a breastpump form to be faxed to get her breastpump ordered. I faxed the form for her. She has an upcoming appointment with Dr. Colon.

## 2019-10-31 ENCOUNTER — ROUTINE PRENATAL (OUTPATIENT)
Dept: OBSTETRICS AND GYNECOLOGY | Facility: CLINIC | Age: 24
End: 2019-10-31

## 2019-10-31 ENCOUNTER — LAB (OUTPATIENT)
Dept: LAB | Facility: HOSPITAL | Age: 24
End: 2019-10-31

## 2019-10-31 VITALS — SYSTOLIC BLOOD PRESSURE: 99 MMHG | BODY MASS INDEX: 28.59 KG/M2 | DIASTOLIC BLOOD PRESSURE: 63 MMHG | WEIGHT: 205 LBS

## 2019-10-31 DIAGNOSIS — O26.892 LOW BACK PAIN DURING PREGNANCY, SECOND TRIMESTER: ICD-10-CM

## 2019-10-31 DIAGNOSIS — M54.50 LOW BACK PAIN DURING PREGNANCY, SECOND TRIMESTER: ICD-10-CM

## 2019-10-31 DIAGNOSIS — Z36.89 ENCOUNTER FOR OTHER SPECIFIED ANTENATAL SCREENING: ICD-10-CM

## 2019-10-31 DIAGNOSIS — O26.892 RH NEGATIVE STATUS DURING PREGNANCY IN SECOND TRIMESTER: Primary | ICD-10-CM

## 2019-10-31 DIAGNOSIS — Z67.91 RH NEGATIVE STATUS DURING PREGNANCY IN SECOND TRIMESTER: Primary | ICD-10-CM

## 2019-10-31 DIAGNOSIS — O26.892 RH NEGATIVE STATUS DURING PREGNANCY IN SECOND TRIMESTER: ICD-10-CM

## 2019-10-31 DIAGNOSIS — O21.0 HYPEREMESIS GRAVIDARUM: ICD-10-CM

## 2019-10-31 DIAGNOSIS — Z67.91 RH NEGATIVE STATUS DURING PREGNANCY IN SECOND TRIMESTER: ICD-10-CM

## 2019-10-31 DIAGNOSIS — Z3A.27 27 WEEKS GESTATION OF PREGNANCY: ICD-10-CM

## 2019-10-31 LAB
BLD GP AB SCN SERPL QL: NEGATIVE
DEPRECATED RDW RBC AUTO: 42.3 FL (ref 37–54)
ERYTHROCYTE [DISTWIDTH] IN BLOOD BY AUTOMATED COUNT: 12.9 % (ref 12.3–15.4)
GLUCOSE 1H P 100 G GLC PO SERPL-MCNC: 117 MG/DL (ref 60–140)
HCT VFR BLD AUTO: 34.4 % (ref 34–46.6)
HGB BLD-MCNC: 11.8 G/DL (ref 12–15.9)
MCH RBC QN AUTO: 31 PG (ref 26.6–33)
MCHC RBC AUTO-ENTMCNC: 34.3 G/DL (ref 31.5–35.7)
MCV RBC AUTO: 90.3 FL (ref 79–97)
PLATELET # BLD AUTO: 230 10*3/MM3 (ref 140–450)
PMV BLD AUTO: 10.6 FL (ref 6–12)
RBC # BLD AUTO: 3.81 10*6/MM3 (ref 3.77–5.28)
WBC NRBC COR # BLD: 11.52 10*3/MM3 (ref 3.4–10.8)

## 2019-10-31 PROCEDURE — 82950 GLUCOSE TEST: CPT

## 2019-10-31 PROCEDURE — 86850 RBC ANTIBODY SCREEN: CPT

## 2019-10-31 PROCEDURE — 85027 COMPLETE CBC AUTOMATED: CPT

## 2019-10-31 PROCEDURE — 0502F SUBSEQUENT PRENATAL CARE: CPT | Performed by: OBSTETRICS & GYNECOLOGY

## 2019-10-31 PROCEDURE — 36415 COLL VENOUS BLD VENIPUNCTURE: CPT

## 2019-10-31 NOTE — PROGRESS NOTES
CC: Prenatal visit    Millie Quevedo is a 23 y.o.  at 27w5d.  Doing well.  No complaints.  Denies contractions, LOF, or VB.  Reports good FM.  Nausea and vomiting is still controlled with Zofran pump does have good days and bad days, but has gained 4 pounds.  Discussed RhoGam with patient and recommend shot today.  Also discussed Tdap and recommended shot at next visit.  Discussed flu shot with patient and she declines flu shot at this time.  Vision is currently doing Glucola test will have results weekly tomorrow.    BP 99/63   Wt 93 kg (205 lb)   LMP 2019 (Exact Date)   BMI 28.59 kg/m²   Fetal heart rate: 140 bpm  Height: 27 cm           Problems (from 19 to present)     No problems associated with this episode.          A/P: Millie Quevedo is a 23 y.o.  at 27w5d.  Appropriately progressing pregnancy complicated by hyperemesis gravidarum currently well controlled with weight gain.  RhoGam today for Rh- status.  Return to see me in 2 weeks.  Will give Tdap at the next visit.  Declines flu shot.  Fetal kick count  labor precautions given.  28-week testing done today.  Will likely recommend growth scan at 32 weeks secondary to hyperemesis.  - RTC in 2 weeks     Diagnosis Plan   1. Rh negative status during pregnancy in second trimester     2. Hyperemesis gravidarum     3. Low back pain during pregnancy, second trimester     4. 27 weeks gestation of pregnancy       Tyshawn Colon DO  10/31/2019  9:26 AM

## 2019-11-07 DIAGNOSIS — O21.9 NAUSEA AND VOMITING DURING PREGNANCY PRIOR TO 22 WEEKS GESTATION: ICD-10-CM

## 2019-11-07 RX ORDER — PROMETHAZINE HYDROCHLORIDE 25 MG/1
TABLET ORAL
Qty: 30 TABLET | Refills: 1 | OUTPATIENT
Start: 2019-11-07

## 2019-11-14 ENCOUNTER — ROUTINE PRENATAL (OUTPATIENT)
Dept: OBSTETRICS AND GYNECOLOGY | Facility: CLINIC | Age: 24
End: 2019-11-14

## 2019-11-14 VITALS — BODY MASS INDEX: 29.01 KG/M2 | SYSTOLIC BLOOD PRESSURE: 104 MMHG | DIASTOLIC BLOOD PRESSURE: 63 MMHG | WEIGHT: 208 LBS

## 2019-11-14 DIAGNOSIS — Z3A.29 29 WEEKS GESTATION OF PREGNANCY: ICD-10-CM

## 2019-11-14 DIAGNOSIS — O26.892 RH NEGATIVE STATUS DURING PREGNANCY IN SECOND TRIMESTER: ICD-10-CM

## 2019-11-14 DIAGNOSIS — O21.0 HYPEREMESIS GRAVIDARUM: ICD-10-CM

## 2019-11-14 DIAGNOSIS — K21.9 GASTROESOPHAGEAL REFLUX DISEASE, ESOPHAGITIS PRESENCE NOT SPECIFIED: Primary | ICD-10-CM

## 2019-11-14 DIAGNOSIS — Z67.91 RH NEGATIVE STATUS DURING PREGNANCY IN SECOND TRIMESTER: ICD-10-CM

## 2019-11-14 DIAGNOSIS — O21.9 NAUSEA AND VOMITING DURING PREGNANCY PRIOR TO 22 WEEKS GESTATION: ICD-10-CM

## 2019-11-14 PROCEDURE — 90715 TDAP VACCINE 7 YRS/> IM: CPT | Performed by: OBSTETRICS & GYNECOLOGY

## 2019-11-14 PROCEDURE — 90471 IMMUNIZATION ADMIN: CPT | Performed by: OBSTETRICS & GYNECOLOGY

## 2019-11-14 PROCEDURE — 0502F SUBSEQUENT PRENATAL CARE: CPT | Performed by: OBSTETRICS & GYNECOLOGY

## 2019-11-14 RX ORDER — PROMETHAZINE HYDROCHLORIDE 25 MG/1
12.5 TABLET ORAL EVERY 8 HOURS PRN
Qty: 30 TABLET | Refills: 1 | Status: SHIPPED | OUTPATIENT
Start: 2019-11-14 | End: 2020-01-03 | Stop reason: SDUPTHER

## 2019-11-14 RX ORDER — OMEPRAZOLE 20 MG/1
20 TABLET, DELAYED RELEASE ORAL DAILY
Qty: 30 TABLET | Refills: 2 | Status: SHIPPED | OUTPATIENT
Start: 2019-11-14 | End: 2020-02-05

## 2019-11-14 NOTE — PROGRESS NOTES
CC: Prenatal visit    Millie Quevedo is a 24 y.o.  at 29w5d.  Doing well.  No complaints.  Denies contractions, LOF, or VB.  Reports good FM.  Nausea is a little works this week but still tolerable.  Patient has run out of Phenergan and needs a refill.  Patient does desire to get Tdap today.  Again declines flu shot.  Patient is having severe heartburn plan to start her on Prilosec.    /63   Wt 94.3 kg (208 lb)   LMP 2019 (Exact Date)   BMI 29.01 kg/m²   Fetal heart rate: 140 bpm  Fundal height: 29 cm           Problems (from 19 to present)     No problems associated with this episode.          A/P: Millie Quevedo is a 24 y.o.  at 29w5d.  Doing well with pregnancy complicated by hyperemesis gravidarum patient has continued to gain weight and seems to be well controlled with Zofran pump.  Plan to do Tdap today.  Patient to return to see me in 2 weeks, sooner as needed.  Fetal kick count and  labor precautions given.  Reviewed Glucola which was normal.  Prilosec for heartburn.  Phenergan refilled.  - RTC in 2 weeks     Diagnosis Plan   1. Gastroesophageal reflux disease, esophagitis presence not specified  omeprazole OTC (PrilOSEC OTC) 20 MG EC tablet   2. Nausea and vomiting during pregnancy prior to 22 weeks gestation  promethazine (PHENERGAN) 25 MG tablet   3. Rh negative status during pregnancy in second trimester     4. Hyperemesis gravidarum     5. 29 weeks gestation of pregnancy       Tyshawn Colon DO  2019  1:43 PM

## 2019-11-21 ENCOUNTER — ROUTINE PRENATAL (OUTPATIENT)
Dept: OBSTETRICS AND GYNECOLOGY | Facility: CLINIC | Age: 24
End: 2019-11-21

## 2019-11-21 VITALS — DIASTOLIC BLOOD PRESSURE: 68 MMHG | WEIGHT: 210.2 LBS | SYSTOLIC BLOOD PRESSURE: 110 MMHG | BODY MASS INDEX: 29.32 KG/M2

## 2019-11-21 DIAGNOSIS — Z34.03 ENCOUNTER FOR SUPERVISION OF NORMAL FIRST PREGNANCY IN THIRD TRIMESTER: Primary | ICD-10-CM

## 2019-11-21 NOTE — PROGRESS NOTES
90 minutes were spent during group prenatal education discussing Epidurals and IV pain medicine use. The “Choices in Childbirth” video was watched today. The video discusses risks and benefits of epidurals. We had discussed labor and delivery last session. More questions were asked regarding labor, delivery, etc.  All questions were answered today.

## 2019-11-26 ENCOUNTER — HOSPITAL ENCOUNTER (OUTPATIENT)
Facility: HOSPITAL | Age: 24
Discharge: HOME | End: 2019-11-26
Attending: OBSTETRICS & GYNECOLOGY | Admitting: OBSTETRICS & GYNECOLOGY

## 2019-11-26 VITALS
HEIGHT: 71 IN | RESPIRATION RATE: 20 BRPM | TEMPERATURE: 97.7 F | SYSTOLIC BLOOD PRESSURE: 120 MMHG | WEIGHT: 210 LBS | BODY MASS INDEX: 29.4 KG/M2 | DIASTOLIC BLOOD PRESSURE: 73 MMHG | HEART RATE: 92 BPM | OXYGEN SATURATION: 99 %

## 2019-11-26 LAB
ALBUMIN SERPL-MCNC: 3.8 G/DL (ref 3.5–5.2)
ALBUMIN/GLOB SERPL: 1 G/DL
ALP SERPL-CCNC: 108 U/L (ref 39–117)
ALT SERPL W P-5'-P-CCNC: 11 U/L (ref 1–33)
ANION GAP SERPL CALCULATED.3IONS-SCNC: 17 MMOL/L (ref 5–15)
AST SERPL-CCNC: 15 U/L (ref 1–32)
BACTERIA UR QL AUTO: ABNORMAL /HPF
BILIRUB SERPL-MCNC: 0.4 MG/DL (ref 0.2–1.2)
BILIRUB UR QL STRIP: NEGATIVE
BLOODY SPECIMEN?: NO
BUN BLD-MCNC: 7 MG/DL (ref 6–20)
BUN/CREAT SERPL: 14.9 (ref 7–25)
CALCIUM SPEC-SCNC: 9 MG/DL (ref 8.6–10.5)
CHLORIDE SERPL-SCNC: 100 MMOL/L (ref 98–107)
CLARITY UR: ABNORMAL
CO2 SERPL-SCNC: 18 MMOL/L (ref 22–29)
COLOR UR: ABNORMAL
CREAT BLD-MCNC: 0.47 MG/DL (ref 0.57–1)
DEPRECATED RDW RBC AUTO: 42.3 FL (ref 37–54)
ERYTHROCYTE [DISTWIDTH] IN BLOOD BY AUTOMATED COUNT: 13.2 % (ref 12.3–15.4)
FIBRONECTIN FETAL VAG QL: NEGATIVE
GFR SERPL CREATININE-BSD FRML MDRD: >150 ML/MIN/1.73
GLOBULIN UR ELPH-MCNC: 3.9 GM/DL
GLUCOSE BLD-MCNC: 88 MG/DL (ref 65–99)
GLUCOSE UR STRIP-MCNC: NEGATIVE MG/DL
HCT VFR BLD AUTO: 36.9 % (ref 34–46.6)
HGB BLD-MCNC: 12.5 G/DL (ref 12–15.9)
HGB UR QL STRIP.AUTO: NEGATIVE
HYALINE CASTS UR QL AUTO: ABNORMAL /LPF
KETONES UR QL STRIP: ABNORMAL
LEUKOCYTE ESTERASE UR QL STRIP.AUTO: ABNORMAL
MCH RBC QN AUTO: 29.8 PG (ref 26.6–33)
MCHC RBC AUTO-ENTMCNC: 33.9 G/DL (ref 31.5–35.7)
MCV RBC AUTO: 88.1 FL (ref 79–97)
NITRITE UR QL STRIP: NEGATIVE
PH UR STRIP.AUTO: 6.5 [PH] (ref 5–9)
PLATELET # BLD AUTO: 272 10*3/MM3 (ref 140–450)
PMV BLD AUTO: 10 FL (ref 6–12)
POTASSIUM BLD-SCNC: 3.6 MMOL/L (ref 3.5–5.2)
PROT SERPL-MCNC: 7.7 G/DL (ref 6–8.5)
PROT UR QL STRIP: ABNORMAL
RBC # BLD AUTO: 4.19 10*6/MM3 (ref 3.77–5.28)
RBC # UR: ABNORMAL /HPF
REF LAB TEST METHOD: ABNORMAL
SODIUM BLD-SCNC: 135 MMOL/L (ref 136–145)
SP GR UR STRIP: 1.02 (ref 1–1.03)
SQUAMOUS #/AREA URNS HPF: ABNORMAL /HPF
UROBILINOGEN UR QL STRIP: ABNORMAL
WBC NRBC COR # BLD: 13.74 10*3/MM3 (ref 3.4–10.8)
WBC UR QL AUTO: ABNORMAL /HPF
WHOLE BLOOD HOLD SPECIMEN: NORMAL

## 2019-11-26 PROCEDURE — G0463 HOSPITAL OUTPT CLINIC VISIT: HCPCS

## 2019-11-26 PROCEDURE — 59025 FETAL NON-STRESS TEST: CPT | Performed by: OBSTETRICS & GYNECOLOGY

## 2019-11-26 PROCEDURE — 85027 COMPLETE CBC AUTOMATED: CPT | Performed by: OBSTETRICS & GYNECOLOGY

## 2019-11-26 PROCEDURE — 80053 COMPREHEN METABOLIC PANEL: CPT | Performed by: OBSTETRICS & GYNECOLOGY

## 2019-11-26 PROCEDURE — 36415 COLL VENOUS BLD VENIPUNCTURE: CPT | Performed by: OBSTETRICS & GYNECOLOGY

## 2019-11-26 PROCEDURE — 81001 URINALYSIS AUTO W/SCOPE: CPT | Performed by: OBSTETRICS & GYNECOLOGY

## 2019-11-26 PROCEDURE — 82731 ASSAY OF FETAL FIBRONECTIN: CPT | Performed by: OBSTETRICS & GYNECOLOGY

## 2019-11-26 PROCEDURE — 59025 FETAL NON-STRESS TEST: CPT

## 2019-11-26 RX ORDER — SODIUM CHLORIDE, SODIUM LACTATE, POTASSIUM CHLORIDE, CALCIUM CHLORIDE 600; 310; 30; 20 MG/100ML; MG/100ML; MG/100ML; MG/100ML
INJECTION, SOLUTION INTRAVENOUS
Status: DISCONTINUED
Start: 2019-11-26 | End: 2019-11-27 | Stop reason: HOSPADM

## 2019-11-26 RX ORDER — SODIUM CHLORIDE 0.9 % (FLUSH) 0.9 %
10 SYRINGE (ML) INJECTION EVERY 12 HOURS SCHEDULED
Status: DISCONTINUED | OUTPATIENT
Start: 2019-11-26 | End: 2019-11-27 | Stop reason: HOSPADM

## 2019-11-26 RX ORDER — SODIUM CHLORIDE 0.9 % (FLUSH) 0.9 %
10 SYRINGE (ML) INJECTION AS NEEDED
Status: DISCONTINUED | OUTPATIENT
Start: 2019-11-26 | End: 2019-11-27 | Stop reason: HOSPADM

## 2019-11-26 RX ORDER — NITROFURANTOIN 25; 75 MG/1; MG/1
100 CAPSULE ORAL 2 TIMES DAILY
Qty: 14 CAPSULE | Refills: 0 | Status: SHIPPED | OUTPATIENT
Start: 2019-11-26 | End: 2019-12-05 | Stop reason: HOSPADM

## 2019-11-26 RX ADMIN — SODIUM CHLORIDE, POTASSIUM CHLORIDE, SODIUM LACTATE AND CALCIUM CHLORIDE 125 ML/HR: 600; 310; 30; 20 INJECTION, SOLUTION INTRAVENOUS at 23:00

## 2019-11-26 RX ADMIN — SODIUM CHLORIDE, POTASSIUM CHLORIDE, SODIUM LACTATE AND CALCIUM CHLORIDE 1000 ML: 600; 310; 30; 20 INJECTION, SOLUTION INTRAVENOUS at 22:30

## 2019-11-27 ENCOUNTER — ROUTINE PRENATAL (OUTPATIENT)
Dept: OBSTETRICS AND GYNECOLOGY | Facility: CLINIC | Age: 24
End: 2019-11-27

## 2019-11-27 VITALS — BODY MASS INDEX: 29.46 KG/M2 | WEIGHT: 211.2 LBS | SYSTOLIC BLOOD PRESSURE: 90 MMHG | DIASTOLIC BLOOD PRESSURE: 60 MMHG

## 2019-11-27 DIAGNOSIS — Z3A.31 31 WEEKS GESTATION OF PREGNANCY: Primary | ICD-10-CM

## 2019-11-27 DIAGNOSIS — Z67.91 RH NEGATIVE STATUS DURING PREGNANCY IN SECOND TRIMESTER: ICD-10-CM

## 2019-11-27 DIAGNOSIS — O26.892 RH NEGATIVE STATUS DURING PREGNANCY IN SECOND TRIMESTER: ICD-10-CM

## 2019-11-27 DIAGNOSIS — O21.0 HYPEREMESIS GRAVIDARUM: ICD-10-CM

## 2019-11-27 PROCEDURE — 0502F SUBSEQUENT PRENATAL CARE: CPT | Performed by: OBSTETRICS & GYNECOLOGY

## 2019-11-27 RX ORDER — SODIUM CHLORIDE, SODIUM LACTATE, POTASSIUM CHLORIDE, CALCIUM CHLORIDE 600; 310; 30; 20 MG/100ML; MG/100ML; MG/100ML; MG/100ML
125 INJECTION, SOLUTION INTRAVENOUS CONTINUOUS
Status: DISCONTINUED | OUTPATIENT
Start: 2019-11-26 | End: 2019-11-27 | Stop reason: HOSPADM

## 2019-11-27 NOTE — NON STRESS TEST
Millie Quevedo, a  at 31w4d with an DELFINO of 2020, by Last Menstrual Period, was seen at [facility]OR DELIVERY for a nonstress test.    Chief Complaint   Patient presents with   • Abdominal Pain     reports having bad cramping, diarrhea, and spotting when wipes.       Patient Active Problem List   Diagnosis   • Rh negative status during pregnancy in second trimester   • Hyperemesis gravidarum   • Low back pain during pregnancy, second trimester       Start Time:  Stop Time:     Interpretation A  Nonstress Test Interpretation A: Reactive (19 : Shantal Arthur, RN)  Comments A: reviewed with Dr Colon (19 : Shantal Arthur, RN)  Fluid bolus given, cmp and cbc collected.  FFN negative, treating pt for UTI.  Prescription sent into pharmacy.

## 2019-11-27 NOTE — DISCHARGE INSTR - ACTIVITY
Return if vaginal bleeding, think water has broken, decreased fetal movements, or strong regular contractions. Drink plenty of water and keep all scheduled Dr appointments.  prescription in the morning at pharmacy.

## 2019-12-02 ENCOUNTER — ROUTINE PRENATAL (OUTPATIENT)
Dept: OBSTETRICS AND GYNECOLOGY | Facility: CLINIC | Age: 24
End: 2019-12-02

## 2019-12-02 ENCOUNTER — HOSPITAL ENCOUNTER (OUTPATIENT)
Facility: HOSPITAL | Age: 24
Discharge: HOME | End: 2019-12-02
Attending: OBSTETRICS & GYNECOLOGY | Admitting: OBSTETRICS & GYNECOLOGY

## 2019-12-02 VITALS — SYSTOLIC BLOOD PRESSURE: 140 MMHG | WEIGHT: 210 LBS | DIASTOLIC BLOOD PRESSURE: 92 MMHG | BODY MASS INDEX: 29.29 KG/M2

## 2019-12-02 VITALS
DIASTOLIC BLOOD PRESSURE: 65 MMHG | TEMPERATURE: 98.1 F | HEIGHT: 71 IN | WEIGHT: 210 LBS | SYSTOLIC BLOOD PRESSURE: 129 MMHG | BODY MASS INDEX: 29.4 KG/M2 | OXYGEN SATURATION: 97 % | HEART RATE: 83 BPM | RESPIRATION RATE: 18 BRPM

## 2019-12-02 DIAGNOSIS — O47.00 PRETERM UTERINE CONTRACTIONS, ANTEPARTUM: ICD-10-CM

## 2019-12-02 DIAGNOSIS — R51.9 ACUTE INTRACTABLE HEADACHE, UNSPECIFIED HEADACHE TYPE: ICD-10-CM

## 2019-12-02 DIAGNOSIS — H53.9 CHANGES IN VISION: ICD-10-CM

## 2019-12-02 DIAGNOSIS — Z3A.32 32 WEEKS GESTATION OF PREGNANCY: ICD-10-CM

## 2019-12-02 DIAGNOSIS — R03.0 ELEVATED BLOOD PRESSURE READING: Primary | ICD-10-CM

## 2019-12-02 PROBLEM — N89.8 VAGINAL DISCHARGE: Status: ACTIVE | Noted: 2019-12-02

## 2019-12-02 PROBLEM — G44.52 NEW DAILY PERSISTENT HEADACHE: Status: ACTIVE | Noted: 2019-12-02

## 2019-12-02 LAB
ALBUMIN SERPL-MCNC: 3.8 G/DL (ref 3.5–5.2)
ALBUMIN/GLOB SERPL: 1 G/DL
ALP SERPL-CCNC: 112 U/L (ref 39–117)
ALT SERPL W P-5'-P-CCNC: 11 U/L (ref 1–33)
ANION GAP SERPL CALCULATED.3IONS-SCNC: 16 MMOL/L (ref 5–15)
AST SERPL-CCNC: 13 U/L (ref 1–32)
BACTERIA UR QL AUTO: ABNORMAL /HPF
BILIRUB SERPL-MCNC: 0.3 MG/DL (ref 0.2–1.2)
BILIRUB UR QL STRIP: ABNORMAL
BUN BLD-MCNC: 8 MG/DL (ref 6–20)
BUN/CREAT SERPL: 17 (ref 7–25)
CALCIUM SPEC-SCNC: 9.2 MG/DL (ref 8.6–10.5)
CANDIDA ALBICANS: POSITIVE
CHLORIDE SERPL-SCNC: 101 MMOL/L (ref 98–107)
CLARITY UR: ABNORMAL
CO2 SERPL-SCNC: 19 MMOL/L (ref 22–29)
COLOR UR: ABNORMAL
CREAT BLD-MCNC: 0.47 MG/DL (ref 0.57–1)
DEPRECATED RDW RBC AUTO: 42.7 FL (ref 37–54)
ERYTHROCYTE [DISTWIDTH] IN BLOOD BY AUTOMATED COUNT: 13.2 % (ref 12.3–15.4)
GARDNERELLA VAGINALIS: NEGATIVE
GFR SERPL CREATININE-BSD FRML MDRD: >150 ML/MIN/1.73
GLOBULIN UR ELPH-MCNC: 3.8 GM/DL
GLUCOSE BLD-MCNC: 78 MG/DL (ref 65–99)
GLUCOSE UR STRIP-MCNC: NEGATIVE MG/DL
HCT VFR BLD AUTO: 36.3 % (ref 34–46.6)
HGB BLD-MCNC: 12.2 G/DL (ref 12–15.9)
HGB UR QL STRIP.AUTO: NEGATIVE
HYALINE CASTS UR QL AUTO: ABNORMAL /LPF
KETONES UR QL STRIP: ABNORMAL
LEUKOCYTE ESTERASE UR QL STRIP.AUTO: ABNORMAL
MCH RBC QN AUTO: 29.7 PG (ref 26.6–33)
MCHC RBC AUTO-ENTMCNC: 33.6 G/DL (ref 31.5–35.7)
MCV RBC AUTO: 88.3 FL (ref 79–97)
NITRITE UR QL STRIP: NEGATIVE
PH UR STRIP.AUTO: 6.5 [PH] (ref 5–9)
PLATELET # BLD AUTO: 256 10*3/MM3 (ref 140–450)
PMV BLD AUTO: 9.9 FL (ref 6–12)
POTASSIUM BLD-SCNC: 3.6 MMOL/L (ref 3.5–5.2)
PROT SERPL-MCNC: 7.6 G/DL (ref 6–8.5)
PROT UR QL STRIP: ABNORMAL
RBC # BLD AUTO: 4.11 10*6/MM3 (ref 3.77–5.28)
RBC # UR: ABNORMAL /HPF
REF LAB TEST METHOD: ABNORMAL
SODIUM BLD-SCNC: 136 MMOL/L (ref 136–145)
SP GR UR STRIP: 1.03 (ref 1–1.03)
SQUAMOUS #/AREA URNS HPF: ABNORMAL /HPF
T VAGINALIS DNA VAG QL PROBE+SIG AMP: NEGATIVE
UROBILINOGEN UR QL STRIP: ABNORMAL
WBC NRBC COR # BLD: 12.96 10*3/MM3 (ref 3.4–10.8)
WBC UR QL AUTO: ABNORMAL /HPF

## 2019-12-02 PROCEDURE — 59025 FETAL NON-STRESS TEST: CPT | Performed by: OBSTETRICS & GYNECOLOGY

## 2019-12-02 PROCEDURE — 87510 GARDNER VAG DNA DIR PROBE: CPT | Performed by: NURSE PRACTITIONER

## 2019-12-02 PROCEDURE — 80053 COMPREHEN METABOLIC PANEL: CPT | Performed by: OBSTETRICS & GYNECOLOGY

## 2019-12-02 PROCEDURE — 81001 URINALYSIS AUTO W/SCOPE: CPT | Performed by: OBSTETRICS & GYNECOLOGY

## 2019-12-02 PROCEDURE — G0463 HOSPITAL OUTPT CLINIC VISIT: HCPCS

## 2019-12-02 PROCEDURE — 59025 FETAL NON-STRESS TEST: CPT

## 2019-12-02 PROCEDURE — 25010000002 DIPHENHYDRAMINE PER 50 MG

## 2019-12-02 PROCEDURE — 87660 TRICHOMONAS VAGIN DIR PROBE: CPT | Performed by: NURSE PRACTITIONER

## 2019-12-02 PROCEDURE — 85027 COMPLETE CBC AUTOMATED: CPT | Performed by: OBSTETRICS & GYNECOLOGY

## 2019-12-02 PROCEDURE — 99024 POSTOP FOLLOW-UP VISIT: CPT | Performed by: NURSE PRACTITIONER

## 2019-12-02 PROCEDURE — 96374 THER/PROPH/DIAG INJ IV PUSH: CPT

## 2019-12-02 PROCEDURE — 87480 CANDIDA DNA DIR PROBE: CPT | Performed by: NURSE PRACTITIONER

## 2019-12-02 RX ORDER — BUTALBITAL, ACETAMINOPHEN AND CAFFEINE 50; 325; 40 MG/1; MG/1; MG/1
2 TABLET ORAL ONCE
Status: COMPLETED | OUTPATIENT
Start: 2019-12-02 | End: 2019-12-02

## 2019-12-02 RX ORDER — DIPHENHYDRAMINE HYDROCHLORIDE 50 MG/ML
INJECTION INTRAMUSCULAR; INTRAVENOUS
Status: COMPLETED
Start: 2019-12-02 | End: 2019-12-02

## 2019-12-02 RX ORDER — SODIUM CHLORIDE, SODIUM LACTATE, POTASSIUM CHLORIDE, CALCIUM CHLORIDE 600; 310; 30; 20 MG/100ML; MG/100ML; MG/100ML; MG/100ML
1000 INJECTION, SOLUTION INTRAVENOUS ONCE
Status: COMPLETED | OUTPATIENT
Start: 2019-12-02 | End: 2019-12-02

## 2019-12-02 RX ORDER — FLUCONAZOLE 150 MG/1
150 TABLET ORAL DAILY
Qty: 1 TABLET | Refills: 0 | Status: SHIPPED | OUTPATIENT
Start: 2019-12-02 | End: 2019-12-20

## 2019-12-02 RX ORDER — FLUCONAZOLE 100 MG/1
100 TABLET ORAL ONCE
Status: COMPLETED | OUTPATIENT
Start: 2019-12-02 | End: 2019-12-02

## 2019-12-02 RX ORDER — DIPHENHYDRAMINE HYDROCHLORIDE 50 MG/ML
25 INJECTION INTRAMUSCULAR; INTRAVENOUS EVERY 6 HOURS PRN
Status: DISCONTINUED | OUTPATIENT
Start: 2019-12-02 | End: 2019-12-02 | Stop reason: HOSPADM

## 2019-12-02 RX ORDER — SODIUM CHLORIDE, SODIUM LACTATE, POTASSIUM CHLORIDE, CALCIUM CHLORIDE 600; 310; 30; 20 MG/100ML; MG/100ML; MG/100ML; MG/100ML
INJECTION, SOLUTION INTRAVENOUS
Status: COMPLETED
Start: 2019-12-02 | End: 2019-12-02

## 2019-12-02 RX ORDER — SODIUM CHLORIDE 0.9 % (FLUSH) 0.9 %
10 SYRINGE (ML) INJECTION AS NEEDED
Status: DISCONTINUED | OUTPATIENT
Start: 2019-12-02 | End: 2019-12-02 | Stop reason: HOSPADM

## 2019-12-02 RX ADMIN — SODIUM CHLORIDE, SODIUM LACTATE, POTASSIUM CHLORIDE, CALCIUM CHLORIDE 1000 ML: 600; 310; 30; 20 INJECTION, SOLUTION INTRAVENOUS at 16:44

## 2019-12-02 RX ADMIN — SODIUM CHLORIDE, POTASSIUM CHLORIDE, SODIUM LACTATE AND CALCIUM CHLORIDE 1000 ML/HR: 600; 310; 30; 20 INJECTION, SOLUTION INTRAVENOUS at 15:57

## 2019-12-02 RX ADMIN — DIPHENHYDRAMINE HYDROCHLORIDE 25 MG: 50 INJECTION INTRAMUSCULAR; INTRAVENOUS at 16:45

## 2019-12-02 RX ADMIN — SODIUM CHLORIDE, POTASSIUM CHLORIDE, SODIUM LACTATE AND CALCIUM CHLORIDE 1000 ML: 600; 310; 30; 20 INJECTION, SOLUTION INTRAVENOUS at 16:44

## 2019-12-02 RX ADMIN — BUTALBITAL, ACETAMINOPHEN, AND CAFFEINE 2 TABLET: 50; 325; 40 TABLET ORAL at 17:05

## 2019-12-02 RX ADMIN — FLUCONAZOLE 100 MG: 100 TABLET ORAL at 18:14

## 2019-12-02 NOTE — PROGRESS NOTES
"CC: Prenatal visit    Millie Quevedo is a 24 y.o.  at 32w2d.  Pt c/o of a persistent headache for the past 2 days with a new onset of seeing spots and \"fuzziness\" in her vision today ; has taken tylenol multiple times since yesterday and it has not helped. She last took 1000mg tylenol at 0800 and fell back asleep and did not wake up until 2 hours ago. She also c/o of sharp, abdominal pains lasting for 1 minute every 20-30 minutes. Denies contractions, LOF, or VB.  Reports decreased fetal movement today. She also reports of spotting that is darker in red today after voiding; reports she has spotted throughout this pregnancy. Denies recent intercourse. She reports drinking 32 ounces of water today.    Initial B/P 132/90.  /92   Wt 95.3 kg (210 lb)   LMP 2019 (Exact Date)   BMI 29.29 kg/m²   SVE: 1/thick/hi  Speculum exam: vag panel obtained, no bleeding visualized      - Reactive NST, 135 bpm. UCs every 2-3 minutes, 40-70 seconds, mild per palpation.   Fetal Heart Rate: 135    / Problems (from 19 to present)     No problems associated with this episode.          A/P: Millie Quevedo is a 24 y.o.  at 32w2d.  - Consulted with Dr. Barrera.  - Option given to patient to  Fioricet for headache and return tomorrow for B/P monitoring. Pt desires to be sent to L&D. She also reports she is unable to pay for medication until tomorrow as she does not get paid till tomorrow.   -Vag panel  -Pt brought to L&D for blood pressure monitoring.      Diagnosis Plan   1. Elevated blood pressure reading     2.  uterine contractions, antepartum  Gardnerella vaginalis, Trichomonas vaginalis, Candida albicans, DNA - Swab, Vagina   3. 32 weeks gestation of pregnancy     4. Acute intractable headache, unspecified headache type     5. Changes in vision       Jayla Savage, APRN  2019  2:54 PM    "

## 2019-12-02 NOTE — NON STRESS TEST
Millie Quevedo, a  at 32w2d with an DELFINO of 2020, by Last Menstrual Period, was seen at [facility] for a nonstress test.    Chief Complaint   Patient presents with   • Elevated Blood Pressure     sent from the office for bp checks       Patient Active Problem List   Diagnosis   • Rh negative status during pregnancy in second trimester   • Hyperemesis gravidarum   • Low back pain during pregnancy, second trimester   • Vaginal discharge       Start Time: 1545  Stop Time: 1615    Interpretation A  Nonstress Test Interpretation A: Reactive (19 : Shantal Lewis, RN)  Comments A: Reviewed with Dr. Barrera (19 : Shantal Lewis, RN)

## 2019-12-03 ENCOUNTER — HOSPITAL ENCOUNTER (INPATIENT)
Facility: HOSPITAL | Age: 24
LOS: 2 days | Discharge: HOME | End: 2019-12-05
Attending: OBSTETRICS & GYNECOLOGY | Admitting: OBSTETRICS & GYNECOLOGY

## 2019-12-03 PROBLEM — O60.00 PRETERM LABOR: Status: ACTIVE | Noted: 2019-12-03

## 2019-12-03 PROBLEM — O60.03 PRETERM LABOR IN THIRD TRIMESTER: Status: ACTIVE | Noted: 2019-12-03

## 2019-12-03 LAB
ABO GROUP BLD: NORMAL
AMPHET+METHAMPHET UR QL: NEGATIVE
AMPHETAMINES UR QL: NEGATIVE
ANTI-D, PASSIVE: NORMAL
BARBITURATES UR QL SCN: POSITIVE
BENZODIAZ UR QL SCN: NEGATIVE
BILIRUB UR QL STRIP: NEGATIVE
BLD GP AB SCN SERPL QL: POSITIVE
BUPRENORPHINE SERPL-MCNC: NEGATIVE NG/ML
CANNABINOIDS SERPL QL: POSITIVE
CLARITY UR: CLEAR
COCAINE UR QL: NEGATIVE
COLOR UR: ABNORMAL
GLUCOSE UR STRIP-MCNC: ABNORMAL MG/DL
HGB UR QL STRIP.AUTO: NEGATIVE
KETONES UR QL STRIP: NEGATIVE
LEUKOCYTE ESTERASE UR QL STRIP.AUTO: NEGATIVE
Lab: NORMAL
METHADONE UR QL SCN: NEGATIVE
NITRITE UR QL STRIP: NEGATIVE
OPIATES UR QL: NEGATIVE
OXYCODONE UR QL SCN: NEGATIVE
PCP UR QL SCN: NEGATIVE
PH UR STRIP.AUTO: 6.5 [PH] (ref 5–9)
PROPOXYPH UR QL: NEGATIVE
PROT UR QL STRIP: ABNORMAL
RH BLD: NEGATIVE
SP GR UR STRIP: 1.03 (ref 1–1.03)
T&S EXPIRATION DATE: NORMAL
TRICYCLICS UR QL SCN: NEGATIVE
UROBILINOGEN UR QL STRIP: ABNORMAL

## 2019-12-03 PROCEDURE — 80307 DRUG TEST PRSMV CHEM ANLYZR: CPT | Performed by: OBSTETRICS & GYNECOLOGY

## 2019-12-03 PROCEDURE — 81003 URINALYSIS AUTO W/O SCOPE: CPT | Performed by: OBSTETRICS & GYNECOLOGY

## 2019-12-03 PROCEDURE — 86901 BLOOD TYPING SEROLOGIC RH(D): CPT | Performed by: OBSTETRICS & GYNECOLOGY

## 2019-12-03 PROCEDURE — 87653 STREP B DNA AMP PROBE: CPT | Performed by: OBSTETRICS & GYNECOLOGY

## 2019-12-03 PROCEDURE — 59025 FETAL NON-STRESS TEST: CPT | Performed by: OBSTETRICS & GYNECOLOGY

## 2019-12-03 PROCEDURE — 86900 BLOOD TYPING SEROLOGIC ABO: CPT | Performed by: OBSTETRICS & GYNECOLOGY

## 2019-12-03 PROCEDURE — G0480 DRUG TEST DEF 1-7 CLASSES: HCPCS | Performed by: OBSTETRICS & GYNECOLOGY

## 2019-12-03 PROCEDURE — 99221 1ST HOSP IP/OBS SF/LOW 40: CPT | Performed by: OBSTETRICS & GYNECOLOGY

## 2019-12-03 PROCEDURE — 86850 RBC ANTIBODY SCREEN: CPT | Performed by: OBSTETRICS & GYNECOLOGY

## 2019-12-03 PROCEDURE — 25010000002 BETAMETHASONE ACET & SOD PHOS PER 4 MG: Performed by: OBSTETRICS & GYNECOLOGY

## 2019-12-03 PROCEDURE — 87086 URINE CULTURE/COLONY COUNT: CPT | Performed by: OBSTETRICS & GYNECOLOGY

## 2019-12-03 PROCEDURE — 86870 RBC ANTIBODY IDENTIFICATION: CPT | Performed by: OBSTETRICS & GYNECOLOGY

## 2019-12-03 RX ORDER — LIDOCAINE HYDROCHLORIDE 10 MG/ML
5 INJECTION, SOLUTION EPIDURAL; INFILTRATION; INTRACAUDAL; PERINEURAL AS NEEDED
Status: DISCONTINUED | OUTPATIENT
Start: 2019-12-03 | End: 2019-12-04 | Stop reason: HOSPADM

## 2019-12-03 RX ORDER — NIFEDIPINE 10 MG/1
10 CAPSULE ORAL EVERY 6 HOURS SCHEDULED
Status: DISCONTINUED | OUTPATIENT
Start: 2019-12-04 | End: 2019-12-05 | Stop reason: HOSPADM

## 2019-12-03 RX ORDER — SODIUM CHLORIDE 0.9 % (FLUSH) 0.9 %
3 SYRINGE (ML) INJECTION EVERY 12 HOURS SCHEDULED
Status: DISCONTINUED | OUTPATIENT
Start: 2019-12-03 | End: 2019-12-04 | Stop reason: HOSPADM

## 2019-12-03 RX ORDER — SODIUM CHLORIDE, SODIUM LACTATE, POTASSIUM CHLORIDE, CALCIUM CHLORIDE 600; 310; 30; 20 MG/100ML; MG/100ML; MG/100ML; MG/100ML
125 INJECTION, SOLUTION INTRAVENOUS CONTINUOUS
Status: DISCONTINUED | OUTPATIENT
Start: 2019-12-03 | End: 2019-12-04

## 2019-12-03 RX ORDER — NIFEDIPINE 10 MG/1
20 CAPSULE ORAL ONCE
Status: COMPLETED | OUTPATIENT
Start: 2019-12-03 | End: 2019-12-03

## 2019-12-03 RX ORDER — BETAMETHASONE SODIUM PHOSPHATE AND BETAMETHASONE ACETATE 3; 3 MG/ML; MG/ML
12 INJECTION, SUSPENSION INTRA-ARTICULAR; INTRALESIONAL; INTRAMUSCULAR; SOFT TISSUE EVERY 24 HOURS
Status: COMPLETED | OUTPATIENT
Start: 2019-12-03 | End: 2019-12-04

## 2019-12-03 RX ORDER — OXYCODONE HYDROCHLORIDE AND ACETAMINOPHEN 5; 325 MG/1; MG/1
1 TABLET ORAL EVERY 6 HOURS PRN
Status: DISCONTINUED | OUTPATIENT
Start: 2019-12-03 | End: 2019-12-05 | Stop reason: HOSPADM

## 2019-12-03 RX ORDER — SODIUM CHLORIDE 0.9 % (FLUSH) 0.9 %
10 SYRINGE (ML) INJECTION AS NEEDED
Status: DISCONTINUED | OUTPATIENT
Start: 2019-12-03 | End: 2019-12-04

## 2019-12-03 RX ORDER — CYCLOBENZAPRINE HCL 10 MG
10 TABLET ORAL 3 TIMES DAILY PRN
Status: DISCONTINUED | OUTPATIENT
Start: 2019-12-03 | End: 2019-12-05 | Stop reason: HOSPADM

## 2019-12-03 RX ORDER — SODIUM CHLORIDE, SODIUM LACTATE, POTASSIUM CHLORIDE, CALCIUM CHLORIDE 600; 310; 30; 20 MG/100ML; MG/100ML; MG/100ML; MG/100ML
125 INJECTION, SOLUTION INTRAVENOUS CONTINUOUS
Status: DISCONTINUED | OUTPATIENT
Start: 2019-12-03 | End: 2019-12-03

## 2019-12-03 RX ORDER — SODIUM CHLORIDE 0.9 % (FLUSH) 0.9 %
3-10 SYRINGE (ML) INJECTION AS NEEDED
Status: DISCONTINUED | OUTPATIENT
Start: 2019-12-03 | End: 2019-12-04 | Stop reason: HOSPADM

## 2019-12-03 RX ADMIN — BETAMETHASONE SODIUM PHOSPHATE AND BETAMETHASONE ACETATE 12 MG: 3; 3 INJECTION, SUSPENSION INTRA-ARTICULAR; INTRALESIONAL; INTRAMUSCULAR at 18:09

## 2019-12-03 RX ADMIN — NIFEDIPINE 20 MG: 10 CAPSULE ORAL at 18:55

## 2019-12-03 RX ADMIN — NIFEDIPINE 10 MG: 10 CAPSULE ORAL at 23:48

## 2019-12-03 RX ADMIN — OXYCODONE HYDROCHLORIDE AND ACETAMINOPHEN 1 TABLET: 5; 325 TABLET ORAL at 23:48

## 2019-12-03 RX ADMIN — SODIUM CHLORIDE, POTASSIUM CHLORIDE, SODIUM LACTATE AND CALCIUM CHLORIDE 125 ML/HR: 600; 310; 30; 20 INJECTION, SOLUTION INTRAVENOUS at 17:54

## 2019-12-03 RX ADMIN — SODIUM CHLORIDE, POTASSIUM CHLORIDE, SODIUM LACTATE AND CALCIUM CHLORIDE 125 ML/HR: 600; 310; 30; 20 INJECTION, SOLUTION INTRAVENOUS at 20:23

## 2019-12-03 RX ADMIN — CYCLOBENZAPRINE HYDROCHLORIDE 10 MG: 10 TABLET, FILM COATED ORAL at 18:54

## 2019-12-03 NOTE — NURSING NOTE
Dr. Colon at bedside doing ultrasound. Cephalic vertex position. Pt c/o contractions. Dr. Colon is going to order procardia.

## 2019-12-04 ENCOUNTER — APPOINTMENT (OUTPATIENT)
Dept: ULTRASOUND IMAGING | Facility: HOSPITAL | Age: 24
End: 2019-12-04

## 2019-12-04 LAB
BACTERIA SPEC AEROBE CULT: NO GROWTH
GROUP B STREP, DNA: POSITIVE

## 2019-12-04 PROCEDURE — 25010000002 BETAMETHASONE ACET & SOD PHOS PER 4 MG: Performed by: OBSTETRICS & GYNECOLOGY

## 2019-12-04 PROCEDURE — 99231 SBSQ HOSP IP/OBS SF/LOW 25: CPT | Performed by: OBSTETRICS & GYNECOLOGY

## 2019-12-04 RX ORDER — FAMOTIDINE 20 MG/1
20 TABLET, FILM COATED ORAL
Status: DISCONTINUED | OUTPATIENT
Start: 2019-12-04 | End: 2019-12-05 | Stop reason: HOSPADM

## 2019-12-04 RX ORDER — ACETAMINOPHEN 500 MG
1000 TABLET ORAL EVERY 6 HOURS PRN
Status: DISCONTINUED | OUTPATIENT
Start: 2019-12-04 | End: 2019-12-05 | Stop reason: HOSPADM

## 2019-12-04 RX ORDER — ACETAMINOPHEN 500 MG
TABLET ORAL
Status: COMPLETED
Start: 2019-12-04 | End: 2019-12-04

## 2019-12-04 RX ORDER — SODIUM CHLORIDE 0.9 % (FLUSH) 0.9 %
10 SYRINGE (ML) INJECTION EVERY 12 HOURS
Status: DISCONTINUED | OUTPATIENT
Start: 2019-12-04 | End: 2019-12-05 | Stop reason: HOSPADM

## 2019-12-04 RX ADMIN — NIFEDIPINE 10 MG: 10 CAPSULE ORAL at 18:06

## 2019-12-04 RX ADMIN — BETAMETHASONE SODIUM PHOSPHATE AND BETAMETHASONE ACETATE 12 MG: 3; 3 INJECTION, SUSPENSION INTRA-ARTICULAR; INTRALESIONAL; INTRAMUSCULAR at 19:25

## 2019-12-04 RX ADMIN — Medication 1000 MG: at 07:21

## 2019-12-04 RX ADMIN — CYCLOBENZAPRINE HYDROCHLORIDE 10 MG: 10 TABLET, FILM COATED ORAL at 19:25

## 2019-12-04 RX ADMIN — NIFEDIPINE 10 MG: 10 CAPSULE ORAL at 06:03

## 2019-12-04 RX ADMIN — ACETAMINOPHEN 1000 MG: 500 TABLET ORAL at 07:21

## 2019-12-04 RX ADMIN — NIFEDIPINE 10 MG: 10 CAPSULE ORAL at 12:27

## 2019-12-04 RX ADMIN — SODIUM CHLORIDE, POTASSIUM CHLORIDE, SODIUM LACTATE AND CALCIUM CHLORIDE 125 ML/HR: 600; 310; 30; 20 INJECTION, SOLUTION INTRAVENOUS at 04:03

## 2019-12-04 RX ADMIN — FAMOTIDINE 20 MG: 20 TABLET ORAL at 18:05

## 2019-12-04 RX ADMIN — OXYCODONE HYDROCHLORIDE AND ACETAMINOPHEN 1 TABLET: 5; 325 TABLET ORAL at 03:46

## 2019-12-04 NOTE — NURSING NOTE
Cath UA performed at this time to rule out UTI. Pt tolerated well. GBS swab collected. Both specimens sent to lab. Results pending.

## 2019-12-04 NOTE — PROGRESS NOTES
" Ralph Quevedo   : [birth year]  MRN: [id number]  CSN: [id number]    L&D Triage note    Subjective    Patient is a 25 yo  at 32 weeks and 4 days, who presents with pelvic and abdominal pains and contractions. She states she has been having contractions for roughly 12 hours. She denies rush of fluid and discharge. She is having spotting and a headache without vision changes. She has a history of hyperemesis graviderum.    Vitals:    19 1656 19 1857 19 2346   BP: 125/75 115/65 108/64   BP Location: Right arm Left arm Left arm   Patient Position: Sitting Lying Lying   Pulse: 89 91 81   Resp: 20 18 18   Temp: 98.3 °F (36.8 °C) 98.3 °F (36.8 °C) 98.4 °F (36.9 °C)   TempSrc: Oral Oral Axillary   SpO2: 97% 97% 97%   Weight: 95.3 kg (209 lb 16 oz)     Height: 180.3 cm (70.98\")            Gen: A&O x3. No distress.  FHR Tracing: reassuring  Cervical exam: not performed this morning    Assessment   25 yo  at 32 weeks and 4 days    Plan  1. Continue current medications, second dose of betamethasone. Continue nifedipine for tocolysis until at least second dose of steroids  2. Continue to monitor her contractions and any cervical changes       This document has been electronically signed by Javier Gordillo MD on 2019 6:58 AM      "

## 2019-12-04 NOTE — NON STRESS TEST
Millie Quevedo, a  at 32w3d with an DELFINO of 2020, by Last Menstrual Period, was seen at [facility] for a nonstress test.    Chief Complaint   Patient presents with   • Leaking Fluid       Patient Active Problem List   Diagnosis   • Rh negative status during pregnancy in second trimester   • Hyperemesis gravidarum   • Low back pain during pregnancy, second trimester   • Vaginal discharge   •  labor       Start Time: 1700  Stop Time: 1730    Interpretation A  Nonstress Test Interpretation A: Reactive (19 : Shantal Lewis, RN)  Comments A: Reviewed with Dr. Colon. (19 : Shantal Lewis, RN)

## 2019-12-04 NOTE — H&P
[facility]  Obstetric History and Physical    Chief Complaint   Patient presents with   • Leaking Fluid   • Contractions   • Flank Pain   • Back Pain           Patient is a 24 y.o. female  currently at 32w3d, who presents with complaint of worsening pelvic and abdominal pain. She states +FM and thinks she is having contractions. Denies any bleeding. Patient was seen yesterday with similar complaints    Her prenatal care is hyperemesis graviderum       Obstetric History   #: 1, Date: None, Sex: None, Weight: None, GA: None, Delivery: None, Apgar1: None, Apgar5: None, Living: None, Birth Comments: None       The following portions of the patients history were reviewed and updated as appropriate: current medications, allergies, past medical history, past surgical history, past family history, past social history and problem list .       Prenatal Information:  Prenatal Results     Initial Prenatal Labs     Test Value Reference Range Date Time    Hemoglobin 12.5 g/dL 12.0 - 15.9 g/dL 19 0525    Hematocrit 36.9 % 34.0 - 46.6 % 19 0525    Platelets 256 10*3/mm3 140 - 450 10*3/mm3 19 1545    Rubella IgG Positive   19 1017    Hepatitis B SAg Non-Reactive  Non-Reactive 19 1017    Hepatitis C Ab Non-Reactive  Non-Reactive 19 1017    RPR Non-Reactive  Non-Reactive 19 1017    ABO O   19 0150    Rh Negative   19 0150    Antibody Screen Positive   19 1017    HIV Non-Reactive  Non-Reactive 19 1017    Urine Culture 25,000 CFU/mL Mixed Raven Isolated   19 1017    Gonorrhea Negative  Negative 19 1017    Chlamydia Negative  Negative 19 1017    TSH              2nd and 3rd Trimester     Test Value Reference Range Date Time    Hemoglobin (repeated) 12.2 g/dL 12.0 - 15.9 g/dL 19 1545    Hematocrit (repeated) 36.3 % 34.0 - 46.6 % 19 1545     mg/dL 60 - 140 mg/dL 10/31/19 0953    Antibody Screen (repeated) Negative   10/31/19  0953    GTT Fasting        GTT 1 Hr        GTT 2 Hr        GTT 3 Hr        Group B Strep              Drug Screening     Test Value Reference Range Date Time    Amphetamine Screen Negative  Negative 12/03/19 1808    Barbiturate Screen Positive  Negative 12/03/19 1808    Benzodiazepine Screen Negative  Negative 12/03/19 1808    Methadone Screen Negative  Negative 12/03/19 1808    Phencyclidine Screen Negative  Negative 12/03/19 1808    Opiates Screen Negative  Negative 12/03/19 1808    THC Screen Positive  Negative 12/03/19 1808    Cocaine Screen Negative  Negative 12/03/19 1808    Propoxyphene Screen Negative  Negative 12/03/19 1808    Buprenorphine Screen Negative  Negative 12/03/19 1808    Methamphetamine Screen Negative  Negative 12/03/19 1808    Oxycodone Screen Negative  Negative 12/03/19 1808    Tricyclic Antidepressants Screen Negative  Negative 12/03/19 1808          Other (Risk screening)     Test Value Reference Range Date Time    Varicella IgG        Parvovirus IgG        CMV IgG        Cystic Fibrosis        Hemoglobin electrophoresis        NIPT        MSAFP-4        AFP (for NTD only)                  External Prenatal Results     Pregnancy Outside Results - Transcribed From Office Records - See Scanned Records For Details     Test Value Date Time    Hgb 12.2 g/dL 12/02/19 1545    Hct 36.3 % 12/02/19 1545    ABO O  07/21/19 0150    Rh Negative  07/21/19 0150    Antibody Screen Negative  10/31/19 0953    Glucose Fasting GTT       Glucose Tolerance Test 1 hour       Glucose Tolerance Test 3 hour       Gonorrhea (discrete) Negative  06/13/19 1017    Chlamydia (discrete) Negative  06/13/19 1017    RPR Non-Reactive  06/13/19 1017    VDRL       Syphilis Antibody       Rubella Positive  06/13/19 1017    HBsAg Non-Reactive  06/13/19 1017    Herpes Simplex Virus PCR       Herpes Simplex VIrus Culture       HIV Non-Reactive  06/13/19 1017    Hep C RNA Quant PCR       Hep C Antibody Non-Reactive  06/13/19 1017     AFP       Group B Strep       GBS Susceptibility to Clindamycin       GBS Susceptibility to Erythromycin       Fetal Fibronectin Negative  19 2214    Genetic Testing, Maternal Blood             Drug Screening     Test Value Date Time    Urine Drug Screen       Amphetamine Screen Negative  19 1808    Barbiturate Screen Positive  19 1808    Benzodiazepine Screen Negative  19 1808    Methadone Screen Negative  19 1808    Phencyclidine Screen Negative  19 1808    Opiates Screen Negative  19 1808    THC Screen Positive  19 1808    Cocaine Screen       Propoxyphene Screen Negative  19 1808    Buprenorphine Screen Negative  19 1808    Methamphetamine Screen       Oxycodone Screen Negative  19 1808    Tricyclic Antidepressants Screen Negative  19 1808                 Past OB History:     Obstetric History       T0      L0     SAB0   TAB0   Ectopic0   Molar0   Multiple0   Live Births0       # Outcome Date GA Lbr Ayush/2nd Weight Sex Delivery Anes PTL Lv   1 Current                    ALLERGIES:     Allergies   Allergen Reactions   • Aspirin Shortness Of Breath     TIGHT CHEST   • Codeine Shortness Of Breath, Itching and Rash   • Naproxen Shortness Of Breath     Tightness of chest   • Erythromycin Base Rash   • Latex Rash     Rash         Home Medications:     Prior to Admission medications    Medication Sig Start Date End Date Taking? Authorizing Provider   acetaminophen (TYLENOL) 500 MG tablet Take 500 mg by mouth Every 6 (Six) Hours As Needed for Mild Pain  (for headaches).   Yes ProviderAnushka MD   albuterol (PROVENTIL HFA;VENTOLIN HFA) 108 (90 BASE) MCG/ACT inhaler Inhale. As needed   Yes ProviderAnushka MD   fluconazole (DIFLUCAN) 150 MG tablet Take 1 tablet by mouth Daily. 19  Yes Jayla Cerrato APRN   nitrofurantoin, macrocrystal-monohydrate, (MACROBID) 100 MG capsule Take 1 capsule by mouth 2 (Two) Times a Day  for 7 days. 11/26/19 12/3/19 Yes Tyshawn Colon DO   omeprazole OTC (PrilOSEC OTC) 20 MG EC tablet Take 1 tablet by mouth Daily. 11/14/19 11/13/20 Yes Tyshawn Colon DO   Ondansetron HCl (ZOFRAN IV) Infuse  into a venous catheter.   Yes Provider, MD Anushka   Prenatal Vit-Fe Fumarate-FA (PRENATAL VITAMIN PLUS LOW IRON) 27-1 MG tablet TAKE 1 TABLET BY MOUTH EVERY DAY 9/16/19  Yes Alicia Mckoen APRN   promethazine (PHENERGAN) 25 MG tablet Take 0.5 tablets by mouth Every 8 (Eight) Hours As Needed for Nausea or Vomiting. 11/14/19  Yes Tyshawn Colon DO       Past Medical History: Past Medical History:   Diagnosis Date   • Abnormal Pap smear of cervix    • Acute maxillary sinusitis    • Acute otitis externa    • Acute pharyngitis    • Acute tonsillitis    • Allergic asthma     IgE-mediated allergic asthma     • Allergic rhinitis    • Allergic rhinitis due to pollen    • Anxiety    • Asthma    • Chondromalacia of patella     left knee    • Common cold    • Cough    • Diarrhea    • Disorder of gallbladder     Probable biliary dyskinesia    • Epigastric pain    • Foot pain    • Gastroenteritis    • Generalized abdominal pain    • Headache    • History of colonoscopy 03/27/2013    Colon endoscopy 70635 (1)  -  Internal & external hemorrhoids found.   • History of esophagogastroduodenoscopy (EGD) 03/27/2013    w/ tube 61765 (1)  -  Normal esophagus. Gastritis in stomach. Biopsy taken. Normal duodenum. Biospy taken   • History of marijuana use    • HPV (human papilloma virus) infection    • IBS (irritable bowel syndrome)    • Influenza    • Irregular periods    • Irritable bowel syndrome    • Migraine    • Nausea    • Nausea and vomiting    • Osgood-Schlatter's disease    • Pain in throat    • Patellar tendonitis    • Right upper quadrant pain    • Streptococcal sore throat    • Temporomandibular joint disorder     WISDOM TEETH    • Upper respiratory infection    • Urgency of urination    •  "Urinary tract infectious disease    • Varicella    • Viral gastroenteritis    • Vulvovaginitis       Past Surgical History Past Surgical History:   Procedure Laterality Date   • CHOLECYSTECTOMY  06/06/2013    Laparoscopic  -  laparoscopic cholecystectomy with intraoperative cholangiogram. Cholecystitis.   • INJECTION OF MEDICATION  01/08/2013    Toradol (1)   -  W. Sotomayor   • LAPAROSCOPIC CHOLECYSTECTOMY     • WISDOM TOOTH EXTRACTION        Family History: Family History   Adopted: Yes   Problem Relation Age of Onset   • Cancer Other    • Diabetes Other    • Heart disease Other    • Hypertension Other    • Stroke Other    • Lung disease Other    • Cholelithiasis Other    • Ulcers Other    • Other Other         Colon problems   • Ovarian cysts Mother    • Bipolar disorder Mother    • Irritable bowel syndrome Mother    • Ovarian cancer Mother    • No Known Problems Sister    • Emphysema Maternal Grandmother    • Heart attack Maternal Grandfather    • No Known Problems Sister    • No Known Problems Sister       Social History:  reports that she has never smoked. She has never used smokeless tobacco.   reports that she does not drink alcohol.   reports that she uses drugs. Drug: Marijuana.        Review of Systems                                                                                                                  Neuro no history of brain tumor    HENT no history of ear tumors    Eye no history of retinal tumors    Pulmonary no history of lung tumors    Cardiac no history of cardiac tumors    GI: No history of small bowel tumors    Musculoskeletal: No history of skeletal muscle tumors    Endocrine: No history of adrenal tumors    Lymphatic: No history of Hodgkin's disease    Renal: No history of renal cancer      Objective     Documented Vitals    12/03/19 1656   BP: 125/75   Pulse: 89   Resp: 20   Temp: 98.3 °F (36.8 °C)   SpO2: 97%   Weight: 95.3 kg (209 lb 16 oz)   Height: 180.3 cm (70.98\")       OBGyn " Exam  Constitutional: Appears to be in no acute distress; Eyes: sclera normal; Endocrine system: thyroid palpate is normal; Pulmonary system: lungs clear; Cardiovascular system: heart regular rate and rhythm; Gastrointestinal system: abdomen soft nontender, active bowel sounds; Urologic system: CVA negative; Psychiatric: appropriate insight; Neurologic: gait within normal limits cervix 1-2/50/-2 small amount of possible change from check yesterday.      Last Labs  Lab Results   Component Value Date    WBC 12.96 (H) 2019    RBC 4.11 2019    HGB 12.2 2019    HCT 36.3 2019    MCV 88.3 2019    MCH 29.7 2019    MCHC 33.6 2019    RDW 13.2 2019    RDWSD 42.7 2019    MPV 9.9 2019     2019        Lab Results   Component Value Date    GLUCOSE 78 2019    BUN 8 2019    CREATININE 0.47 (L) 2019     2019    K 3.6 2019     2019    CO2 19.0 (L) 2019    CALCIUM 9.2 2019    PROTEINTOT 7.6 2019    ALBUMIN 3.80 2019    ALT 11 2019    AST 13 2019    ALKPHOS 112 2019    BILITOT 0.3 2019    EGFRIFNONA >150 2019    GLOB 3.8 2019    AGRATIO 1.0 2019    BCR 17.0 2019    ANIONGAP 16.0 (H) 2019       No results found for: HCGQUAL      Assessment/Plan:  1. 24 y.o. [id number]. Patient with concern for  labor, with mild amount of cervical change. Negative FFN last week, but given some possible cervical change and some contraction.   2. Betamethasone  3. Regular diet  4. Continuous monitoring  5. Cath UA  6. Nifedipine for tocolysis      Millie Quevedo and I have discussed pain goals for this hospitalization after reviewing her current clinical condition, medical history and prior pain experiences.  The goal is to keep her pain level tolerable.  To help achieve this, I plan to treat with tylenol if needed.           This document has  been electronically signed by Tyshawn Colon DO on December 3, 2019 7:42 PM

## 2019-12-05 VITALS
TEMPERATURE: 98.4 F | OXYGEN SATURATION: 95 % | WEIGHT: 210 LBS | SYSTOLIC BLOOD PRESSURE: 129 MMHG | BODY MASS INDEX: 29.4 KG/M2 | HEART RATE: 85 BPM | RESPIRATION RATE: 18 BRPM | HEIGHT: 71 IN | DIASTOLIC BLOOD PRESSURE: 72 MMHG

## 2019-12-05 PROCEDURE — 99238 HOSP IP/OBS DSCHRG MGMT 30/<: CPT | Performed by: OBSTETRICS & GYNECOLOGY

## 2019-12-05 PROCEDURE — 59025 FETAL NON-STRESS TEST: CPT | Performed by: OBSTETRICS & GYNECOLOGY

## 2019-12-05 RX ORDER — CYCLOBENZAPRINE HCL 10 MG
10 TABLET ORAL 3 TIMES DAILY PRN
Qty: 15 TABLET | Refills: 0 | Status: SHIPPED | OUTPATIENT
Start: 2019-12-05 | End: 2019-12-20

## 2019-12-05 RX ADMIN — NIFEDIPINE 10 MG: 10 CAPSULE ORAL at 01:11

## 2019-12-05 NOTE — PLAN OF CARE
Problem: Patient Care Overview  Goal: Plan of Care Review  Outcome: Ongoing (interventions implemented as appropriate)   19 5093   Coping/Psychosocial   Plan of Care Reviewed With patient   Plan of Care Review   Progress no change   OTHER   Outcome Summary VSS. patient SVE showed no change; patient will receive 2nd betamethasone injection this shift. patient voids.      Goal: Individualization and Mutuality  Outcome: Ongoing (interventions implemented as appropriate)    Goal: Discharge Needs Assessment  Outcome: Ongoing (interventions implemented as appropriate)      Problem:  Labor (Adult,Obstetrics,Pediatric)  Goal: Signs and Symptoms of Listed Potential Problems Will be Absent, Minimized or Managed ( Labor)  Outcome: Ongoing (interventions implemented as appropriate)

## 2019-12-05 NOTE — NON STRESS TEST
Millie Quevedo, aliya  at 32w5d with an DELFINO of 2020, by Last Menstrual Period, was seen at [facility] for a nonstress test.    Chief Complaint   Patient presents with   • Leaking Fluid   • Contractions   • Flank Pain   • Back Pain       Patient Active Problem List   Diagnosis   • Rh negative status during pregnancy in second trimester   • Hyperemesis gravidarum   • Low back pain during pregnancy, second trimester   • Vaginal discharge   •  labor   •  labor in third trimester       Start Time: 0550  Stop Time: 610    Interpretation A  Nonstress Test Interpretation A: Reactive (19 : Hetal Peralta, RN)  Comments A: Reviewed with ARJUN Ford RN  (19 : Hetal Peralta, RN)    I have seen and evaluated the patient.  I have discussed the case with the resident. I have reviewed the notes, assessment and plan, and/or procedures performed by the resident. I concur with the resident’s documentation.        This document has been electronically signed by Memo Miranda MD on 2019 4:59 PM

## 2019-12-05 NOTE — PLAN OF CARE
Problem: Patient Care Overview  Goal: Plan of Care Review  Outcome: Ongoing (interventions implemented as appropriate)   19 0459   Coping/Psychosocial   Plan of Care Reviewed With patient   Plan of Care Review   Progress improving   OTHER   Outcome Summary Ambulating in room, Received 2nd does of betamethasone. NST q shift.      Goal: Discharge Needs Assessment  Outcome: Ongoing (interventions implemented as appropriate)    Goal: Interprofessional Rounds/Family Conf  Outcome: Ongoing (interventions implemented as appropriate)      Problem:  Labor (Adult,Obstetrics,Pediatric)  Goal: Signs and Symptoms of Listed Potential Problems Will be Absent, Minimized or Managed ( Labor)  Outcome: Ongoing (interventions implemented as appropriate)

## 2019-12-05 NOTE — DISCHARGE SUMMARY
[facility]  Millie Quevedo  : 1995  MRN: [id number]  CSN: [id number]    Discharge Summary      Date of Admission: 12/3/2019   Date of Discharge: 2019   Discharge Diagnosis: 1.   labor at 32 weeks without vaginal delivery   Procedures Performed:  None      Hospital Course:  Patient was admitted to the hospital for  contractions, was given steroid injections for fetal lung maturity and tocolyse this for contractions, over 2 days her cervical dilation did not change and pain and contractions subsided.  Patient had uncomplicated course with reassuring fetal monitoring during hospital stay   Pending Studies:  Labs:  None  Lab Results (last 72 hours)     Procedure Component Value Units Date/Time    Urine Culture - Urine, Urine, Catheter In/Out [[id number]]  (Normal) Collected:  19    Specimen:  Urine, Catheter In/Out Updated:  19 170     Urine Culture No growth    Group B Strep (Molecular) - Swab, Vaginal/Rectum [[id number]]  (Abnormal) Collected:  19    Specimen:  Swab from Vaginal/Rectum Updated:  19 1301     Group B Strep, DNA Positive    Urine Drug Screen - [[id number]]  (Abnormal) Collected:  19    Specimen:  Urine Updated:  19 1913     THC, Screen, Urine Positive     Phencyclidine (PCP), Urine Negative     Cocaine Screen, Urine Negative     Methamphetamine, Ur Negative     Opiate Screen Negative     Amphetamine Screen, Urine Negative     Benzodiazepine Screen, Urine Negative     Tricyclic Antidepressants Screen Negative     Methadone Screen, Urine Negative     Barbiturates Screen, Urine Positive     Oxycodone Screen, Urine Negative     Propoxyphene Screen Negative     Buprenorphine, Screen, Urine Negative    Narrative:       Cutoff For Drugs Screened:    Amphetamines               500 ng/ml  Barbiturates               200 ng/ml  Benzodiazepines            150 ng/ml  Cocaine                    150 ng/ml  Methadone                   200 ng/ml  Opiates                    100 ng/ml  Phencyclidine               25 ng/ml  THC                            50 ng/ml  Methamphetamine            500 ng/ml  Tricyclic Antidepressants  300 ng/ml  Oxycodone                  100 ng/ml  Propoxyphene               300 ng/ml  Buprenorphine               10 ng/ml    The normal value for all drugs tested is negative. This report includes unconfirmed screening results, with the cutoff values listed, to be used for medical treatment purposes only.  Unconfirmed results must not be used for non-medical purposes such as employment or legal testing.  Clinical consideration should be applied to any drug of abuse test, particularly when unconfirmed results are used.      Barbiturate, Urine, Confirmation - Urine, Clean Catch [[id number]] Collected:  12/03/19 1808    Specimen:  Urine, Clean Catch Updated:  12/03/19 1909    Cannabinoid Confirmation, Ur - Urine, Clean Catch [[id number]] Collected:  12/03/19 1808    Specimen:  Urine, Clean Catch Updated:  12/03/19 1909    Urinalysis With Culture If Indicated - Urine, Catheter In/Out [[id number]]  (Abnormal) Collected:  12/03/19 1808    Specimen:  Urine, Catheter In/Out Updated:  12/03/19 1819     Color, UA Dark Yellow     Appearance, UA Clear     pH, UA 6.5     Specific Gravity, UA 1.026     Glucose,  mg/dL (Trace)     Ketones, UA Negative     Bilirubin, UA Negative     Blood, UA Negative     Protein, UA Trace     Leuk Esterase, UA Negative     Nitrite, UA Negative     Urobilinogen, UA 1.0 E.U./dL    Narrative:       Urine microscopic not indicated.         Condition at Discharge: Stable   Discharge Diet:  Regular   Discharge Activity: Activity Instructions     Activity as Tolerated      Driving Restrictions      Type of Restriction:  Driving    Driving Restrictions:  No Driving Restrictions         Discharge Medications:    Your medication list      START taking these medications      Instructions Last Dose Given Next Dose  Due   cyclobenzaprine 10 MG tablet  Commonly known as:  FLEXERIL      Take 1 tablet by mouth 3 (Three) Times a Day As Needed for Muscle Spasms.          CONTINUE taking these medications      Instructions Last Dose Given Next Dose Due   acetaminophen 500 MG tablet  Commonly known as:  TYLENOL      Take 500 mg by mouth Every 6 (Six) Hours As Needed for Mild Pain  (for headaches).       albuterol sulfate  (90 Base) MCG/ACT inhaler  Commonly known as:  PROVENTIL HFA;VENTOLIN HFA;PROAIR HFA      Inhale. As needed       fluconazole 150 MG tablet  Commonly known as:  DIFLUCAN      Take 1 tablet by mouth Daily.       omeprazole OTC 20 MG EC tablet  Commonly known as:  PrilOSEC OTC      Take 1 tablet by mouth Daily.       PRENATAL VITAMIN PLUS LOW IRON 27-1 MG tablet      TAKE 1 TABLET BY MOUTH EVERY DAY       promethazine 25 MG tablet  Commonly known as:  PHENERGAN      Take 0.5 tablets by mouth Every 8 (Eight) Hours As Needed for Nausea or Vomiting.       ZOFRAN IV      Infuse  into a venous catheter.          STOP taking these medications    nitrofurantoin (macrocrystal-monohydrate) 100 MG capsule  Commonly known as:  MACROBID              Where to Get Your Medications      These medications were sent to [facility]/pharmacy #[id number] - [place], [place] - [address] [place] [address] [phone]  - [phone] [address] [address]    Phone:  [phone] ·   cyclobenzaprine 10 MG tablet        Discharge Disposition: home   Follow-up: Future Appointments   Date Time Provider Department Center   12/5/2019  1:00 PM [facility] None   12/13/2019  9:15 AM Tyshawn Colon DO [facility] None   12/19/2019  1:00 PM [facility] None            This note has been electronically signed.    Tyshawn Colon DO  December 5, 2019  7:07 AM

## 2019-12-05 NOTE — PROGRESS NOTES
[facility]   : [birth year]  MRN: [id number]  CSN: [id number]    L&D Triage note    Subjective    Patient is a 23 yo  at 32 weeks and 4 days, who presents with pelvic and abdominal pains and contractions. She had previously been having some contractions/cramping. She denied rupture of membranes and pain. Yesterday she was having headaches that have since subsided. She is not having any bleeding or spotting today. Denies discharge.    Vitals:    19 1925 19 0114 19 0156 19 0538   BP: 120/68 109/61 112/55 115/69   BP Location: Left arm      Patient Position: Sitting      Pulse: 94 86 100 80   Resp: 18      Temp: 98.8 °F (37.1 °C)      TempSrc: Oral      SpO2: 98% 97%     Weight:       Height:              Gen: A&O x3. No distress.  Abd: Soft, nontender, Bowel sounds normal  Cardio: RRR, no m/r/g  Lungs: CTA    Assessment   23 yo  at 32 weeks and 4 days with a history of hyperemesis graviderum who presented with possible  labor, abdominal pain and contractions. She has gotten betamethasone and nifedipine. NST was reassuring.    Plan  1. Continue current medications, received second dose of betamethasone. Continue nifedipine for tocolysis until at least second dose of steroids  2. Continue to monitor her contractions and any cervical changes  3. Possible d/c today       This document has been electronically signed by Javier Gordillo MD on 2019 6:04 AM

## 2019-12-06 NOTE — PAYOR COMM NOTE
"Faby Roque  [facility]  [phone])[phone]  (F)[phone]          EmyMillieia (24 y.o. Female)     Date of Birth Social Security Number Address Home Phone MRN    1995  [address] [address]  [id number]    Adventism Marital Status          Gnosticism        Admission Date Admission Type Admitting Provider Attending Provider Department, Room/Bed    12/3/19 Elective Tyshawn Colon DO  [facility] MOTHER BABY, M767/1    Discharge Date Discharge Disposition Discharge Destination        2019 Home or Self Care              Attending Provider:  (none)   Allergies:  Aspirin, Codeine, Naproxen, Erythromycin Base, Latex    Isolation:  None   Infection:  None   Code Status:  Prior    Ht:  180.3 cm (70.98\")   Wt:  95.3 kg (209 lb 16 oz)    Admission Cmt:  None   Principal Problem:  None                Active Insurance as of 12/3/2019     Primary Coverage     Payor Plan Insurance Group Employer/Plan Group    [facility] BLUE CROSS ANTH BLUE CROSS BLUE [facility] PPO [id number]     Payor Plan Address Payor Plan Phone Number Payor Plan Fax Number Effective Dates    [address] [phone]  2019 - None Entered    [facility] [id number]       Subscriber Name Subscriber Birth Date Member ID       PEYMAN QUEVEDO 3/25/1995 [id number]                 Emergency Contacts      (Rel.) Home Phone Work Phone Mobile Phone    Remy Quevedo (Spouse) [phone] -- [phone]    Mindy Valerio (Relative) [phone] -- [phone]    LAURYN HERR (Relative) [phone] -- [phone]               Discharge Summary      Tyshawn Colon DO at 19 [address] [place] [address]  Millie Quevedo  : 1995  MRN: [id number]  CSN: [id number]    Discharge Summary      Date of Admission: 12/3/2019   Date of Discharge: 2019   Discharge Diagnosis: 1.   labor at 32 weeks without vaginal " delivery   Procedures Performed:  None      Hospital Course:  Patient was admitted to the hospital for  contractions, was given steroid injections for fetal lung maturity and tocolyse this for contractions, over 2 days her cervical dilation did not change and pain and contractions subsided.  Patient had uncomplicated course with reassuring fetal monitoring during hospital stay   Pending Studies:  Labs:  None  Lab Results (last 72 hours)     Procedure Component Value Units Date/Time    Urine Culture - Urine, Urine, Catheter In/Out [[id number]]  (Normal) Collected:  19    Specimen:  Urine, Catheter In/Out Updated:  19 170     Urine Culture No growth    Group B Strep (Molecular) - Swab, Vaginal/Rectum [[id number]]  (Abnormal) Collected:  19    Specimen:  Swab from Vaginal/Rectum Updated:  19 1301     Group B Strep, DNA Positive    Urine Drug Screen - [[id number]]  (Abnormal) Collected:  19    Specimen:  Urine Updated:  19 1913     THC, Screen, Urine Positive     Phencyclidine (PCP), Urine Negative     Cocaine Screen, Urine Negative     Methamphetamine, Ur Negative     Opiate Screen Negative     Amphetamine Screen, Urine Negative     Benzodiazepine Screen, Urine Negative     Tricyclic Antidepressants Screen Negative     Methadone Screen, Urine Negative     Barbiturates Screen, Urine Positive     Oxycodone Screen, Urine Negative     Propoxyphene Screen Negative     Buprenorphine, Screen, Urine Negative    Narrative:       Cutoff For Drugs Screened:    Amphetamines               500 ng/ml  Barbiturates               200 ng/ml  Benzodiazepines            150 ng/ml  Cocaine                    150 ng/ml  Methadone                  200 ng/ml  Opiates                    100 ng/ml  Phencyclidine               25 ng/ml  THC                            50 ng/ml  Methamphetamine            500 ng/ml  Tricyclic Antidepressants  300 ng/ml  Oxycodone                  100  ng/ml  Propoxyphene               300 ng/ml  Buprenorphine               10 ng/ml    The normal value for all drugs tested is negative. This report includes unconfirmed screening results, with the cutoff values listed, to be used for medical treatment purposes only.  Unconfirmed results must not be used for non-medical purposes such as employment or legal testing.  Clinical consideration should be applied to any drug of abuse test, particularly when unconfirmed results are used.      Barbiturate, Urine, Confirmation - Urine, Clean Catch [[id number]] Collected:  12/03/19 1808    Specimen:  Urine, Clean Catch Updated:  12/03/19 1909    Cannabinoid Confirmation, Ur - Urine, Clean Catch [[id number]] Collected:  12/03/19 1808    Specimen:  Urine, Clean Catch Updated:  12/03/19 1909    Urinalysis With Culture If Indicated - Urine, Catheter In/Out [[id number]]  (Abnormal) Collected:  12/03/19 1808    Specimen:  Urine, Catheter In/Out Updated:  12/03/19 1819     Color, UA Dark Yellow     Appearance, UA Clear     pH, UA 6.5     Specific Gravity, UA 1.026     Glucose,  mg/dL (Trace)     Ketones, UA Negative     Bilirubin, UA Negative     Blood, UA Negative     Protein, UA Trace     Leuk Esterase, UA Negative     Nitrite, UA Negative     Urobilinogen, UA 1.0 E.U./dL    Narrative:       Urine microscopic not indicated.         Condition at Discharge: Stable   Discharge Diet:  Regular   Discharge Activity: Activity Instructions     Activity as Tolerated      Driving Restrictions      Type of Restriction:  Driving    Driving Restrictions:  No Driving Restrictions         Discharge Medications:    Your medication list      START taking these medications      Instructions Last Dose Given Next Dose Due   cyclobenzaprine 10 MG tablet  Commonly known as:  FLEXERIL      Take 1 tablet by mouth 3 (Three) Times a Day As Needed for Muscle Spasms.          CONTINUE taking these medications      Instructions Last Dose Given Next Dose Due    acetaminophen 500 MG tablet  Commonly known as:  TYLENOL      Take 500 mg by mouth Every 6 (Six) Hours As Needed for Mild Pain  (for headaches).       albuterol sulfate  (90 Base) MCG/ACT inhaler  Commonly known as:  PROVENTIL HFA;VENTOLIN HFA;PROAIR HFA      Inhale. As needed       fluconazole 150 MG tablet  Commonly known as:  DIFLUCAN      Take 1 tablet by mouth Daily.       omeprazole OTC 20 MG EC tablet  Commonly known as:  PrilOSEC OTC      Take 1 tablet by mouth Daily.       PRENATAL VITAMIN PLUS LOW IRON 27-1 MG tablet      TAKE 1 TABLET BY MOUTH EVERY DAY       promethazine 25 MG tablet  Commonly known as:  PHENERGAN      Take 0.5 tablets by mouth Every 8 (Eight) Hours As Needed for Nausea or Vomiting.       ZOFRAN IV      Infuse  into a venous catheter.          STOP taking these medications    nitrofurantoin (macrocrystal-monohydrate) 100 MG capsule  Commonly known as:  MACROBID              Where to Get Your Medications      These medications were sent to [facility]/pharmacy #[id number] - [address] [phone] [place] [phone] [address] [address]    Phone:  [phone] ·   cyclobenzaprine 10 MG tablet        Discharge Disposition: home   Follow-up: Future Appointments   Date Time Provider Department Center   12/5/2019  1:00 PM [facility] None   12/13/2019  9:15 AM Garry Mendoza DO [facility] None   12/19/2019  1:00 PM [facility] None            This note has been electronically signed.    Garry Mendoza DO  December 5, 2019  7:07 AM      Electronically signed by Garry Mendoza DO at 12/05/19 0708       Discharge Order (From admission, onward)    Start     Ordered    12/05/19 0704  Discharge patient  Once     Expected Discharge Date:  12/05/19    Discharge Disposition:  Home or Self Care    Physician of Record for Attribution - Please select from Treatment Team:  GARRY MENDOZA [[id number]]     Review needed by CMO to determine Physician of Record:  No       Question Answer Comment   Physician of Record for Attribution - Please select from Treatment Team GARRY MENDOZA    Review needed by CMO to determine Physician of Record No        12/05/19 0706

## 2019-12-08 LAB — CANNABINOIDS UR QL CFM: POSITIVE

## 2019-12-10 ENCOUNTER — HOSPITAL ENCOUNTER (OUTPATIENT)
Facility: HOSPITAL | Age: 24
Discharge: HOME | End: 2019-12-10
Attending: OBSTETRICS & GYNECOLOGY | Admitting: OBSTETRICS & GYNECOLOGY

## 2019-12-10 VITALS
OXYGEN SATURATION: 97 % | HEART RATE: 99 BPM | TEMPERATURE: 97.7 F | SYSTOLIC BLOOD PRESSURE: 124 MMHG | DIASTOLIC BLOOD PRESSURE: 69 MMHG | RESPIRATION RATE: 18 BRPM

## 2019-12-10 DIAGNOSIS — E86.0 DEHYDRATION: ICD-10-CM

## 2019-12-10 LAB
BARBITURATES UR QL: POSITIVE
BILIRUB UR QL STRIP: NEGATIVE
CLARITY UR: ABNORMAL
COLOR UR: ABNORMAL
GLUCOSE UR STRIP-MCNC: ABNORMAL MG/DL
HGB UR QL STRIP.AUTO: NEGATIVE
KETONES UR QL STRIP: NEGATIVE
LEUKOCYTE ESTERASE UR QL STRIP.AUTO: ABNORMAL
NITRITE UR QL STRIP: NEGATIVE
PH UR STRIP.AUTO: 6.5 [PH] (ref 5–9)
PROT UR QL STRIP: ABNORMAL
SP GR UR STRIP: 1.02 (ref 1–1.03)
UROBILINOGEN UR QL STRIP: ABNORMAL

## 2019-12-10 PROCEDURE — 81001 URINALYSIS AUTO W/SCOPE: CPT | Performed by: OBSTETRICS & GYNECOLOGY

## 2019-12-10 PROCEDURE — 59025 FETAL NON-STRESS TEST: CPT

## 2019-12-10 PROCEDURE — G0463 HOSPITAL OUTPT CLINIC VISIT: HCPCS

## 2019-12-10 PROCEDURE — 59025 FETAL NON-STRESS TEST: CPT | Performed by: OBSTETRICS & GYNECOLOGY

## 2019-12-10 PROCEDURE — 87086 URINE CULTURE/COLONY COUNT: CPT | Performed by: OBSTETRICS & GYNECOLOGY

## 2019-12-11 DIAGNOSIS — E86.0 DEHYDRATION: Primary | ICD-10-CM

## 2019-12-11 LAB
BACTERIA UR QL AUTO: ABNORMAL /HPF
HYALINE CASTS UR QL AUTO: ABNORMAL /LPF
RBC # UR: ABNORMAL /HPF
REF LAB TEST METHOD: ABNORMAL
SQUAMOUS #/AREA URNS HPF: ABNORMAL /HPF
WBC UR QL AUTO: ABNORMAL /HPF

## 2019-12-11 NOTE — NON STRESS TEST
Millie Quevedo, a  at 33w3d with an DELFINO of 2020, by Last Menstrual Period, was seen at [facility] LABOR DELIVERY for a nonstress test.    Chief Complaint   Patient presents with   • Syncope     Pt states she has been feeling bad all day.  States she went to the bathroom and woke up in the bathroom floor.  Denies vaginal bleeding.  Reports fetal movement.       Patient Active Problem List   Diagnosis   • Rh negative status during pregnancy in second trimester   • Hyperemesis gravidarum   • Low back pain during pregnancy, second trimester   • Vaginal discharge   •  labor   •  labor in third trimester       Start Time:   Stop Time:     Interpretation A  Nonstress Test Interpretation A: Reactive (12/10/19 2334 : Krystal Reno, RN)  Comments A: Reviewed with LOS Wright RN (12/10/19 2334 : Krystal Reno, RN)

## 2019-12-12 ENCOUNTER — HOSPITAL ENCOUNTER (OUTPATIENT)
Facility: HOSPITAL | Age: 24
Discharge: HOME | End: 2019-12-12
Attending: OBSTETRICS & GYNECOLOGY | Admitting: OBSTETRICS & GYNECOLOGY

## 2019-12-12 VITALS
DIASTOLIC BLOOD PRESSURE: 62 MMHG | RESPIRATION RATE: 18 BRPM | OXYGEN SATURATION: 98 % | SYSTOLIC BLOOD PRESSURE: 128 MMHG | TEMPERATURE: 98.1 F | HEART RATE: 108 BPM

## 2019-12-12 LAB
BACTERIA SPEC AEROBE CULT: NORMAL
BACTERIA UR QL AUTO: ABNORMAL /HPF
BILIRUB UR QL STRIP: NEGATIVE
CANDIDA ALBICANS: NEGATIVE
CLARITY UR: ABNORMAL
COLOR UR: YELLOW
GARDNERELLA VAGINALIS: NEGATIVE
GLUCOSE UR STRIP-MCNC: ABNORMAL MG/DL
HGB UR QL STRIP.AUTO: NEGATIVE
HYALINE CASTS UR QL AUTO: ABNORMAL /LPF
KETONES UR QL STRIP: NEGATIVE
LEUKOCYTE ESTERASE UR QL STRIP.AUTO: ABNORMAL
NITRITE UR QL STRIP: NEGATIVE
PH UR STRIP.AUTO: 6 [PH] (ref 5–9)
PROT UR QL STRIP: ABNORMAL
RBC # UR: ABNORMAL /HPF
REF LAB TEST METHOD: ABNORMAL
SP GR UR STRIP: 1.03 (ref 1–1.03)
SQUAMOUS #/AREA URNS HPF: ABNORMAL /HPF
T VAGINALIS DNA VAG QL PROBE+SIG AMP: NEGATIVE
UROBILINOGEN UR QL STRIP: ABNORMAL
WBC UR QL AUTO: ABNORMAL /HPF

## 2019-12-12 PROCEDURE — 59025 FETAL NON-STRESS TEST: CPT | Performed by: OBSTETRICS & GYNECOLOGY

## 2019-12-12 PROCEDURE — 87480 CANDIDA DNA DIR PROBE: CPT | Performed by: OBSTETRICS & GYNECOLOGY

## 2019-12-12 PROCEDURE — 59025 FETAL NON-STRESS TEST: CPT

## 2019-12-12 PROCEDURE — 87660 TRICHOMONAS VAGIN DIR PROBE: CPT | Performed by: OBSTETRICS & GYNECOLOGY

## 2019-12-12 PROCEDURE — 81001 URINALYSIS AUTO W/SCOPE: CPT | Performed by: OBSTETRICS & GYNECOLOGY

## 2019-12-12 PROCEDURE — 87510 GARDNER VAG DNA DIR PROBE: CPT | Performed by: OBSTETRICS & GYNECOLOGY

## 2019-12-12 PROCEDURE — G0463 HOSPITAL OUTPT CLINIC VISIT: HCPCS

## 2019-12-12 RX ORDER — NITROFURANTOIN 25; 75 MG/1; MG/1
100 CAPSULE ORAL 2 TIMES DAILY
Qty: 14 CAPSULE | Refills: 0 | Status: SHIPPED | OUTPATIENT
Start: 2019-12-12 | End: 2019-12-20

## 2019-12-12 RX ORDER — NITROFURANTOIN 25; 75 MG/1; MG/1
100 CAPSULE ORAL EVERY 12 HOURS SCHEDULED
Status: COMPLETED | OUTPATIENT
Start: 2019-12-12 | End: 2019-12-12

## 2019-12-12 RX ADMIN — NITROFURANTOIN MONOHYDRATE/MACROCRYSTALLINE 100 MG: 25; 75 CAPSULE ORAL at 22:03

## 2019-12-13 PROCEDURE — G0463 HOSPITAL OUTPT CLINIC VISIT: HCPCS

## 2019-12-13 PROCEDURE — 59025 FETAL NON-STRESS TEST: CPT

## 2019-12-13 NOTE — NON STRESS TEST
Millie Quevedo, a  at 33w6d with an DELFINO of 2020, by Last Menstrual Period, was seen at [facility] LABOR DELIVERY for a nonstress test.    Chief Complaint   Patient presents with   • Contractions       Patient Active Problem List   Diagnosis   • Rh negative status during pregnancy in second trimester   • Hyperemesis gravidarum   • Low back pain during pregnancy, second trimester   • Vaginal discharge   •  labor   •  labor in third trimester       Start Time:   Stop Time:      Reactive.   Reviewed with ONESIMO Zambrano RN

## 2019-12-13 NOTE — DISCHARGE INSTRUCTIONS
Call [facility] center 12/13/2019 in the am to schedule appointment for next week.   Return if vaginal bleeding or leaking of fluid occurs or contractions intensify.

## 2019-12-17 DIAGNOSIS — Z3A.32 32 WEEKS GESTATION OF PREGNANCY: ICD-10-CM

## 2019-12-17 DIAGNOSIS — O60.03 PRETERM LABOR WITHOUT DELIVERY, THIRD TRIMESTER: Primary | ICD-10-CM

## 2019-12-18 DIAGNOSIS — O60.03 PRETERM LABOR WITHOUT DELIVERY, THIRD TRIMESTER: ICD-10-CM

## 2019-12-18 DIAGNOSIS — Z3A.32 32 WEEKS GESTATION OF PREGNANCY: ICD-10-CM

## 2019-12-19 ENCOUNTER — ROUTINE PRENATAL (OUTPATIENT)
Dept: OBSTETRICS AND GYNECOLOGY | Facility: CLINIC | Age: 24
End: 2019-12-19

## 2019-12-19 VITALS — BODY MASS INDEX: 29.58 KG/M2 | WEIGHT: 212 LBS | DIASTOLIC BLOOD PRESSURE: 80 MMHG | SYSTOLIC BLOOD PRESSURE: 122 MMHG

## 2019-12-19 DIAGNOSIS — Z34.03 ENCOUNTER FOR SUPERVISION OF NORMAL FIRST PREGNANCY IN THIRD TRIMESTER: Primary | ICD-10-CM

## 2019-12-19 NOTE — PROGRESS NOTES
The patient came in today for group prenatal education. This is the number 8 session. I asked if they had any questions about breastfeeding which was discussed in the last session. We discussed birth control options. We discussed the birth control options that are available for breastfeeding moms. She does plan to breastfeed. She used the nexplanon in the past. She plans to get a nexplanon placed postpartum.  One patient asked what to bring in her bag to bring to the hospital. We discussed what is needed for patient and for the baby. I also told them to bring what they plan to use for comfort measures such as lotions, oils, essential oils, etc.  We also discussed the group B swab test. All questions were answered today. We will discuss postpartum depression at the next appointment on January 2. She has an appointment with Dr. Colon tomorrow.

## 2019-12-20 ENCOUNTER — ROUTINE PRENATAL (OUTPATIENT)
Dept: OBSTETRICS AND GYNECOLOGY | Facility: CLINIC | Age: 24
End: 2019-12-20

## 2019-12-20 VITALS — WEIGHT: 219 LBS | BODY MASS INDEX: 30.56 KG/M2 | SYSTOLIC BLOOD PRESSURE: 118 MMHG | DIASTOLIC BLOOD PRESSURE: 65 MMHG

## 2019-12-20 DIAGNOSIS — O21.0 HYPEREMESIS GRAVIDARUM: ICD-10-CM

## 2019-12-20 DIAGNOSIS — Z3A.34 34 WEEKS GESTATION OF PREGNANCY: ICD-10-CM

## 2019-12-20 DIAGNOSIS — O60.03 PRETERM LABOR IN THIRD TRIMESTER WITHOUT DELIVERY: ICD-10-CM

## 2019-12-20 DIAGNOSIS — M54.50 LOW BACK PAIN DURING PREGNANCY, SECOND TRIMESTER: ICD-10-CM

## 2019-12-20 DIAGNOSIS — Z67.91 RH NEGATIVE STATUS DURING PREGNANCY IN SECOND TRIMESTER: Primary | ICD-10-CM

## 2019-12-20 DIAGNOSIS — O26.892 LOW BACK PAIN DURING PREGNANCY, SECOND TRIMESTER: ICD-10-CM

## 2019-12-20 DIAGNOSIS — O26.892 RH NEGATIVE STATUS DURING PREGNANCY IN SECOND TRIMESTER: Primary | ICD-10-CM

## 2019-12-20 PROCEDURE — 0502F SUBSEQUENT PRENATAL CARE: CPT | Performed by: OBSTETRICS & GYNECOLOGY

## 2019-12-20 RX ORDER — CYCLOBENZAPRINE HCL 10 MG
10 TABLET ORAL EVERY 8 HOURS PRN
Qty: 30 TABLET | Refills: 0 | Status: SHIPPED | OUTPATIENT
Start: 2019-12-20 | End: 2020-02-05

## 2019-12-22 NOTE — PROGRESS NOTES
CC: Prenatal visit    Millie Quevedo is a 24 y.o.  at 35w0d.  Doing well.  No complaints.  Denies contractions, LOF, or VB.  Reports good FM.  States that she continues to have lower pelvic pain, does state that Flexeril was helping but has run out.    /65   Wt 99.3 kg (219 lb)   LMP 2019 (Exact Date)   BMI 30.56 kg/m²     Fundal Height (cm): 35 cm  Fetal Heart Rate: 145     Problems (from 19 to present)     No problems associated with this episode.          A/P: Millie Quevedo is a 24 y.o.  at 35w0d.  Doing well with appropriately progressing pregnancy.  Refill Flexeril today.  Patient to return to see me in 2 weeks, sooner as needed.  Fetal kick count and  labor precautions given.  - RTC in 2 weeks     Diagnosis Plan   1. Rh negative status during pregnancy in second trimester     2. Hyperemesis gravidarum     3. Low back pain during pregnancy, second trimester     4.  labor in third trimester without delivery     5. 34 weeks gestation of pregnancy       Tyshawn Colon DO  2019  11:16 PM

## 2019-12-28 ENCOUNTER — HOSPITAL ENCOUNTER (OUTPATIENT)
Facility: HOSPITAL | Age: 24
Discharge: HOME | End: 2019-12-28
Attending: OBSTETRICS & GYNECOLOGY | Admitting: OBSTETRICS & GYNECOLOGY

## 2019-12-28 VITALS
SYSTOLIC BLOOD PRESSURE: 127 MMHG | OXYGEN SATURATION: 94 % | HEART RATE: 85 BPM | TEMPERATURE: 97.9 F | DIASTOLIC BLOOD PRESSURE: 81 MMHG | RESPIRATION RATE: 16 BRPM

## 2019-12-28 LAB
BACTERIA UR QL AUTO: ABNORMAL /HPF
BILIRUB UR QL STRIP: NEGATIVE
CANDIDA ALBICANS: NEGATIVE
CLARITY UR: ABNORMAL
COD CRY URNS QL: ABNORMAL /HPF
COLOR UR: ABNORMAL
GARDNERELLA VAGINALIS: NEGATIVE
GLUCOSE UR STRIP-MCNC: NEGATIVE MG/DL
HGB UR QL STRIP.AUTO: NEGATIVE
HYALINE CASTS UR QL AUTO: ABNORMAL /LPF
KETONES UR QL STRIP: NEGATIVE
LEUKOCYTE ESTERASE UR QL STRIP.AUTO: ABNORMAL
MUCOUS THREADS URNS QL MICRO: ABNORMAL /HPF
NITRITE UR QL STRIP: NEGATIVE
PH UR STRIP.AUTO: 6.5 [PH] (ref 5–9)
PROT UR QL STRIP: ABNORMAL
RBC # UR: ABNORMAL /HPF
REF LAB TEST METHOD: ABNORMAL
SP GR UR STRIP: 1.02 (ref 1–1.03)
SQUAMOUS #/AREA URNS HPF: ABNORMAL /HPF
T VAGINALIS DNA VAG QL PROBE+SIG AMP: NEGATIVE
UROBILINOGEN UR QL STRIP: ABNORMAL
WBC UR QL AUTO: ABNORMAL /HPF

## 2019-12-28 PROCEDURE — 81001 URINALYSIS AUTO W/SCOPE: CPT | Performed by: OBSTETRICS & GYNECOLOGY

## 2019-12-28 PROCEDURE — 59025 FETAL NON-STRESS TEST: CPT | Performed by: OBSTETRICS & GYNECOLOGY

## 2019-12-28 PROCEDURE — G0463 HOSPITAL OUTPT CLINIC VISIT: HCPCS

## 2019-12-28 PROCEDURE — 87480 CANDIDA DNA DIR PROBE: CPT | Performed by: OBSTETRICS & GYNECOLOGY

## 2019-12-28 PROCEDURE — 59025 FETAL NON-STRESS TEST: CPT

## 2019-12-28 PROCEDURE — 87510 GARDNER VAG DNA DIR PROBE: CPT | Performed by: OBSTETRICS & GYNECOLOGY

## 2019-12-28 PROCEDURE — 87660 TRICHOMONAS VAGIN DIR PROBE: CPT | Performed by: OBSTETRICS & GYNECOLOGY

## 2019-12-28 RX ORDER — ACETAMINOPHEN 500 MG
TABLET ORAL
Status: COMPLETED
Start: 2019-12-28 | End: 2019-12-28

## 2019-12-28 RX ORDER — ACETAMINOPHEN 500 MG
1000 TABLET ORAL ONCE
Status: COMPLETED | OUTPATIENT
Start: 2019-12-28 | End: 2019-12-28

## 2019-12-28 RX ADMIN — Medication 1000 MG: at 21:36

## 2019-12-28 RX ADMIN — ACETAMINOPHEN 1000 MG: 500 TABLET ORAL at 21:36

## 2019-12-29 NOTE — NON STRESS TEST
Millie Quevedo, a  at 36w0d with an DELFINO of 2020, by Last Menstrual Period, was seen at [facility] LABOR DELIVERY for a nonstress test.    Chief Complaint   Patient presents with   • Contractions     PAtient c/o UC every 5-7 minutes for the past few hours biut has had UC for the past few days.  +FM, -LOF, -VB       Patient Active Problem List   Diagnosis   • Rh negative status during pregnancy in second trimester   • Hyperemesis gravidarum   • Low back pain during pregnancy, second trimester   • Vaginal discharge   •  labor   •  labor in third trimester       Start Time:   Stop Time:     Interpretation A  Nonstress Test Interpretation A: Reactive (19 : Keeley Bray RN)  Comments A: reviewed with Dr. Barrera (19 : Keeley Bray, RN)        UA and Vag panel completed

## 2020-01-02 ENCOUNTER — ROUTINE PRENATAL (OUTPATIENT)
Dept: OBSTETRICS AND GYNECOLOGY | Facility: CLINIC | Age: 25
End: 2020-01-02

## 2020-01-02 VITALS — BODY MASS INDEX: 30.7 KG/M2 | WEIGHT: 220 LBS

## 2020-01-02 DIAGNOSIS — Z34.03 ENCOUNTER FOR SUPERVISION OF NORMAL FIRST PREGNANCY IN THIRD TRIMESTER: Primary | ICD-10-CM

## 2020-01-02 NOTE — PROGRESS NOTES
Patient is here for group prenatal education. We discussed infant care and watched a video. 90 minutes were spent in group. . She is planning to breastfeed and has her breastpump. She is signing up for the breastfeeding class of Lilliam Harvey which will be Tuesday January 7. She plans to use Nexplanon after delivery for contraception. She has an appointment scheduled with Dr. Colon tomorrow. All questions were answered at this time.

## 2020-01-03 ENCOUNTER — ROUTINE PRENATAL (OUTPATIENT)
Dept: OBSTETRICS AND GYNECOLOGY | Facility: CLINIC | Age: 25
End: 2020-01-03

## 2020-01-03 VITALS — BODY MASS INDEX: 30.95 KG/M2 | WEIGHT: 221.8 LBS | SYSTOLIC BLOOD PRESSURE: 118 MMHG | DIASTOLIC BLOOD PRESSURE: 72 MMHG

## 2020-01-03 DIAGNOSIS — Z3A.36 36 WEEKS GESTATION OF PREGNANCY: Primary | ICD-10-CM

## 2020-01-03 DIAGNOSIS — O26.892 RH NEGATIVE STATUS DURING PREGNANCY IN SECOND TRIMESTER: ICD-10-CM

## 2020-01-03 DIAGNOSIS — O21.9 NAUSEA AND VOMITING DURING PREGNANCY PRIOR TO 22 WEEKS GESTATION: ICD-10-CM

## 2020-01-03 DIAGNOSIS — O21.0 HYPEREMESIS GRAVIDARUM: ICD-10-CM

## 2020-01-03 DIAGNOSIS — Z67.91 RH NEGATIVE STATUS DURING PREGNANCY IN SECOND TRIMESTER: ICD-10-CM

## 2020-01-03 PROBLEM — O60.00 PRETERM LABOR: Status: RESOLVED | Noted: 2019-12-03 | Resolved: 2020-01-03

## 2020-01-03 PROBLEM — O60.03 PRETERM LABOR IN THIRD TRIMESTER: Status: RESOLVED | Noted: 2019-12-03 | Resolved: 2020-01-03

## 2020-01-03 PROCEDURE — 0502F SUBSEQUENT PRENATAL CARE: CPT | Performed by: OBSTETRICS & GYNECOLOGY

## 2020-01-03 RX ORDER — PROMETHAZINE HYDROCHLORIDE 25 MG/1
12.5 TABLET ORAL EVERY 8 HOURS PRN
Qty: 30 TABLET | Refills: 1 | Status: SHIPPED | OUTPATIENT
Start: 2020-01-03 | End: 2020-02-05

## 2020-01-03 NOTE — PROGRESS NOTES
CC: Prenatal visit    Millie Quevedo is a 24 y.o.  at 36w6d.  Doing well.  No complaints.  Denies contractions, LOF, or VB.  Reports good FM.  Still having pelvic pain.  Has stopped Zofran pump.  Needs refill on Phenergan.    /72   Wt 101 kg (221 lb 12.8 oz)   LMP 2019 (Exact Date)   BMI 30.95 kg/m²   Fundal Height (cm): 36 cm  Fetal Heart Rate: 145     Problems (from 19 to present)     No problems associated with this episode.          A/P: Millie Quevedo is a 24 y.o.  at 36w6d.  Doing well with appropriately progressing pregnancy induction of labor on .  Refill Phenergan.  Fetal kick count  labor precautions given.  Patient to return in 1 week, sooner as needed.  - RTC in 1 weeks     Diagnosis Plan   1. 36 weeks gestation of pregnancy     2. Rh negative status during pregnancy in second trimester     3. Hyperemesis gravidarum       Tyshawn Colon DO  1/3/2020  8:55 AM

## 2020-01-06 ENCOUNTER — ANESTHESIA EVENT (OUTPATIENT)
Dept: LABOR AND DELIVERY | Facility: HOSPITAL | Age: 25
End: 2020-01-06

## 2020-01-06 ENCOUNTER — HOSPITAL ENCOUNTER (INPATIENT)
Facility: HOSPITAL | Age: 25
LOS: 2 days | Discharge: HOME | End: 2020-01-08
Attending: OBSTETRICS & GYNECOLOGY | Admitting: FAMILY MEDICINE

## 2020-01-06 ENCOUNTER — ANESTHESIA (OUTPATIENT)
Dept: LABOR AND DELIVERY | Facility: HOSPITAL | Age: 25
End: 2020-01-06

## 2020-01-06 PROBLEM — M54.50 LOW BACK PAIN DURING PREGNANCY, SECOND TRIMESTER: Status: RESOLVED | Noted: 2019-10-03 | Resolved: 2020-01-06

## 2020-01-06 PROBLEM — O21.0 HYPEREMESIS GRAVIDARUM: Status: RESOLVED | Noted: 2019-08-19 | Resolved: 2020-01-06

## 2020-01-06 PROBLEM — Z37.9 NORMAL LABOR: Status: ACTIVE | Noted: 2020-01-06

## 2020-01-06 PROBLEM — O26.892 LOW BACK PAIN DURING PREGNANCY, SECOND TRIMESTER: Status: RESOLVED | Noted: 2019-10-03 | Resolved: 2020-01-06

## 2020-01-06 PROBLEM — N89.8 VAGINAL DISCHARGE: Status: RESOLVED | Noted: 2019-12-02 | Resolved: 2020-01-06

## 2020-01-06 LAB
ABO GROUP BLD: NORMAL
AMPHET+METHAMPHET UR QL: NEGATIVE
AMPHETAMINES UR QL: NEGATIVE
BARBITURATES UR QL SCN: NEGATIVE
BENZODIAZ UR QL SCN: NEGATIVE
BLD GP AB SCN SERPL QL: NEGATIVE
BUPRENORPHINE SERPL-MCNC: NEGATIVE NG/ML
CANNABINOIDS SERPL QL: POSITIVE
COCAINE UR QL: NEGATIVE
DEPRECATED RDW RBC AUTO: 44.2 FL (ref 37–54)
ERYTHROCYTE [DISTWIDTH] IN BLOOD BY AUTOMATED COUNT: 13.9 % (ref 12.3–15.4)
HCT VFR BLD AUTO: 35.2 % (ref 34–46.6)
HGB BLD-MCNC: 11.6 G/DL (ref 12–15.9)
Lab: NORMAL
Lab: NORMAL
MCH RBC QN AUTO: 28.9 PG (ref 26.6–33)
MCHC RBC AUTO-ENTMCNC: 33 G/DL (ref 31.5–35.7)
MCV RBC AUTO: 87.6 FL (ref 79–97)
METHADONE UR QL SCN: NEGATIVE
OPIATES UR QL: NEGATIVE
OXYCODONE UR QL SCN: NEGATIVE
PCP UR QL SCN: NEGATIVE
PLATELET # BLD AUTO: 257 10*3/MM3 (ref 140–450)
PMV BLD AUTO: 10.8 FL (ref 6–12)
PROPOXYPH UR QL: NEGATIVE
RBC # BLD AUTO: 4.02 10*6/MM3 (ref 3.77–5.28)
RH BLD: NEGATIVE
T&S EXPIRATION DATE: NORMAL
TRICYCLICS UR QL SCN: NEGATIVE
WBC NRBC COR # BLD: 11.86 10*3/MM3 (ref 3.4–10.8)

## 2020-01-06 PROCEDURE — 86900 BLOOD TYPING SEROLOGIC ABO: CPT | Performed by: OBSTETRICS & GYNECOLOGY

## 2020-01-06 PROCEDURE — G0480 DRUG TEST DEF 1-7 CLASSES: HCPCS | Performed by: OBSTETRICS & GYNECOLOGY

## 2020-01-06 PROCEDURE — 59400 OBSTETRICAL CARE: CPT | Performed by: FAMILY MEDICINE

## 2020-01-06 PROCEDURE — 80307 DRUG TEST PRSMV CHEM ANLYZR: CPT | Performed by: OBSTETRICS & GYNECOLOGY

## 2020-01-06 PROCEDURE — 85027 COMPLETE CBC AUTOMATED: CPT | Performed by: OBSTETRICS & GYNECOLOGY

## 2020-01-06 PROCEDURE — C1755 CATHETER, INTRASPINAL: HCPCS | Performed by: ANESTHESIOLOGY

## 2020-01-06 PROCEDURE — 86850 RBC ANTIBODY SCREEN: CPT | Performed by: OBSTETRICS & GYNECOLOGY

## 2020-01-06 PROCEDURE — 25010000002 FENTANYL CITRATE (PF) 250 MCG/5ML SOLUTION: Performed by: NURSE ANESTHETIST, CERTIFIED REGISTERED

## 2020-01-06 PROCEDURE — 86901 BLOOD TYPING SEROLOGIC RH(D): CPT | Performed by: OBSTETRICS & GYNECOLOGY

## 2020-01-06 PROCEDURE — 25010000003 PENICILLIN G POTASSIUM PER 600000 UNITS: Performed by: OBSTETRICS & GYNECOLOGY

## 2020-01-06 RX ORDER — BUPIVACAINE HYDROCHLORIDE 2.5 MG/ML
INJECTION, SOLUTION EPIDURAL; INFILTRATION; INTRACAUDAL AS NEEDED
Status: DISCONTINUED | OUTPATIENT
Start: 2020-01-06 | End: 2020-01-06 | Stop reason: SURG

## 2020-01-06 RX ORDER — LIDOCAINE HYDROCHLORIDE AND EPINEPHRINE 15; 5 MG/ML; UG/ML
INJECTION, SOLUTION EPIDURAL AS NEEDED
Status: DISCONTINUED | OUTPATIENT
Start: 2020-01-06 | End: 2020-01-06 | Stop reason: SURG

## 2020-01-06 RX ORDER — DOCUSATE SODIUM 100 MG/1
100 CAPSULE, LIQUID FILLED ORAL DAILY
Status: DISCONTINUED | OUTPATIENT
Start: 2020-01-07 | End: 2020-01-08 | Stop reason: HOSPADM

## 2020-01-06 RX ORDER — OXYTOCIN/0.9 % SODIUM CHLORIDE 30/500 ML
2-20 PLASTIC BAG, INJECTION (ML) INTRAVENOUS
Status: DISCONTINUED | OUTPATIENT
Start: 2020-01-06 | End: 2020-01-06 | Stop reason: HOSPADM

## 2020-01-06 RX ORDER — LIDOCAINE HYDROCHLORIDE 10 MG/ML
5 INJECTION, SOLUTION EPIDURAL; INFILTRATION; INTRACAUDAL; PERINEURAL AS NEEDED
Status: DISCONTINUED | OUTPATIENT
Start: 2020-01-06 | End: 2020-01-06 | Stop reason: HOSPADM

## 2020-01-06 RX ORDER — PROMETHAZINE HYDROCHLORIDE 25 MG/ML
12.5 INJECTION, SOLUTION INTRAMUSCULAR; INTRAVENOUS EVERY 4 HOURS PRN
Status: DISCONTINUED | OUTPATIENT
Start: 2020-01-06 | End: 2020-01-08 | Stop reason: HOSPADM

## 2020-01-06 RX ORDER — SODIUM CHLORIDE 0.9 % (FLUSH) 0.9 %
3 SYRINGE (ML) INJECTION EVERY 12 HOURS SCHEDULED
Status: DISCONTINUED | OUTPATIENT
Start: 2020-01-06 | End: 2020-01-06 | Stop reason: HOSPADM

## 2020-01-06 RX ORDER — LANOLIN 100 %
OINTMENT (GRAM) TOPICAL
Status: DISCONTINUED | OUTPATIENT
Start: 2020-01-06 | End: 2020-01-08 | Stop reason: HOSPADM

## 2020-01-06 RX ORDER — CARBOPROST TROMETHAMINE 250 UG/ML
250 INJECTION, SOLUTION INTRAMUSCULAR ONCE
Status: DISCONTINUED | OUTPATIENT
Start: 2020-01-06 | End: 2020-01-08 | Stop reason: HOSPADM

## 2020-01-06 RX ORDER — SODIUM CHLORIDE, SODIUM LACTATE, POTASSIUM CHLORIDE, CALCIUM CHLORIDE 600; 310; 30; 20 MG/100ML; MG/100ML; MG/100ML; MG/100ML
125 INJECTION, SOLUTION INTRAVENOUS CONTINUOUS
Status: DISCONTINUED | OUTPATIENT
Start: 2020-01-06 | End: 2020-01-08 | Stop reason: HOSPADM

## 2020-01-06 RX ORDER — ACETAMINOPHEN 500 MG
1000 TABLET ORAL EVERY 6 HOURS
Status: DISCONTINUED | OUTPATIENT
Start: 2020-01-06 | End: 2020-01-08 | Stop reason: HOSPADM

## 2020-01-06 RX ORDER — BISACODYL 10 MG
10 SUPPOSITORY, RECTAL RECTAL DAILY PRN
Status: DISCONTINUED | OUTPATIENT
Start: 2020-01-07 | End: 2020-01-08 | Stop reason: HOSPADM

## 2020-01-06 RX ORDER — SODIUM CHLORIDE 0.9 % (FLUSH) 0.9 %
10 SYRINGE (ML) INJECTION AS NEEDED
Status: DISCONTINUED | OUTPATIENT
Start: 2020-01-06 | End: 2020-01-06 | Stop reason: HOSPADM

## 2020-01-06 RX ORDER — OXYTOCIN/0.9 % SODIUM CHLORIDE 30/500 ML
PLASTIC BAG, INJECTION (ML) INTRAVENOUS
Status: DISPENSED
Start: 2020-01-06 | End: 2020-01-07

## 2020-01-06 RX ORDER — MISOPROSTOL 200 UG/1
600 TABLET ORAL ONCE
Status: DISCONTINUED | OUTPATIENT
Start: 2020-01-06 | End: 2020-01-08 | Stop reason: HOSPADM

## 2020-01-06 RX ORDER — SODIUM CHLORIDE 0.9 % (FLUSH) 0.9 %
1-10 SYRINGE (ML) INJECTION AS NEEDED
Status: DISCONTINUED | OUTPATIENT
Start: 2020-01-06 | End: 2020-01-08 | Stop reason: HOSPADM

## 2020-01-06 RX ORDER — PRENATAL VIT/IRON FUM/FOLIC AC 27MG-0.8MG
1 TABLET ORAL DAILY
Status: DISCONTINUED | OUTPATIENT
Start: 2020-01-07 | End: 2020-01-08 | Stop reason: HOSPADM

## 2020-01-06 RX ORDER — OXYTOCIN/0.9 % SODIUM CHLORIDE 30/500 ML
85 PLASTIC BAG, INJECTION (ML) INTRAVENOUS ONCE
Status: DISCONTINUED | OUTPATIENT
Start: 2020-01-06 | End: 2020-01-08 | Stop reason: HOSPADM

## 2020-01-06 RX ORDER — FENTANYL CITRATE 50 UG/ML
INJECTION, SOLUTION INTRAMUSCULAR; INTRAVENOUS AS NEEDED
Status: DISCONTINUED | OUTPATIENT
Start: 2020-01-06 | End: 2020-01-06 | Stop reason: SURG

## 2020-01-06 RX ORDER — OXYTOCIN/0.9 % SODIUM CHLORIDE 30/500 ML
650 PLASTIC BAG, INJECTION (ML) INTRAVENOUS ONCE
Status: DISCONTINUED | OUTPATIENT
Start: 2020-01-06 | End: 2020-01-08 | Stop reason: HOSPADM

## 2020-01-06 RX ORDER — IBUPROFEN 800 MG/1
800 TABLET ORAL EVERY 8 HOURS SCHEDULED
Status: DISCONTINUED | OUTPATIENT
Start: 2020-01-06 | End: 2020-01-08 | Stop reason: HOSPADM

## 2020-01-06 RX ADMIN — OXYTOCIN 2 MILLI-UNITS/MIN: 10 INJECTION, SOLUTION INTRAMUSCULAR; INTRAVENOUS at 06:15

## 2020-01-06 RX ADMIN — LIDOCAINE HYDROCHLORIDE AND EPINEPHRINE 3 ML: 15; 5 INJECTION, SOLUTION EPIDURAL at 08:54

## 2020-01-06 RX ADMIN — SODIUM CHLORIDE 5 MILLION UNITS: 9 INJECTION, SOLUTION INTRAVENOUS at 06:10

## 2020-01-06 RX ADMIN — Medication 8 ML/HR: at 08:59

## 2020-01-06 RX ADMIN — BUPIVACAINE HYDROCHLORIDE 5 ML: 2.5 INJECTION, SOLUTION EPIDURAL; INFILTRATION; INTRACAUDAL; PERINEURAL at 15:17

## 2020-01-06 RX ADMIN — IBUPROFEN 800 MG: 800 TABLET ORAL at 22:18

## 2020-01-06 RX ADMIN — SODIUM CHLORIDE 3 MILLION UNITS: 9 INJECTION, SOLUTION INTRAVENOUS at 14:42

## 2020-01-06 RX ADMIN — SODIUM CHLORIDE, POTASSIUM CHLORIDE, SODIUM LACTATE AND CALCIUM CHLORIDE 125 ML/HR: 600; 310; 30; 20 INJECTION, SOLUTION INTRAVENOUS at 08:43

## 2020-01-06 RX ADMIN — FENTANYL CITRATE 250 MCG: 50 INJECTION, SOLUTION INTRAMUSCULAR; INTRAVENOUS at 08:59

## 2020-01-06 RX ADMIN — SODIUM CHLORIDE 3 MILLION UNITS: 9 INJECTION, SOLUTION INTRAVENOUS at 10:35

## 2020-01-06 RX ADMIN — SODIUM CHLORIDE 3 MILLION UNITS: 9 INJECTION, SOLUTION INTRAVENOUS at 17:44

## 2020-01-06 RX ADMIN — Medication 12 ML/HR: at 15:18

## 2020-01-06 NOTE — PLAN OF CARE
Problem: Patient Care Overview  Goal: Plan of Care Review  Outcome: Ongoing (interventions implemented as appropriate)  Flowsheets  Taken 1/6/2020 1730  Progress: improving  Outcome Summary: Pt started pushing at 1645. Plans for skin to skin and breastfeeding. Tolerating pushing well. Epidural infusing. GBS +.  Taken 1/6/2020 1208  Plan of Care Reviewed With: patient;spouse  Goal: Individualization and Mutuality  Outcome: Ongoing (interventions implemented as appropriate)  Flowsheets (Taken 1/6/2020 1730)  Patient Specific Goals (Include Timeframe): Delivery baby vaginally by 2100.  How to Address Anxieties/Fears: Explain things in a way she can understand  Patient Specific Interventions: Epidural for pain, encouragement with position, pitocin titration  What Information Would Help Us Give You More Personalized Care?: First baby  How Would You and/or Your Support Person Like to Participate in Your Care?: Spouse at bedside  What Anxieties, Fears, Concerns, or Questions Do You Have About Your Care?: Fear of the unknown  Patient Specific Preferences: Epidural; Breastfeeding  Goal: Discharge Needs Assessment  Outcome: Ongoing (interventions implemented as appropriate)  Flowsheets  Taken 1/6/2020 0534 by Shantal Arthur, RN  Equipment Needed After Discharge: none  Anticipated Changes Related to Illness: none  Transportation Anticipated: car, drives self  Transportation Concerns: car, none  Concerns to be Addressed: no discharge needs identified  Readmission Within the Last 30 Days: no previous admission in last 30 days  Patient/Family Anticipated Services at Transition: none  Patient/Family Anticipates Transition to: home  Taken 1/6/2020 0535 by Shantal Arthur RN  Equipment Currently Used at Home: none  Goal: Interprofessional Rounds/Family Conf  Outcome: Ongoing (interventions implemented as appropriate)  Flowsheets (Taken 1/6/2020 1730)  Participants: nursing; patient; physician; family     Problem: Labor  (Cervical Ripen, Induct, Augment) (Adult,Obstetrics,Pediatric)  Goal: Signs and Symptoms of Listed Potential Problems Will be Absent, Minimized or Managed (Labor)  Outcome: Ongoing (interventions implemented as appropriate)  Flowsheets (Taken 1/6/2020 1730)  Problems Assessed (Labor): all  Problems Present (Labor): none

## 2020-01-06 NOTE — H&P
[facility]  Obstetric History and Physical    Chief Complaint   Patient presents with   • Leaking Fluid     Pr reports leaking of fluids at 0230 tonight.  +fm, -vb           Patient is a 24 y.o. female  currently at 37w2d, who presents with spontaneous rupture of membranes about 2:00 this morning clear.  Presents to hospital with gross rupture membranes about 430.  She is not having any regular uterine contractions at this point.  There is no vaginal bleeding.    Her prenatal care is through [facility]     Obstetric History   #: 1, Date: None, Sex: None, Weight: None, GA: None, Delivery: None, Apgar1: None, Apgar5: None, Living: None, Birth Comments: None       The following portions of the patients history were reviewed and updated as appropriate: current medications, allergies, past medical history, past surgical history, past family history, past social history and problem list .       Prenatal Information:  Prenatal Results     POC Urine Glucose/Protein     Test Value Reference Range Date Time    Urine Glucose        Urine Protein              Initial Prenatal Labs     Test Value Reference Range Date Time    Hemoglobin 12.5 g/dL 12.0 - 15.9 19 0525      12.9 g/dL 12.0 - 15.9 19 0557      13.7 g/dL 12.0 - 15.9 19 1926      12.9 g/dL 12.0 - 15.9 19 0150      13.1 g/dL 12.0 - 15.9 19 1017      12.9 g/dL 12.0 - 15.9 19 2233    Hematocrit 36.9 % 34.0 - 46.6 19 0525      37.5 % 34.0 - 46.6 19 0557      41.0 % 34.0 - 46.6 19 1926      37.6 % 34.0 - 46.6 19 0150      39.6 % 34.0 - 46.6 19 1017      38.9 % 34.0 - 46.6 19 2233    Platelets 257 10*3/mm3 140 - 450 20 0525      256 10*3/mm3 140 - 450 19 1545      272 10*3/mm3 140 - 450 19 2140      230 10*3/mm3 140 - 450 10/31/19 0953      246 10*3/mm3 140 - 450 19 0525      234 10*3/mm3 140 - 450 19 0557      251 10*3/mm3 140 - 450 19 1926       250 10*3/mm3 140 - 450 07/21/19 0150      289 10*3/mm3 140 - 450 06/13/19 1017      297 10*3/mm3 140 - 450 06/04/19 2233    Rubella IgG Positive   06/13/19 1017    Hepatitis B SAg Non-Reactive  Non-Reactive 06/13/19 1017    Hepatitis C Ab Non-Reactive  Non-Reactive 06/13/19 1017    RPR Non-Reactive  Non-Reactive 06/13/19 1017    ABO O   12/03/19 1918    Rh Negative   12/03/19 1918    Antibody Screen Positive   06/13/19 1017    HIV Non-Reactive  Non-Reactive 06/13/19 1017    Urine Culture >100,000 CFU/mL Mixed Raven Isolated   12/10/19 2251      No growth   12/03/19 1808      25,000 CFU/mL Mixed Raven Isolated   06/13/19 1017    Gonorrhea Negative  Negative 06/13/19 1017      Negative  Negative 05/17/19 1037    Chlamydia Negative  Negative 06/13/19 1017      Negative  Negative 05/17/19 1037    TSH              2nd and 3rd Trimester     Test Value Reference Range Date Time    Hemoglobin (repeated) 11.6 g/dL 12.0 - 15.9 01/06/20 0525      12.2 g/dL 12.0 - 15.9 12/02/19 1545      12.5 g/dL 12.0 - 15.9 11/26/19 2140      11.8 g/dL 12.0 - 15.9 10/31/19 0953    Hematocrit (repeated) 35.2 % 34.0 - 46.6 01/06/20 0525      36.3 % 34.0 - 46.6 12/02/19 1545      36.9 % 34.0 - 46.6 11/26/19 2140      34.4 % 34.0 - 46.6 10/31/19 0953     mg/dL 60 - 140 10/31/19 0953    Antibody Screen (repeated) Positive   12/03/19 1918      Negative   10/31/19 0953    GTT Fasting        GTT 1 Hr        GTT 2 Hr        GTT 3 Hr        Group B Strep Positive  Negative 12/03/19 1808          Drug Screening     Test Value Reference Range Date Time    Amphetamine Screen Negative  Negative 01/06/20 0526      Negative  Negative 12/03/19 1808      Negative  Negative 08/07/19 0826    Barbiturate Screen Negative  Negative 01/06/20 0526      Positive  Negative 12/03/19 1808      Negative  Negative 08/07/19 0826      Negative  Negative 06/13/19 1017    Benzodiazepine Screen Negative  Negative 01/06/20 0526      Negative  Negative 12/03/19 1808       Negative  Negative 08/07/19 0826      Negative  Negative 06/13/19 1017    Methadone Screen Negative  Negative 01/06/20 0526      Negative  Negative 12/03/19 1808      Negative  Negative 08/07/19 0826      Negative  Negative 06/13/19 1017    Phencyclidine Screen Negative  Negative 01/06/20 0526      Negative  Negative 12/03/19 1808      Negative  Negative 08/07/19 0826    Opiates Screen Negative  Negative 01/06/20 0526      Negative  Negative 12/03/19 1808      Negative  Negative 08/07/19 0826      Negative  Negative 06/13/19 1017    THC Screen Positive  Negative 01/06/20 0526      Positive  Negative 12/03/19 1808      Positive  Negative 08/07/19 0826      Positive  Negative 06/13/19 1017    Cocaine Screen Negative  Negative 01/06/20 0526      Negative  Negative 12/03/19 1808      Negative  Negative 08/07/19 0826      Negative  Negative 06/13/19 1017    Propoxyphene Screen Negative  Negative 01/06/20 0526      Negative  Negative 12/03/19 1808      Negative  Negative 08/07/19 0826    Buprenorphine Screen Negative  Negative 01/06/20 0526      Negative  Negative 12/03/19 1808      Negative  Negative 08/07/19 0826    Methamphetamine Screen Negative  Negative 01/06/20 0526      Negative  Negative 12/03/19 1808      Negative  Negative 08/07/19 0826    Oxycodone Screen Negative  Negative 01/06/20 0526      Negative  Negative 12/03/19 1808      Negative  Negative 08/07/19 0826      Negative  Negative 06/13/19 1017    Tricyclic Antidepressants Screen Negative  Negative 01/06/20 0526      Negative  Negative 12/03/19 1808      Negative  Negative 08/07/19 0826          Other (Risk screening)     Test Value Reference Range Date Time    Varicella IgG        Parvovirus IgG        CMV IgG        Cystic Fibrosis        Hemoglobin electrophoresis        NIPT        MSAFP-4        AFP (for NTD only)                  External Prenatal Results     Pregnancy Outside Results - Transcribed From Office Records - See Scanned Records For  Details     Test Value Date Time    Hgb 11.6 g/dL 01/06/20 0525      12.2 g/dL 12/02/19 1545      12.5 g/dL 11/26/19 2140      11.8 g/dL 10/31/19 0953      12.5 g/dL 08/19/19 0525      12.9 g/dL 08/07/19 0557      13.7 g/dL 08/06/19 1926      12.9 g/dL 07/21/19 0150      13.1 g/dL 06/13/19 1017      12.9 g/dL 06/04/19 2233    Hct 35.2 % 01/06/20 0525      36.3 % 12/02/19 1545      36.9 % 11/26/19 2140      34.4 % 10/31/19 0953      36.9 % 08/19/19 0525      37.5 % 08/07/19 0557      41.0 % 08/06/19 1926      37.6 % 07/21/19 0150      39.6 % 06/13/19 1017      38.9 % 06/04/19 2233    ABO O  12/03/19 1918    Rh Negative  12/03/19 1918    Antibody Screen Positive  12/03/19 1918      Negative  10/31/19 0953      Positive  06/13/19 1017    Glucose Fasting GTT       Glucose Tolerance Test 1 hour       Glucose Tolerance Test 3 hour       Gonorrhea (discrete) Negative  06/13/19 1017      Negative  05/17/19 1037    Chlamydia (discrete) Negative  06/13/19 1017      Negative  05/17/19 1037    RPR Non-Reactive  06/13/19 1017    VDRL       Syphilis Antibody       Rubella Positive  06/13/19 1017    HBsAg Non-Reactive  06/13/19 1017    Herpes Simplex Virus PCR       Herpes Simplex VIrus Culture       HIV Non-Reactive  06/13/19 1017    Hep C RNA Quant PCR       Hep C Antibody Non-Reactive  06/13/19 1017    AFP       Group B Strep Positive  12/03/19 1808    GBS Susceptibility to Clindamycin       GBS Susceptibility to Erythromycin       Fetal Fibronectin Negative  11/26/19 2214    Genetic Testing, Maternal Blood             Drug Screening     Test Value Date Time    Urine Drug Screen       Amphetamine Screen Negative  01/06/20 0526      Negative  12/03/19 1808      Negative  08/07/19 0826    Barbiturate Screen Negative  01/06/20 0526      Positive  12/03/19 1808      Negative  08/07/19 0826      Negative  06/13/19 1017    Benzodiazepine Screen Negative  01/06/20 0526      Negative  12/03/19 1808      Negative  08/07/19 0826       Negative  19 1017    Methadone Screen Negative  20 0526      Negative  19 1808      Negative  19 0826      Negative  19 1017    Phencyclidine Screen Negative  20 0526      Negative  19 1808      Negative  19 0826    Opiates Screen Negative  20 0526      Negative  19 1808      Negative  19 0826      Negative  19 1017    THC Screen Positive  20 0526      Positive  19 1808      Positive  19 0826      Positive  19 1017    Cocaine Screen       Propoxyphene Screen Negative  20 0526      Negative  19 1808      Negative  19 0826    Buprenorphine Screen Negative  20 0526      Negative  19 1808      Negative  19 0826    Methamphetamine Screen       Oxycodone Screen Negative  20 0526      Negative  19 1808      Negative  19 0826      Negative  19 1017    Tricyclic Antidepressants Screen Negative  20 0526      Negative  19 1808      Negative  19 0826                 Past OB History:     OB History    Para Term  AB Living   1 0 0 0 0 0   SAB TAB Ectopic Molar Multiple Live Births   0 0 0 0 0 0      # Outcome Date GA Lbr Ayush/2nd Weight Sex Delivery Anes PTL Lv   1 Current                 ALLERGIES:     Allergies   Allergen Reactions   • Aspirin Shortness Of Breath     TIGHT CHEST   • Codeine Shortness Of Breath, Itching and Rash   • Naproxen Shortness Of Breath     Tightness of chest   • Erythromycin Base Rash   • Latex Rash     Rash         Home Medications:     Prior to Admission medications    Medication Sig Start Date End Date Taking? Authorizing Provider   acetaminophen (TYLENOL) 500 MG tablet Take 500 mg by mouth Every 6 (Six) Hours As Needed for Mild Pain  (for headaches).   Yes Provider, MD Anushka   cyclobenzaprine (FLEXERIL) 10 MG tablet Take 1 tablet by mouth Every 8 (Eight) Hours As Needed for Muscle Spasms. 19 Yes  Tyshawn Colon DO   omeprazole OTC (PrilOSEC OTC) 20 MG EC tablet Take 1 tablet by mouth Daily. 11/14/19 11/13/20 Yes Tyshawn Colon DO   Prenatal Vit-Fe Fumarate-FA (PRENATAL VITAMIN PLUS LOW IRON) 27-1 MG tablet TAKE 1 TABLET BY MOUTH EVERY DAY 9/16/19  Yes Alicia Mckeon APRN   promethazine (PHENERGAN) 25 MG tablet Take 0.5 tablets by mouth Every 8 (Eight) Hours As Needed for Nausea or Vomiting. 1/3/20  Yes Tyshawn Colon DO   albuterol (PROVENTIL HFA;VENTOLIN HFA) 108 (90 BASE) MCG/ACT inhaler Inhale. As needed    Provider, MD Anushka       Past Medical History: Past Medical History:   Diagnosis Date   • Abnormal Pap smear of cervix    • Acute maxillary sinusitis    • Acute otitis externa    • Acute pharyngitis    • Acute tonsillitis    • Allergic asthma     IgE-mediated allergic asthma     • Allergic rhinitis    • Allergic rhinitis due to pollen    • Anxiety    • Asthma    • Chondromalacia of patella     left knee    • Common cold    • Cough    • Diarrhea    • Disorder of gallbladder     Probable biliary dyskinesia    • Epigastric pain    • Foot pain    • Gastroenteritis    • Generalized abdominal pain    • Headache    • History of colonoscopy 03/27/2013    Colon endoscopy 25324 (1)  -  Internal & external hemorrhoids found.   • History of esophagogastroduodenoscopy (EGD) 03/27/2013    w/ tube 18416 (1)  -  Normal esophagus. Gastritis in stomach. Biopsy taken. Normal duodenum. Biospy taken   • History of marijuana use    • HPV (human papilloma virus) infection    • IBS (irritable bowel syndrome)    • Influenza    • Irregular periods    • Irritable bowel syndrome    • Migraine    • Nausea    • Nausea and vomiting    • Osgood-Schlatter's disease    • Pain in throat    • Patellar tendonitis    • Right upper quadrant pain    • Streptococcal sore throat    • Temporomandibular joint disorder     WISDOM TEETH    • Upper respiratory infection    • Urgency of urination    • Urinary tract  infectious disease    • Varicella    • Viral gastroenteritis    • Vulvovaginitis       Past Surgical History Past Surgical History:   Procedure Laterality Date   • CHOLECYSTECTOMY  06/06/2013    Laparoscopic  -  laparoscopic cholecystectomy with intraoperative cholangiogram. Cholecystitis.   • INJECTION OF MEDICATION  01/08/2013    Toradol (1)   -  W. Sotomayor   • LAPAROSCOPIC CHOLECYSTECTOMY     • WISDOM TOOTH EXTRACTION        Family History: Family History   Adopted: Yes   Problem Relation Age of Onset   • Cancer Other    • Diabetes Other    • Heart disease Other    • Hypertension Other    • Stroke Other    • Lung disease Other    • Cholelithiasis Other    • Ulcers Other    • Other Other         Colon problems   • Ovarian cysts Mother    • Bipolar disorder Mother    • Irritable bowel syndrome Mother    • Ovarian cancer Mother    • No Known Problems Sister    • Emphysema Maternal Grandmother    • Heart attack Maternal Grandfather    • No Known Problems Sister    • No Known Problems Sister       Social History:  reports that she has never smoked. She has never used smokeless tobacco.   reports that she does not drink alcohol.   reports that she has current or past drug history. Drug: Marijuana.        Review of Systems                                                                                                                  Neuro no history of brain tumor    HENT no history of ear tumors    Eye no history of retinal tumors    Pulmonary no history of lung tumors    Cardiac no history of cardiac tumors    GI: No history of small bowel tumors    Musculoskeletal: No history of skeletal muscle tumors    Endocrine: No history of adrenal tumors    Lymphatic: No history of Hodgkin's disease    Renal: No history of renal cancer      Objective     Documented Vitals    01/06/20 0439 01/06/20 0519   BP: 131/84    Pulse: 97    Resp: 16    Temp: 98.1 °F (36.7 °C)    SpO2: 98%    Weight:  99.8 kg (220 lb)   Height:  180.3 cm  "(71\")          OBGyn Exam  Constitutional: Appears to be in no acute distress; Eyes: sclera normal; Endocrine system: thyroid palpate is normal; Pulmonary system: lungs clear; Cardiovascular system: heart regular rate and rhythm; Gastrointestinal system: abdomen soft nontender, active bowel sounds; Urologic system: CVA negative; Psychiatric: appropriate insight; Neurologic: gait within normal limits gross rupture membranes      Last Labs  Lab Results   Component Value Date    WBC 11.86 (H) 2020    RBC 4.02 2020    HGB 11.6 (L) 2020    HCT 35.2 2020    MCV 87.6 2020    MCH 28.9 2020    MCHC 33.0 2020    RDW 13.9 2020    RDWSD 44.2 2020    MPV 10.8 2020     2020        Lab Results   Component Value Date    GLUCOSE 78 2019    BUN 8 2019    CREATININE 0.47 (L) 2019     2019    K 3.6 2019     2019    CO2 19.0 (L) 2019    CALCIUM 9.2 2019    PROTEINTOT 7.6 2019    ALBUMIN 3.80 2019    ALT 11 2019    AST 13 2019    ALKPHOS 112 2019    BILITOT 0.3 2019    EGFRIFNONA >150 2019    GLOB 3.8 2019    AGRATIO 1.0 2019    BCR 17.0 2019    ANIONGAP 16.0 (H) 2019       No results found for: HCGQUAL      Assessment/Plan:  1. 24 y.o. [id number].  Presents with gross rupture membranes was occurred about 2:00.  Being a few contractions.  As active labor is not occurred will begin augmentation.  Group B strep negative prophylaxis begun.    Millie Quevedo and I have discussed pain goals for this hospitalization after reviewing her current clinical condition, medical history and prior pain experiences.  The goal is to keep her pain level 3.  To help achieve this, I plan to use multimodal labor pain management.       This document has been electronically signed by Memo Miranda MD on 2020 6:20 AM\    Please note that " portions of this note were completed with a voice recognition program.

## 2020-01-06 NOTE — PROGRESS NOTES
[facility] - OB Labor Progress Note  ?     S: Pt is a 24 y.o.  at 37w2d with Estimated Date of Delivery: 20 Pt comfortable with epidural.  Reports intermittent pelvic pressure.  ?  O:   Vitals:    20 1614   BP: 135/88   Pulse: (!) 121   Resp:    Temp:    SpO2:       Fetal Heart Tones: Baseline: 130 bpm, Variability: Moderate, Accelerations: 2 15x15 accels noted in 20 min and Decelerations: Variable: mild.  Category 1 tracing.   Uterine Activity: Frequency: Every 2-3 minutes    Cervical Exam: 10/100/-1 per RN    A/P: 24 y.o.  at 37w2d with Estimated Date of Delivery: 20 IOL for PROM  - laboring down        This document has been electronically signed by Neda Brasher MD on 2020 4:17 PM

## 2020-01-06 NOTE — ANESTHESIA PREPROCEDURE EVALUATION
Anesthesia Evaluation     NPO Solid Status: > 8 hours  NPO Liquid Status: < 2 hours           Airway   Mallampati: II  TM distance: >3 FB  Neck ROM: full  no difficulty expected  Dental - normal exam     Pulmonary - normal exam   (+) asthma,  Cardiovascular - normal exam        Neuro/Psych  (+) headaches,     GI/Hepatic/Renal/Endo      Musculoskeletal     (+) neck pain,   Abdominal    Substance History      OB/GYN    (+) Pregnant,         Other   arthritis,                      Anesthesia Plan    ASA 2     epidural       Anesthetic plan, all risks, benefits, and alternatives have been provided, discussed and informed consent has been obtained with: patient.

## 2020-01-06 NOTE — ANESTHESIA PROCEDURE NOTES
Labor Epidural      Patient location during procedure: OB  Performed By  CRNA: Calvin Solo CRNA  Preanesthetic Checklist  Completed: patient identified, site marked, surgical consent, pre-op evaluation, timeout performed, IV checked, risks and benefits discussed and monitors and equipment checked  Prep:  Pt Position:sitting  Sterile Tech:gloves, cap, gown, mask and sterile barrier  Prep:DuraPrep  Monitoring:blood pressure monitoring and continuous pulse oximetry  Epidural Block Procedure:  Approach:midline  Guidance:landmark technique and palpation technique  Location:L3-L4  Needle Type:Tuohy  Needle Gauge:17 G  Loss of Resistance Medium: air  Loss of Resistance: 9cm  Cath Depth at skin:14 cm  Paresthesia: none  Aspiration:negative  Test Dose:negative  Number of Attempts: 1  Post Assessment:  Dressing:occlusive dressing applied and secured with tape  Pt Tolerance:patient tolerated the procedure well with no apparent complications  Complications:no

## 2020-01-07 LAB
BASOPHILS # BLD AUTO: 0.03 10*3/MM3 (ref 0–0.2)
BASOPHILS NFR BLD AUTO: 0.2 % (ref 0–1.5)
DEPRECATED RDW RBC AUTO: 44.4 FL (ref 37–54)
EOSINOPHIL # BLD AUTO: 0.01 10*3/MM3 (ref 0–0.4)
EOSINOPHIL NFR BLD AUTO: 0.1 % (ref 0.3–6.2)
ERYTHROCYTE [DISTWIDTH] IN BLOOD BY AUTOMATED COUNT: 13.9 % (ref 12.3–15.4)
HCT VFR BLD AUTO: 33.5 % (ref 34–46.6)
HGB BLD-MCNC: 11 G/DL (ref 12–15.9)
IMM GRANULOCYTES # BLD AUTO: 0.16 10*3/MM3 (ref 0–0.05)
IMM GRANULOCYTES NFR BLD AUTO: 1 % (ref 0–0.5)
LYMPHOCYTES # BLD AUTO: 1.55 10*3/MM3 (ref 0.7–3.1)
LYMPHOCYTES NFR BLD AUTO: 9.6 % (ref 19.6–45.3)
MCH RBC QN AUTO: 28.9 PG (ref 26.6–33)
MCHC RBC AUTO-ENTMCNC: 32.8 G/DL (ref 31.5–35.7)
MCV RBC AUTO: 88.2 FL (ref 79–97)
MONOCYTES # BLD AUTO: 0.78 10*3/MM3 (ref 0.1–0.9)
MONOCYTES NFR BLD AUTO: 4.8 % (ref 5–12)
NEUTROPHILS # BLD AUTO: 13.57 10*3/MM3 (ref 1.7–7)
NEUTROPHILS NFR BLD AUTO: 84.3 % (ref 42.7–76)
NRBC BLD AUTO-RTO: 0 /100 WBC (ref 0–0.2)
PLATELET # BLD AUTO: 242 10*3/MM3 (ref 140–450)
PMV BLD AUTO: 10.8 FL (ref 6–12)
RBC # BLD AUTO: 3.8 10*6/MM3 (ref 3.77–5.28)
WBC NRBC COR # BLD: 16.1 10*3/MM3 (ref 3.4–10.8)

## 2020-01-07 PROCEDURE — 85025 COMPLETE CBC W/AUTO DIFF WBC: CPT | Performed by: FAMILY MEDICINE

## 2020-01-07 PROCEDURE — 99024 POSTOP FOLLOW-UP VISIT: CPT | Performed by: FAMILY MEDICINE

## 2020-01-07 PROCEDURE — C1755 CATHETER, INTRASPINAL: HCPCS

## 2020-01-07 PROCEDURE — 51703 INSERT BLADDER CATH COMPLEX: CPT

## 2020-01-07 RX ORDER — CARBOPROST TROMETHAMINE 250 UG/ML
250 INJECTION, SOLUTION INTRAMUSCULAR AS NEEDED
Status: DISCONTINUED | OUTPATIENT
Start: 2020-01-07 | End: 2020-01-08 | Stop reason: HOSPADM

## 2020-01-07 RX ORDER — METHYLERGONOVINE MALEATE 0.2 MG/ML
200 INJECTION INTRAVENOUS ONCE AS NEEDED
Status: DISCONTINUED | OUTPATIENT
Start: 2020-01-07 | End: 2020-01-08 | Stop reason: HOSPADM

## 2020-01-07 RX ORDER — MISOPROSTOL 200 UG/1
800 TABLET ORAL AS NEEDED
Status: DISCONTINUED | OUTPATIENT
Start: 2020-01-07 | End: 2020-01-08 | Stop reason: HOSPADM

## 2020-01-07 RX ADMIN — ACETAMINOPHEN 1000 MG: 500 TABLET ORAL at 22:30

## 2020-01-07 RX ADMIN — PRENATAL VIT W/ FE FUMARATE-FA TAB 27-0.8 MG 1 TABLET: 27-0.8 TAB at 10:26

## 2020-01-07 RX ADMIN — ACETAMINOPHEN 1000 MG: 500 TABLET ORAL at 11:14

## 2020-01-07 RX ADMIN — Medication: at 05:31

## 2020-01-07 RX ADMIN — IBUPROFEN 800 MG: 800 TABLET ORAL at 14:14

## 2020-01-07 RX ADMIN — ACETAMINOPHEN 1000 MG: 500 TABLET ORAL at 17:57

## 2020-01-07 RX ADMIN — DOCUSATE SODIUM 100 MG: 100 CAPSULE, LIQUID FILLED ORAL at 10:26

## 2020-01-07 RX ADMIN — BENZOCAINE AND LEVOMENTHOL: 200; 5 SPRAY TOPICAL at 05:31

## 2020-01-07 RX ADMIN — ACETAMINOPHEN 1000 MG: 500 TABLET ORAL at 01:45

## 2020-01-07 RX ADMIN — IBUPROFEN 800 MG: 800 TABLET ORAL at 05:30

## 2020-01-07 RX ADMIN — IBUPROFEN 800 MG: 800 TABLET ORAL at 22:30

## 2020-01-07 NOTE — LACTATION NOTE
Rounded on mom this morning. There were visitors in the room so education has not been completed. Mother states that breastfeeding is going well so far. I encouraged her to call out when she feeds baby again so that I can assess her latch and do education at that time.

## 2020-01-07 NOTE — ANESTHESIA POSTPROCEDURE EVALUATION
Patient: Millie Michael Emy    Procedure Summary     Date:  01/06/20 Room / Location:      Anesthesia Start:  0843 Anesthesia Stop:  2029    Procedure:  LABOR ANALGESIA Diagnosis:      Scheduled Providers:   Provider:  Will Lopez MD    Anesthesia Type:  epidural ASA Status:  2          Anesthesia Type: epidural    Vitals  Vitals Value Taken Time   /77 1/6/2020  8:15 PM   Temp 98.4 °F (36.9 °C) 1/6/2020  8:00 PM   Pulse 91 1/6/2020  8:25 PM   Resp 16 1/6/2020  8:15 PM   SpO2 94 % 1/6/2020  8:25 PM           Post Anesthesia Care and Evaluation    Patient location during evaluation: bedside  Patient participation: complete - patient participated  Level of consciousness: awake and alert  Pain score: 0  Pain management: adequate  Airway patency: patent  Anesthetic complications: No anesthetic complications  PONV Status: none  Cardiovascular status: acceptable  Respiratory status: acceptable  Hydration status: acceptable  Post Neuraxial Block status: No signs or symptoms of PDPH

## 2020-01-07 NOTE — PLAN OF CARE
VSS, sleeping between care, pain managed with prn and scheduled meds. Voiding and ambulating in room.  Assisted with BF. Encouraged can pump if not latching well.

## 2020-01-07 NOTE — PROGRESS NOTES
[facility]  Millie Quevedo  : 1995  MRN: [id number]  CSN: [id number]    Postpartum Day #1  Subjective   Patient is doing well overall. She has had some lochia, soaking through a pad every 2 hours. She has urinated. She has not had a BM, but has passed gas. She is ambulating well. She complains of minor back pain. She is tolerating diet well. She is breastfeeding.      Objective     Min/max vitals past 24 hours:   Temp  Min: 98 °F (36.7 °C)  Max: 98.7 °F (37.1 °C)  BP  Min: 114/68  Max: 160/109  Pulse  Min: 85  Max: 123  Pulse  Min: 85  Max: 123        Abdomen: soft, non-tender; bowel sounds normal; no masses   fundus firm and non-tender   Calves: Nontender, no cords palpable   Pelvic: deferred     Lab Results   Component Value Date    WBC 11.86 (H) 2020    HGB 11.6 (L) 2020    HCT 35.2 2020    MCV 87.6 2020     2020    RH Negative 2020    HEPBSAG Non-Reactive 2019        Assessment   1. Postpartum Day #1 S/P vaginal delivery. Patient appears to be recovering well.      Plan   1. Continue routine postpartum care        This document has been electronically signed by Mike Urbina, Medical Student on 2020 6:17 AM     I have seen the patient.  I have reviewed the notes, assessments, and/or procedures performed by the medical student and  I concur with her/his documentation and assessment and plan for Millie Quevedo.      Pt desires Nexplanon for postpartum contraception.  Discussed that this will be placed at 6wk pp visit.  Anticipate discharge on PPD2      Signature    This document has been electronically signed by Neda Brasher MD on 2020 2:43 PM

## 2020-01-07 NOTE — PLAN OF CARE
Problem: Patient Care Overview  Goal: Plan of Care Review  Outcome: Ongoing (interventions implemented as appropriate)  Flowsheets  Taken 1/7/2020 1745  Progress: improving  Outcome Summary: showered, ambulated in room, bonding positive, breastfeeding going well  Taken 1/7/2020 1331  Plan of Care Reviewed With: patient

## 2020-01-08 VITALS
HEART RATE: 98 BPM | BODY MASS INDEX: 30.8 KG/M2 | DIASTOLIC BLOOD PRESSURE: 79 MMHG | SYSTOLIC BLOOD PRESSURE: 131 MMHG | RESPIRATION RATE: 20 BRPM | OXYGEN SATURATION: 96 % | TEMPERATURE: 98 F | HEIGHT: 71 IN | WEIGHT: 220 LBS

## 2020-01-08 PROBLEM — O26.892 RH NEGATIVE STATUS DURING PREGNANCY IN SECOND TRIMESTER: Status: RESOLVED | Noted: 2019-07-27 | Resolved: 2020-01-08

## 2020-01-08 PROBLEM — Z37.9 NORMAL LABOR: Status: RESOLVED | Noted: 2020-01-06 | Resolved: 2020-01-08

## 2020-01-08 PROBLEM — Z67.91 RH NEGATIVE STATUS DURING PREGNANCY IN SECOND TRIMESTER: Status: RESOLVED | Noted: 2019-07-27 | Resolved: 2020-01-08

## 2020-01-08 LAB — CANNABINOIDS UR QL CFM: POSITIVE

## 2020-01-08 PROCEDURE — 99024 POSTOP FOLLOW-UP VISIT: CPT | Performed by: STUDENT IN AN ORGANIZED HEALTH CARE EDUCATION/TRAINING PROGRAM

## 2020-01-08 RX ORDER — IBUPROFEN 800 MG/1
800 TABLET ORAL EVERY 8 HOURS SCHEDULED
Qty: 90 TABLET | Refills: 0 | OUTPATIENT
Start: 2020-01-08 | End: 2020-07-24

## 2020-01-08 RX ADMIN — PRENATAL VIT W/ FE FUMARATE-FA TAB 27-0.8 MG 1 TABLET: 27-0.8 TAB at 08:51

## 2020-01-08 RX ADMIN — ACETAMINOPHEN 1000 MG: 500 TABLET ORAL at 06:02

## 2020-01-08 RX ADMIN — IBUPROFEN 800 MG: 800 TABLET ORAL at 06:08

## 2020-01-08 RX ADMIN — DOCUSATE SODIUM 100 MG: 100 CAPSULE, LIQUID FILLED ORAL at 08:51

## 2020-01-08 NOTE — DISCHARGE SUMMARY
[facility]  Millie Quevedo  : 1995  MRN: [id number]  CSN: [id number]    Discharge Summary:    Date of Admission: 2020  Date of Discharge:  2020    Admitting Diagnosis:  1. IUP @ 37w2d  2. Induction of labor for PROM     Discharge Diagnosis:  1. S/P        History and Hospital Course:  Patient is a   who presented with spontaneous rupture of membranes without regular uterine contractions.  Her pregnancy was complicated by an episode of  labor without delivery, hyperemesis gravidarum, and GBS +ve.  Please see History and Physical for full details.       She was admitted and progressed in labor with epidural analgesia to completely dilated. She had a vaginal delivery of a  viable female   infant who weighed 2800 g (6 lb 2.8 oz)  and APGARs of        APGARS  One minute Five minutes Ten minutes Fifteen minutes Twenty minutes   Skin color: 1   1             Heart rate: 2   2             Grimace: 2   2              Muscle tone: 2   2              Breathin   2              Totals: 9   9              . No immediate complications were encountered. Please see procedure note for full details.      Her postpartum course has been unremarkable. She had no signs or symptoms of acute blood loss anemia. She was ambulating well, voiding without difficulty and lochia was within normal limits. She was breast feeding without difficulty. She was stable for discharge on postpartum day 2.      Precautions and instructions were discussed with her including but not limited to maintaining a regular diet at home, practicing local hygiene, pelvic rest, and signs and symptoms to report including heavy bleeding, frequent passage of clots, fainting or dizziness, foul odor of lochia, increasing pain, fever, or any other concerns.    She was asked to follow up in the office in 2 weeks and 6 weeks. She desires a Nexplanon which will be placed at the 6 week postpartum visit. Discussed the importance of  pelvic rest and abstaining from intercourse for the time being.      Condition: Stable  Discharge Disposition: home  Discharge Diet:   Diet Instructions     Diet: Regular      Discharge Diet:  Regular        Activity at Discharge:   Activity Instructions     Pelvic Rest          Discharge Medications:       Discharge Medications      Continue These Medications      Instructions Start Date   acetaminophen 500 MG tablet  Commonly known as:  TYLENOL   500 mg, Oral, Every 6 Hours PRN      albuterol sulfate  (90 Base) MCG/ACT inhaler  Commonly known as:  PROVENTIL HFA;VENTOLIN HFA;PROAIR HFA   Inhalation, As needed       cyclobenzaprine 10 MG tablet  Commonly known as:  FLEXERIL   10 mg, Oral, Every 8 Hours PRN      omeprazole OTC 20 MG EC tablet  Commonly known as:  PrilOSEC OTC   20 mg, Oral, Daily      PRENATAL VITAMIN PLUS LOW IRON 27-1 MG tablet   TAKE 1 TABLET BY MOUTH EVERY DAY      promethazine 25 MG tablet  Commonly known as:  PHENERGAN   12.5 mg, Oral, Every 8 Hours PRN           Patient will restart all home medications including prenatal vitamins.        Shelton Corrigan M.D. PGY1  [facility]  [address] [place] [address]  Office: [phone]  This document has been electronically signed by Shelton Corrigan MD on January 8, 2020 7:15 AM

## 2020-01-08 NOTE — LACTATION NOTE
Rounded on mother this morning. Education given on THC usage during breastfeeding. Mother verbalized understanding that she needs to avoid this while breastfeeding. Mother reports that breastfeeding is going well. I was able to assess latch today and mother and baby are doing an excellent job. I am concerned that baby has some tongue restriction but appears to be nursing well. Will follow up by phone after d/c.

## 2020-01-08 NOTE — PROGRESS NOTES
[facility]  Millie Quevedo  : 1995  MRN: [id number]  CSN: [id number]    Postpartum Day #2  Subjective   The patient has no complaints. She has some soreness but pain is well controlled. Bleeding is less than a period amount. She has passed gas but has not had a bowel movement. She has an appropriate appetite, is voiding, and ambulating without discomfort. She is breastfeeding without issue. She feels ready to go home today.     Objective     Min/max vitals past 24 hours:   Temp  Min: 97.8 °F (36.6 °C)  Max: 98.7 °F (37.1 °C)  BP  Min: 129/90  Max: 173/79  Pulse  Min: 89  Max: 98  Pulse  Min: 89  Max: 98        Abdomen: soft, non-tender; bowel sounds normal; no masses   fundus firm and non-tender   Calves: Nontender, no cords palpable   Pelvic: deferred     Lab Results   Component Value Date    WBC 16.10 (H) 2020    HGB 11.0 (L) 2020    HCT 33.5 (L) 2020    MCV 88.2 2020     2020    RH Negative 2020    HEPBSAG Non-Reactive 2019        Assessment   1.  Postpartum Day #2 S/P vaginal delivery at 37w2d. Recovering appropriately with no acute complaints.     Plan   1. Continue routine postpartum care  2. Ambulate  3. Nexplanon to be placed at 6 wk postpartum visit  4. Discharge home today likely          Shelton Corrigan M.D. PGY1  [facility]  [address] [place] [address]  Office: [phone]  This document has been electronically signed by Shelton Corrigan MD on 2020 6:31 AM

## 2020-01-08 NOTE — PLAN OF CARE
Vss, minimal pain, ambulating in room. Patient has requested formula twice during the night to satisfy baby. Patient has also been pumping and giving baby breast milk with a bottle.

## 2020-01-13 ENCOUNTER — TELEPHONE (OUTPATIENT)
Dept: LACTATION | Facility: HOSPITAL | Age: 25
End: 2020-01-13

## 2020-01-13 NOTE — TELEPHONE ENCOUNTER
Mother reports breastfeeding is going well other than her infant is jaundiced. The pediatrician told her to make sure that she goes no long than 4 hours without eating and she was worried so she started pumping and bottle feeding instead of breastfeeding. She plans to attend support group this Wednesday as well. I encouraged her to still put the baby to breast every other or every 3rd feeding just so the baby will have some time at the breast. Mom said she would think about it.

## 2020-01-22 ENCOUNTER — OFFICE VISIT (OUTPATIENT)
Dept: OBSTETRICS AND GYNECOLOGY | Facility: CLINIC | Age: 25
End: 2020-01-22

## 2020-01-22 VITALS
WEIGHT: 199.2 LBS | SYSTOLIC BLOOD PRESSURE: 122 MMHG | HEIGHT: 71 IN | BODY MASS INDEX: 27.89 KG/M2 | DIASTOLIC BLOOD PRESSURE: 70 MMHG

## 2020-01-22 PROCEDURE — 0503F POSTPARTUM CARE VISIT: CPT | Performed by: OBSTETRICS & GYNECOLOGY

## 2020-01-22 NOTE — PROGRESS NOTES
"Chief complaint: Postpartum visit    SUBJECTIVE:   Pt is a 24 y.o.  now s/p  on 2020. Pt denies fever, abdominal pain, n/v/d/c, VB, Pt currently breast feeding and taking PNV, ambulating without difficulty, urinating and stooling without complaints and tolerating normal diet.  And has not had intercourse. EPDS 30 having some depression symptoms no SI or HI currently.  Patient would like to start on medication.  Plan to start on Zoloft 50 mg daily.  Desires Nexplanon for contraception.  OBJECTIVE:  /70   Ht 180.3 cm (71\")   Wt 90.4 kg (199 lb 3.2 oz)   BMI 27.78 kg/m²     Review of Systems   Constitutional: Negative for chills, fatigue and fever.   HENT: Negative for sore throat.    Eyes: Negative for visual disturbance.   Respiratory: Negative for cough, shortness of breath and wheezing.    Cardiovascular: Negative for chest pain, palpitations and leg swelling.   Gastrointestinal: Negative for abdominal pain, diarrhea, nausea and vomiting.   Genitourinary: Negative for dysuria, flank pain, frequency, vaginal bleeding, vaginal discharge and vaginal pain.   Neurological: Negative for syncope, light-headedness and headaches.   Psychiatric/Behavioral: Negative for dysphoric mood and suicidal ideas. The patient is not nervous/anxious.        Physical Exam   Constitutional: She is oriented to person, place, and time. She appears well-developed and well-nourished. No distress.   HENT:   Head: Normocephalic.   Musculoskeletal: Normal range of motion.   Neurological: She is alert and oriented to person, place, and time.   Skin: Skin is warm and dry. She is not diaphoretic.   Psychiatric: She has a normal mood and affect. Her behavior is normal. Judgment and thought content normal.   Vitals reviewed.      ASSESSMENT:    Pt is a 24 y.o.  s/p  doing well and recovering appropriately, is struggling with some postpartum depression.  Plan to treat with Zoloft.  PLAN:   1.  Nexplanon for " contraception, plan to place at 6-week visit  2. Pt to continue to increase exercise/daily activities as tolerated   3. Continue prenatal vitamins   4. f/u 2 weeks  5.  Zoloft 50 mg daily, follow-up in 2 weeks to see how depression is doing.    Med reconciliation completed.        This document has been electronically signed by Tyshawn Colon DO on January 22, 2020 10:02 AM

## 2020-02-05 ENCOUNTER — OFFICE VISIT (OUTPATIENT)
Dept: OBSTETRICS AND GYNECOLOGY | Facility: CLINIC | Age: 25
End: 2020-02-05

## 2020-02-05 VITALS
DIASTOLIC BLOOD PRESSURE: 64 MMHG | WEIGHT: 200 LBS | SYSTOLIC BLOOD PRESSURE: 99 MMHG | HEIGHT: 71 IN | BODY MASS INDEX: 28 KG/M2

## 2020-02-05 DIAGNOSIS — Z30.09 CONTRACEPTIVE EDUCATION: ICD-10-CM

## 2020-02-05 PROCEDURE — 0503F POSTPARTUM CARE VISIT: CPT | Performed by: OBSTETRICS & GYNECOLOGY

## 2020-02-05 NOTE — PROGRESS NOTES
"Chief complaint: Postpartum visit    SUBJECTIVE:   Pt is a 24 y.o.  now s/p  3 weeks ago.. Pt denies fever, abdominal pain, n/v/d/c, VB, Pt currently bottle feeding and taking PNV, ambulating without difficulty, urinating and stooling without complaints and tolerating normal diet.  Patient was started on Zoloft at her last visit states that since starting this she is doing much better.  Currently taking 50 mg daily and feels like this is working appropriately.  Patient would like to have Nexplanon for contraception.  Is concerned about changes in 's insurance.  We will get her in at next available appointment for Nexplanon placement..  OBJECTIVE:  BP 99/64   Ht 180.3 cm (71\")   Wt 90.7 kg (200 lb)   BMI 27.89 kg/m²     Review of Systems   Constitutional: Negative for chills, fatigue and fever.   HENT: Negative for sore throat.    Eyes: Negative for visual disturbance.   Respiratory: Negative for cough, shortness of breath and wheezing.    Cardiovascular: Negative for chest pain, palpitations and leg swelling.   Gastrointestinal: Negative for abdominal pain, diarrhea, nausea and vomiting.   Genitourinary: Negative for dysuria, flank pain, frequency, vaginal bleeding, vaginal discharge and vaginal pain.   Neurological: Negative for syncope, light-headedness and headaches.   Psychiatric/Behavioral: Negative for dysphoric mood and suicidal ideas. The patient is not nervous/anxious.        Physical Exam   Constitutional: She is oriented to person, place, and time. She appears well-developed and well-nourished. No distress.   HENT:   Head: Normocephalic.   Musculoskeletal: Normal range of motion.   Neurological: She is alert and oriented to person, place, and time.   Skin: Skin is warm and dry. She is not diaphoretic.   Psychiatric: She has a normal mood and affect. Her behavior is normal. Judgment and thought content normal.   Vitals reviewed.      ASSESSMENT:    Pt is a 24 y.o.  s/p  " doing well and recovering appropriately.  Postpartum depression well controlled on Zoloft 50 mg daily.  PLAN:   1.  Nexplanon on for contraception patient to book next available appointment.  2. Pt to continue to increase exercise/daily activities as tolerated   3. Continue prenatal vitamins   4. f/u next available for Nexplanon and then 3 weeks for postpartum visit    Med reconciliation completed.        This document has been electronically signed by Tyshawn Colon DO on February 5, 2020 1:19 PM

## 2020-02-06 ENCOUNTER — PROCEDURE VISIT (OUTPATIENT)
Dept: OBSTETRICS AND GYNECOLOGY | Facility: CLINIC | Age: 25
End: 2020-02-06

## 2020-02-06 VITALS
BODY MASS INDEX: 28 KG/M2 | WEIGHT: 200 LBS | SYSTOLIC BLOOD PRESSURE: 110 MMHG | DIASTOLIC BLOOD PRESSURE: 72 MMHG | HEIGHT: 71 IN

## 2020-02-06 DIAGNOSIS — Z30.017 NEXPLANON INSERTION: Primary | ICD-10-CM

## 2020-02-06 PROCEDURE — 11981 INSERTION DRUG DLVR IMPLANT: CPT | Performed by: NURSE PRACTITIONER

## 2020-02-06 NOTE — PROGRESS NOTES
Nexplanon Insertion NDC # 3843-8097-43    No LMP recorded.  Periods have not returned. Negative UPT test today.     Date of procedure:  2/6/2020    Risks and benefits discussed? yes  All questions answered? yes  Consents given by the patient  Written consent obtained? yes    Local anesthesia used:  yes - 3 cc's of  Meds; anesthesia local: 1% lidocaine with epinephrine    Procedure documentation:    The upper left arm (non-dominant) was marked at the intended site of insertion. The skin was cleansed with an antiseptic solution.  Local anesthesia was injected.  The Nexplanon was placed subdermally without difficulty.  The devise was able to be palpated in the arm by both myself and Millie.  The site was cleansed then a 4x4 clean gauze was place over the site of insertion and wrapped with gauze.     She tolerated the procedure well.  There were no complications.  EBL was minimal.    Post procedure instructions: Remove the wrapping in 24 hours and cover with a band aid if still open.    Follow up needed: PRN    This note was electronically signed.    Jayla Savage, NISHI  February 6, 2020

## 2020-02-19 ENCOUNTER — TELEPHONE (OUTPATIENT)
Dept: OBSTETRICS AND GYNECOLOGY | Facility: CLINIC | Age: 25
End: 2020-02-19

## 2020-07-25 ENCOUNTER — HOSPITAL ENCOUNTER (EMERGENCY)
Facility: HOSPITAL | Age: 25
Discharge: HOME | End: 2020-07-25
Attending: FAMILY MEDICINE | Admitting: FAMILY MEDICINE

## 2020-07-25 ENCOUNTER — APPOINTMENT (OUTPATIENT)
Dept: CT IMAGING | Facility: HOSPITAL | Age: 25
End: 2020-07-25

## 2020-07-25 VITALS
TEMPERATURE: 98.1 F | DIASTOLIC BLOOD PRESSURE: 75 MMHG | OXYGEN SATURATION: 99 % | RESPIRATION RATE: 18 BRPM | WEIGHT: 174 LBS | HEART RATE: 109 BPM | SYSTOLIC BLOOD PRESSURE: 121 MMHG | HEIGHT: 71 IN | BODY MASS INDEX: 24.36 KG/M2

## 2020-07-25 DIAGNOSIS — S22.059A CLOSED FRACTURE OF SIXTH THORACIC VERTEBRA, UNSPECIFIED FRACTURE MORPHOLOGY, INITIAL ENCOUNTER (HCC): Primary | ICD-10-CM

## 2020-07-25 LAB — B-HCG UR QL: NEGATIVE

## 2020-07-25 PROCEDURE — 81025 URINE PREGNANCY TEST: CPT | Performed by: NURSE PRACTITIONER

## 2020-07-25 PROCEDURE — 99283 EMERGENCY DEPT VISIT LOW MDM: CPT

## 2020-07-25 PROCEDURE — 72128 CT CHEST SPINE W/O DYE: CPT

## 2020-07-25 RX ORDER — HYDROCODONE BITARTRATE AND ACETAMINOPHEN 5; 325 MG/1; MG/1
1 TABLET ORAL EVERY 6 HOURS PRN
Qty: 12 TABLET | Refills: 0 | Status: SHIPPED | OUTPATIENT
Start: 2020-07-25 | End: 2020-07-29

## 2020-07-25 NOTE — ED TRIAGE NOTES
Pt seen at [facility] yesterday and dx with fx of T6. She was instructed to go to ED if pain worsened.

## 2020-07-25 NOTE — ED PROVIDER NOTES
Subjective   24-year-old female in the emergency department complaining of thoracic type back pain.  Patient was seen in urgent care on previous date.  Notes reviewed and she explains that 3 days ago her  flipped her on a trampoline and she felt something pop.  Patient denies any type of paresthesias.  She does have a decreased range of motion in the spine just related to the pain.  She was treated with IM Toradol and was instructed to follow-up with primary care and orthopedic referral placed as well.      History provided by:  Patient   used: No        Review of Systems   Constitutional: Negative for chills, diaphoresis and fatigue.   Eyes: Negative for visual disturbance.   Respiratory: Negative for shortness of breath.    Cardiovascular: Negative for chest pain and palpitations.   Gastrointestinal: Negative for abdominal pain, diarrhea, nausea and vomiting.   Genitourinary: Negative for flank pain.   Musculoskeletal: Positive for back pain. Negative for neck pain and neck stiffness.   Skin: Negative for wound.   Allergic/Immunologic: Negative for immunocompromised state.   Neurological: Negative for weakness.   Hematological: Negative for adenopathy.   Psychiatric/Behavioral: Negative for confusion.   All other systems reviewed and are negative.      Past Medical History:   Diagnosis Date   • Abnormal Pap smear of cervix    • Acute maxillary sinusitis    • Acute otitis externa    • Acute pharyngitis    • Acute tonsillitis    • Allergic asthma     IgE-mediated allergic asthma     • Allergic rhinitis    • Allergic rhinitis due to pollen    • Anxiety    • Asthma    • Chondromalacia of patella     left knee    • Common cold    • Cough    • Diarrhea    • Disorder of gallbladder     Probable biliary dyskinesia    • Epigastric pain    • Foot pain    • Gastroenteritis    • Generalized abdominal pain    • Headache    • History of colonoscopy 03/27/2013    Colon endoscopy 38938 (1)  -  Internal  & external hemorrhoids found.   • History of esophagogastroduodenoscopy (EGD) 03/27/2013    w/ tube 37497 (1)  -  Normal esophagus. Gastritis in stomach. Biopsy taken. Normal duodenum. Biospy taken   • History of marijuana use    • HPV (human papilloma virus) infection    • IBS (irritable bowel syndrome)    • Influenza    • Irregular periods    • Irritable bowel syndrome    • Migraine    • Nausea    • Nausea and vomiting    • Osgood-Schlatter's disease    • Pain in throat    • Patellar tendonitis    • Right upper quadrant pain    • Streptococcal sore throat    • Temporomandibular joint disorder     WISDOM TEETH    • Upper respiratory infection    • Urgency of urination    • Urinary tract infectious disease    • Varicella    • Viral gastroenteritis    • Vulvovaginitis        Allergies   Allergen Reactions   • Aspirin Shortness Of Breath     TIGHT CHEST   • Erythromycin Base Rash   • Latex Rash     Rash        Past Surgical History:   Procedure Laterality Date   • CHOLECYSTECTOMY  06/06/2013    Laparoscopic  -  laparoscopic cholecystectomy with intraoperative cholangiogram. Cholecystitis.   • INJECTION OF MEDICATION  01/08/2013    Toradol (1)   -  WOdalis Sotomayor   • LAPAROSCOPIC CHOLECYSTECTOMY     • WISDOM TOOTH EXTRACTION         Family History   Adopted: Yes   Problem Relation Age of Onset   • Cancer Other    • Diabetes Other    • Heart disease Other    • Hypertension Other    • Stroke Other    • Lung disease Other    • Cholelithiasis Other    • Ulcers Other    • Other Other         Colon problems   • Ovarian cysts Mother    • Bipolar disorder Mother    • Irritable bowel syndrome Mother    • Ovarian cancer Mother    • No Known Problems Sister    • Emphysema Maternal Grandmother    • Heart attack Maternal Grandfather    • No Known Problems Sister    • No Known Problems Sister        Social History     Socioeconomic History   • Marital status:      Spouse name: Not on file   • Number of children: Not on file   •  Years of education: Not on file   • Highest education level: Not on file   Tobacco Use   • Smoking status: Never Smoker   • Smokeless tobacco: Never Used   Substance and Sexual Activity   • Alcohol use: No     Frequency: Never   • Drug use: Yes     Types: Marijuana     Comment: stopped with positive pregnancy test    • Sexual activity: Yes     Partners: Male     Birth control/protection: None     Comment: last pap smear 5/17/19 ASCUS            Objective   Physical Exam   Constitutional: She is oriented to person, place, and time. Vital signs are normal. She appears well-developed and well-nourished.   HENT:   Head: Normocephalic.   Nose: Nose normal.   Eyes: Pupils are equal, round, and reactive to light. Conjunctivae are normal.   Neck: Normal range of motion.   Cardiovascular: Normal rate, regular rhythm and normal heart sounds.   Pulmonary/Chest: Effort normal and breath sounds normal.   Abdominal: Soft.   Musculoskeletal: She exhibits tenderness.        Thoracic back: She exhibits tenderness.        Back:    Neurological: She is alert and oriented to person, place, and time. GCS eye subscore is 4. GCS verbal subscore is 5. GCS motor subscore is 6.   Skin: Skin is warm and dry.   Psychiatric: She has a normal mood and affect.   Nursing note and vitals reviewed.      Procedures           ED Course  ED Course as of Jul 25 1434   Sat Jul 25, 2020   13[address] trish [id number]    [JL]      ED Course User Index  [JL] Isaac Ahmadi, NISHI      Minimal compression deformity superior endplate of T6 which may  be recent.                                     MDM  Number of Diagnoses or Management Options  Closed fracture of sixth thoracic vertebra, unspecified fracture morphology, initial encounter (CMS/[facility]): new and requires workup  Diagnosis management comments: Follow up with pcp        Amount and/or Complexity of Data Reviewed  Clinical lab tests: reviewed and ordered  Tests in the radiology section of CPT®: ordered and  reviewed        Final diagnoses:   Closed fracture of sixth thoracic vertebra, unspecified fracture morphology, initial encounter (CMS/[facility])            Isaac Ahmadi, APRN  07/25/20 4501

## 2020-07-27 ENCOUNTER — TELEPHONE (OUTPATIENT)
Dept: OTHER | Facility: OTHER | Age: 25
End: 2020-07-27

## 2020-07-27 NOTE — TELEPHONE ENCOUNTER
Received a referral from  Emergency Dept stating that patient is needing a PCP. Patient was advised that she could be worked in with Dr. Mcguire sometime this week due to a fracture in her back. First appointment available on schedule was not until 8/24.Patient is requesting a call back to see if she can be scheduled for an appointment sometime this week. Please advise. Patient can be reached at [phone] (U)

## 2020-07-29 ENCOUNTER — OFFICE VISIT (OUTPATIENT)
Dept: FAMILY MEDICINE CLINIC | Facility: CLINIC | Age: 25
End: 2020-07-29

## 2020-07-29 VITALS
HEIGHT: 71 IN | DIASTOLIC BLOOD PRESSURE: 70 MMHG | WEIGHT: 181 LBS | OXYGEN SATURATION: 99 % | BODY MASS INDEX: 25.34 KG/M2 | SYSTOLIC BLOOD PRESSURE: 120 MMHG | HEART RATE: 99 BPM | TEMPERATURE: 98 F

## 2020-07-29 DIAGNOSIS — S22.060A COMPRESSION FRACTURE OF T7 VERTEBRA, INITIAL ENCOUNTER (HCC): Primary | ICD-10-CM

## 2020-07-29 PROCEDURE — 99213 OFFICE O/P EST LOW 20 MIN: CPT | Performed by: STUDENT IN AN ORGANIZED HEALTH CARE EDUCATION/TRAINING PROGRAM

## 2020-07-29 RX ORDER — KETOROLAC TROMETHAMINE 30 MG/ML
60 INJECTION, SOLUTION INTRAMUSCULAR; INTRAVENOUS ONCE
Status: COMPLETED | OUTPATIENT
Start: 2020-07-29 | End: 2020-07-29

## 2020-07-29 RX ORDER — HYDROCODONE BITARTRATE AND ACETAMINOPHEN 5; 325 MG/1; MG/1
1 TABLET ORAL EVERY 6 HOURS PRN
Qty: 30 TABLET | Refills: 0 | Status: SHIPPED | OUTPATIENT
Start: 2020-07-29 | End: 2020-08-11

## 2020-07-29 RX ORDER — MELOXICAM 15 MG/1
15 TABLET ORAL DAILY
Qty: 30 TABLET | Refills: 0 | Status: SHIPPED | OUTPATIENT
Start: 2020-07-29 | End: 2021-04-01

## 2020-07-29 RX ORDER — PREDNISONE 10 MG/1
20 TABLET ORAL DAILY
Qty: 5 TABLET | Refills: 0 | Status: SHIPPED | OUTPATIENT
Start: 2020-07-29 | End: 2020-09-23

## 2020-07-29 NOTE — PROGRESS NOTES
Family Medicine Residency  Shelton Corrigan MD    Subjective:     Millie Quevedo is a 24 y.o. female who presents for establish care and ER follow-up of back pain.  CT of the thoracic spine showed acute minimal compression fracture of the T7 vertebral body.  On 7/24, her  flipped her on the trampoline when she felt something pop.  She has been seen at urgent care and ER.  She has received a back brace.  She was given 3-day course of Norco for pain and has been using some leftover ibuprofen 300.  She reports severe pain that interferes with sleep.  It is exacerbated by lying down and certain movements.  She denies numbness, tingling, or bowel/bladder incontinence.  She endorses sharp pain with deep breathing.  She also has radiation of pain to her sternum.    The following portions of the patient's history were reviewed and updated as appropriate: allergies, current medications, past family history, past medical history, past social history, past surgical history and problem list.    Past Medical Hx:  Past Medical History:   Diagnosis Date   • Abnormal Pap smear of cervix    • Acute maxillary sinusitis    • Acute otitis externa    • Acute pharyngitis    • Acute tonsillitis    • Allergic asthma     IgE-mediated allergic asthma     • Allergic rhinitis    • Allergic rhinitis due to pollen    • Anxiety    • Asthma    • Chondromalacia of patella     left knee    • Common cold    • Cough    • Diarrhea    • Disorder of gallbladder     Probable biliary dyskinesia    • Epigastric pain    • Foot pain    • Gastroenteritis    • Generalized abdominal pain    • Headache    • History of colonoscopy 03/27/2013    Colon endoscopy 67261 (1)  -  Internal & external hemorrhoids found.   • History of esophagogastroduodenoscopy (EGD) 03/27/2013    w/ tube 86722 (1)  -  Normal esophagus. Gastritis in stomach. Biopsy taken. Normal duodenum. Biospy taken   • History of marijuana use    • HPV (human papilloma virus) infection     • IBS (irritable bowel syndrome)    • Influenza    • Irregular periods    • Irritable bowel syndrome    • Migraine    • Nausea    • Nausea and vomiting    • Osgood-Schlatter's disease    • Pain in throat    • Patellar tendonitis    • Right upper quadrant pain    • Streptococcal sore throat    • Temporomandibular joint disorder     WISDOM TEETH    • Upper respiratory infection    • Urgency of urination    • Urinary tract infectious disease    • Varicella    • Viral gastroenteritis    • Vulvovaginitis        Past Surgical Hx:  Past Surgical History:   Procedure Laterality Date   • CHOLECYSTECTOMY  06/06/2013    Laparoscopic  -  laparoscopic cholecystectomy with intraoperative cholangiogram. Cholecystitis.   • INJECTION OF MEDICATION  01/08/2013    Toradol (1)   -  WOdalis Sotomayor   • LAPAROSCOPIC CHOLECYSTECTOMY     • WISDOM TOOTH EXTRACTION         Current Meds:    Current Outpatient Medications:   •  HYDROcodone-acetaminophen (NORCO) 5-325 MG per tablet, Take 1 tablet by mouth Every 6 (Six) Hours As Needed for Moderate Pain ., Disp: 30 tablet, Rfl: 0  •  meloxicam (MOBIC) 15 MG tablet, Take 1 tablet by mouth Daily., Disp: 30 tablet, Rfl: 0  •  predniSONE (DELTASONE) 10 MG tablet, Take 2 tablets by mouth Daily., Disp: 5 tablet, Rfl: 0  No current facility-administered medications for this visit.     Allergies:  Allergies   Allergen Reactions   • Aspirin Shortness Of Breath     TIGHT CHEST   • Erythromycin Base Rash   • Latex Rash     Rash        Family Hx:  Family History   Adopted: Yes   Problem Relation Age of Onset   • Cancer Other    • Diabetes Other    • Heart disease Other    • Hypertension Other    • Stroke Other    • Lung disease Other    • Cholelithiasis Other    • Ulcers Other    • Other Other         Colon problems   • Ovarian cysts Mother    • Bipolar disorder Mother    • Irritable bowel syndrome Mother    • Ovarian cancer Mother    • No Known Problems Sister    • Emphysema Maternal Grandmother    • Heart  "attack Maternal Grandfather    • No Known Problems Sister    • No Known Problems Sister         Social History:  Social History     Socioeconomic History   • Marital status:      Spouse name: Not on file   • Number of children: Not on file   • Years of education: Not on file   • Highest education level: Not on file   Tobacco Use   • Smoking status: Never Smoker   • Smokeless tobacco: Never Used   Substance and Sexual Activity   • Alcohol use: No     Frequency: Never   • Drug use: Yes     Types: Marijuana     Comment: stopped with positive pregnancy test    • Sexual activity: Yes     Partners: Male     Birth control/protection: None     Comment: last pap smear 5/17/19 ASCUS        Review of Systems  Review of Systems   Constitutional: Negative for activity change, appetite change, fatigue and fever.   HENT: Negative for congestion, rhinorrhea, sinus pain and sore throat.    Eyes: Negative for pain, redness and visual disturbance.   Respiratory: Negative for cough, shortness of breath and wheezing.    Cardiovascular: Negative for chest pain, palpitations and leg swelling.   Gastrointestinal: Negative for abdominal pain, constipation, diarrhea, nausea and vomiting.   Genitourinary: Negative for dysuria and flank pain.   Musculoskeletal: Positive for back pain. Negative for arthralgias, joint swelling and myalgias.   Skin: Negative for color change, pallor and rash.   Neurological: Negative for dizziness, light-headedness and headaches.       Objective:     /70   Pulse 99   Temp 98 °F (36.7 °C)   Ht 180.3 cm (71\")   Wt 82.1 kg (181 lb)   LMP 07/11/2020   SpO2 99%   BMI 25.24 kg/m²   Physical Exam   Constitutional: She is oriented to person, place, and time. Vital signs are normal. She appears well-developed and well-nourished. No distress.   HENT:   Head: Normocephalic and atraumatic.   Right Ear: Hearing normal.   Left Ear: Hearing normal.   Eyes: Pupils are equal, round, and reactive to light. " Conjunctivae, EOM and lids are normal.   Neck: Normal range of motion. Neck supple.   Cardiovascular: Normal rate, regular rhythm and normal heart sounds.   No murmur heard.  Pulmonary/Chest: Effort normal and breath sounds normal. No respiratory distress. She has no wheezes. She has no rales.   Abdominal: Soft. Normal appearance and bowel sounds are normal. She exhibits no distension. There is no tenderness.   Musculoskeletal: Normal range of motion. She exhibits no edema or deformity.        Thoracic back: She exhibits tenderness and pain. She exhibits no edema, no deformity and no laceration.   Neurological: She is alert and oriented to person, place, and time. She has normal strength. She is not disoriented.   Skin: Skin is warm, dry and intact. No rash noted. She is not diaphoretic. No erythema. No pallor.   Nursing note and vitals reviewed.       Assessment/Plan:     Millie was seen today for pain and back pain.    Diagnoses and all orders for this visit:    Compression fracture of T7 vertebra, initial encounter (CMS/[facility])  -     ketorolac (TORADOL) injection 60 mg  -     HYDROcodone-acetaminophen (NORCO) 5-325 MG per tablet; Take 1 tablet by mouth Every 6 (Six) Hours As Needed for Moderate Pain .  -     meloxicam (MOBIC) 15 MG tablet; Take 1 tablet by mouth Daily.  -     Ambulatory Referral to Neurosurgery  -     MRI thoracic spine w contrast; Future  -     predniSONE (DELTASONE) 10 MG tablet; Take 2 tablets by mouth Daily.  - CT scan confirmed compression fracture at superior endplate of the T7 vertebral body, per Dr. Zimmerman's interpretation.  - Will order a STAT MRI with urgent referral to neurosurgery for further management. May be a candidate for vertebroplasty or other form of repair.  - Given toradol shot in clinic. Prescribed Norco (30), Mobic, and 5-day course of steroids. Discussed that we will prescribe a short course of opioids until she is seen by neurosrugery. RTC 1 month and can discuss PT at that  time.       · Rx changes: norco, mobic, prednisone  · Patient Education: MRI, neurosurgery f/u  · Compliance at present is estimated to be good.      Follow-up:     Return in about 4 weeks (around 8/26/2020) for Recheck.    Preventative:  Health Maintenance   Topic Date Due   • ANNUAL PHYSICAL  11/08/1997   • HPV VACCINES (1 - Female 2-dose series) 11/08/2005   • INFLUENZA VACCINE  08/01/2020   • PAP SMEAR  05/17/2022   • TDAP/TD VACCINES (2 - Td) 11/14/2029   • HEPATITIS C SCREENING  Completed     Female Preventative: Last PAP was 5/7/2019  Recommended: HPV  Vaccine Counseling: N/A    Weight  -Class: Overweight: 25.0-29.9kg/m2   -Patient's Body mass index is 25.24 kg/m². BMI is above normal parameters. Recommendations include: exercise counseling and nutrition counseling.   eat more fruits and vegetables, decrease soda or juice intake, increase water intake, increase physical activity, reduce screen time and reduce portion size    Alcohol use:  reports that she does not drink alcohol.  Nicotine status  reports that she has never smoked. She has never used smokeless tobacco.    Goals    None         RISK SCORE: 2      Shelton Corrigan M.D. PGY2  [facility] Family Medicine Residency  [address] [place] [address]  Office: [phone]  This document has been electronically signed by Shelton Corrigan MD on July 29, 2020 18:57      Dictated utilizing Dragon dictation.

## 2020-07-30 NOTE — PROGRESS NOTES
I have reviewed the notes, assessments, and/or procedures performed by Dr. ONESIMO Corrigan, I concur with her/his documentation and assessment and plan for Millie Quevedo.                This document has been electronically signed by Master Johnson MD on July 30, 2020 09:48

## 2020-08-04 ENCOUNTER — HOSPITAL ENCOUNTER (OUTPATIENT)
Dept: MRI IMAGING | Facility: HOSPITAL | Age: 25
Discharge: HOME | End: 2020-08-04
Admitting: RADIOLOGY

## 2020-08-04 DIAGNOSIS — S22.060A COMPRESSION FRACTURE OF T7 VERTEBRA, INITIAL ENCOUNTER (HCC): ICD-10-CM

## 2020-08-04 PROCEDURE — 72146 MRI CHEST SPINE W/O DYE: CPT

## 2020-08-10 NOTE — PROGRESS NOTES
Primary Care Provider: Shelotn Corrigan MD  Requesting Provider: Shelton Corrigan MD    Chief Complaint:   Chief Complaint   Patient presents with   • Back Pain     presents for establish care and ER follow-up of back pain.  CT of the thoracic spine showed acute minimal compression fracture of the T7 vertebral body.  On 7/24, her  flipped her on the trampoline when she felt something pop.  She has been seen at urgent care and ER.  She has received a back brace.  She was given 3-day course of Norco for pain and has been using some leftover ibuprofen 300.  She reports severe pain that interferes with sleep.  It is exacerbated by lying down and certain movement     History of Present Illness  Consultation today at the request of Shelton Corrigan MD    HPI:  Millie Quevedo is a 24 y.o. female who presents today with a complaint of mid thoracic back pain.  No previous spine surgeries.    Onset 7/24/2020 as a result of a hyperflexion injury that occurred while on a trampoline.  She denies loss of consciousness.  She reports an immediate onset of midthoracic back pain.  Since onset, she has been evaluated by [facility] urgent care, as well as [place] ED.  She was placed in an LSO brace and discharged to follow-up with neurosurgery.    Currently, Millie complains of constant mid thoracic back pain that is worsened since onset.  She states her discomfort worsens with sudden movements and while transitioning from either a lying to standing position.  Intermittent radicular pain to the right posterior lateral ribs.  She denies upper or lower extremity radicular pain, weakness, numbness, or tingling.  No alleviation of her discomfort with use of the LSO brace.  Her discomfort decreases to some extent with lying flat in a supine position.  She denies fevers, chills, night sweats, unexplained weight loss, saddle anesthesia, or bowel or bladder dysfunction.  She currently rates the severity of her  symptoms 8/10.  No additional concerns at this time.    Oswestry Disability Index = 68%   Score   Pain Intensity Fairly severe pain-3   Personal Care I need some help but manage most of my personal care-3   Lifting Medium weights off a table-3   Walking Pain prevents > 1 mile-1   Sitting Pain prevents sitting > 10 min-4   Standing Pain limits standing to < 1/2 hr-3   Sleeping Can only sleep < 2 hrs-4   Sex Life (if applicable) Sex is nearly absent due to pain-5   Social Life Pain restricts me to my home-4   Traveling Pain prevents travel except for doctors offices-5   (Parisa et al, 1980)    SCORE INTERPRETATION OF THE OSWESTRY LBP DISABILITY QUESTIONNAIRE     60-80% Crippled Back pain impinges on all aspects of these patients’ lives both at home and at work. Positive intervention is required.     ROS  Review of Systems   Constitutional: Negative.    Eyes: Negative.    Respiratory: Negative.    Cardiovascular: Positive for chest pain.   Gastrointestinal: Negative.    Endocrine: Negative.    Genitourinary: Negative.    Musculoskeletal: Positive for joint swelling.   Skin: Negative.    Allergic/Immunologic: Negative.    Neurological: Positive for headaches.   Hematological: Negative.    Psychiatric/Behavioral: Negative.    All other systems reviewed and are negative.    Past Medical History:   Diagnosis Date   • Abnormal Pap smear of cervix    • Acute maxillary sinusitis    • Acute otitis externa    • Acute pharyngitis    • Acute tonsillitis    • Allergic asthma     IgE-mediated allergic asthma     • Allergic rhinitis    • Allergic rhinitis due to pollen    • Anxiety    • Asthma    • Chondromalacia of patella     left knee    • Closed wedge compression fracture of T7 vertebra with routine healing 8/11/2020   • Common cold    • Cough    • Diarrhea    • Disorder of gallbladder     Probable biliary dyskinesia    • Epigastric pain    • Foot pain    • Gastroenteritis    • Generalized abdominal pain    • Headache    •  History of colonoscopy 03/27/2013    Colon endoscopy 93035 (1)  -  Internal & external hemorrhoids found.   • History of esophagogastroduodenoscopy (EGD) 03/27/2013    w/ tube 42069 (1)  -  Normal esophagus. Gastritis in stomach. Biopsy taken. Normal duodenum. Biospy taken   • History of marijuana use    • HPV (human papilloma virus) infection    • IBS (irritable bowel syndrome)    • Influenza    • Irregular periods    • Irritable bowel syndrome    • Migraine    • Nausea    • Nausea and vomiting    • Osgood-Schlatter's disease    • Pain in throat    • Patellar tendonitis    • Right upper quadrant pain    • Streptococcal sore throat    • Temporomandibular joint disorder     WISDOM TEETH    • Upper respiratory infection    • Urgency of urination    • Urinary tract infectious disease    • Varicella    • Viral gastroenteritis    • Vulvovaginitis        Past Surgical History:   Procedure Laterality Date   • CHOLECYSTECTOMY  06/06/2013    Laparoscopic  -  laparoscopic cholecystectomy with intraoperative cholangiogram. Cholecystitis.   • INJECTION OF MEDICATION  01/08/2013    Toradol (1)   -  FLORY Sotomayor   • LAPAROSCOPIC CHOLECYSTECTOMY     • WISDOM TOOTH EXTRACTION         Family History: family history includes Bipolar disorder in her mother; Cancer in an other family member; Cholelithiasis in an other family member; Diabetes in an other family member; Emphysema in her maternal grandmother; Heart attack in her maternal grandfather; Heart disease in an other family member; Hypertension in an other family member; Irritable bowel syndrome in her mother; Lung disease in an other family member; No Known Problems in her sister, sister, and sister; Other in an other family member; Ovarian cancer in her mother; Ovarian cysts in her mother; Stroke in an other family member; Ulcers in an other family member. She was adopted.    Social History:  reports that she has never smoked. She has never used smokeless tobacco. She reports  "that she has current or past drug history. Drug: Marijuana. She reports that she does not drink alcohol.    Medications:    Current Outpatient Medications:   •  meloxicam (MOBIC) 15 MG tablet, Take 1 tablet by mouth Daily., Disp: 30 tablet, Rfl: 0  •  HYDROcodone-acetaminophen (NORCO) 5-325 MG per tablet, 1 tab PO Q 6 hr PRN x 1 wk, 1 tab PO q 8 hr PRN x 1 wk, 1 tab PO q 12 hr PRN x 1 wk, Disp: 63 tablet, Rfl: 0  •  predniSONE (DELTASONE) 10 MG tablet, Take 2 tablets by mouth Daily., Disp: 5 tablet, Rfl: 0    Allergies:  Aspirin; Erythromycin base; and Latex    Objective   Ht 180.3 cm (71\")   Wt 79.8 kg (176 lb)   BMI 24.55 kg/m²   Physical Exam   Constitutional: She is oriented to person, place, and time. Vital signs are normal. She appears well-developed and well-nourished. She is cooperative.  Non-toxic appearance. She does not have a sickly appearance. She does not appear ill. No distress.   BMI 24.6   HENT:   Head: Normocephalic and atraumatic.   Right Ear: Hearing normal.   Left Ear: Hearing normal.   Mouth/Throat: Mucous membranes are normal.   Eyes: Pupils are equal, round, and reactive to light. Conjunctivae and EOM are normal.   Neck: Trachea normal and full passive range of motion without pain. Neck supple.   Cardiovascular: Normal rate and regular rhythm.   Pulmonary/Chest: Effort normal. No accessory muscle usage. No apnea, no tachypnea and no bradypnea. No respiratory distress.   Abdominal: Soft. Normal appearance.   Musculoskeletal:   Palpable tenderness to the mid thoracic spine.   Neurological: She is alert and oriented to person, place, and time. Gait normal. GCS eye subscore is 4. GCS verbal subscore is 5. GCS motor subscore is 6.   Reflex Scores:       Tricep reflexes are 2+ on the right side and 2+ on the left side.       Bicep reflexes are 2+ on the right side and 2+ on the left side.       Brachioradialis reflexes are 2+ on the right side and 2+ on the left side.       Patellar reflexes are " 2+ on the right side and 2+ on the left side.       Achilles reflexes are 2+ on the right side and 2+ on the left side.  Skin: Skin is warm, dry and intact. She is not diaphoretic.   Psychiatric: She has a normal mood and affect. Her speech is normal and behavior is normal.   Nursing note and vitals reviewed.    Neurologic Exam     Mental Status   Oriented to person, place, and time.   Attention: normal. Concentration: normal.   Speech: speech is normal   Level of consciousness: alert    Cranial Nerves     CN II   Visual fields full to confrontation.     CN III, IV, VI   Pupils are equal, round, and reactive to light.  Extraocular motions are normal.     CN V   Facial sensation intact.     CN VII   Facial expression full, symmetric.     CN VIII   CN VIII normal.     CN IX, X   CN IX normal.     CN XI   CN XI normal.     Motor Exam   Muscle bulk: normal  Overall muscle tone: normal  Right arm tone: normal  Left arm tone: normal  Right arm pronator drift: absent  Left arm pronator drift: absent  Right leg tone: normal  Left leg tone: normal    Strength   Right deltoid: 5/5  Left deltoid: 5/5  Right biceps: 5/5  Left biceps: 5/5  Right triceps: 5/5  Left triceps: 5/5  Right wrist extension: 5/5  Left wrist extension: 5/5  Right iliopsoas: 5/5  Left iliopsoas: 5/5  Right quadriceps: 5/5  Left quadriceps: 5/5  Right anterior tibial: 5/5  Left anterior tibial: 5/5  Right posterior tibial: 5/5  Left posterior tibial: 5/5    Sensory Exam   Light touch normal.     Gait, Coordination, and Reflexes     Gait  Gait: normal    Tremor   Resting tremor: absent  Intention tremor: absent  Action tremor: absent    Reflexes   Right brachioradialis: 2+  Left brachioradialis: 2+  Right biceps: 2+  Left biceps: 2+  Right triceps: 2+  Left triceps: 2+  Right patellar: 2+  Left patellar: 2+  Right achilles: 2+  Left achilles: 2+  Right : 4+  Left : 4+  Right plantar: normal  Left plantar: normal  Right Gifford: absent  Left Gifford:  absent  Right ankle clonus: absent  Left ankle clonus: absent  Right pendular knee jerk: absent  Left pendular knee jerk: absent    Imaging: (independent review and interpretation)  7/25/2020 8/4/2020       ASSESSMENT and PLAN  Millie Quevedo is a 24 y.o. female with no significant medical history.  She presents today with a new complaint of mid thoracic back pain that occurred post hyperextension injury while on a trampoline on 7/24/2020.  OLINDA: 68.  Physical exam findings of neurologically intact.  Her imaging shows STIR signal change within the vertebral body of T7 to suggest an acute type A1 wedge-shaped compression fracture of the T7 vertebral body.  No significant central canal or foraminal stenosis noted within the thoracic spine.    TREATMENT RECOMMENDATIONS ...  T7 compression fracture  To date, serial imaging of the thoracic spine shows what appears to be a stable, type A1, wedge shaped compression fracture of T7.  I recommended she discontinue use of the LSO brace, as the LUMBOSACRAL brace provides no thoracic support.  I recommended she obtain a TLSO brace.  Previous prescription for said brace was provided by [facility] urgent care.  We will proceed today by obtaining x-rays of the thoracic spine upright and supine to assess for fracture stability.  Rx provided for a short tapering course of Norco.  Benefits, risk, adverse effects, and use discussed.  ADAM report reviewed, no suspicious activity.  I additionally recommended she avoid NSAIDs, as well as avoid lifting greater than 8 pounds, bending, or twisting.  We will have her return in 1 month for reassessment.  Repeat x-rays of the thoracic spine upright and supine prior to arrival.  I advised the patient to call to return sooner for new or worsening complaints of weakness, paresthesias, gait disturbances, or any additional concerns.  Treatment options discussed in detail with Millie and she voiced understanding.  Ms. Quevedo agrees  with this plan of care.    Millie was seen today for back pain.    Diagnoses and all orders for this visit:    Closed wedge compression fracture of T7 vertebra with routine healing  -     XR Spine Thoracic 2 View  -     XR Spine Thoracic 2 View; Future  -     HYDROcodone-acetaminophen (NORCO) 5-325 MG per tablet; 1 tab PO Q 6 hr PRN x 1 wk, 1 tab PO q 8 hr PRN x 1 wk, 1 tab PO q 12 hr PRN x 1 wk    Current non-smoker      Return in about 1 month (around 9/11/2020) for with Wan with repeat xrays.    Thank you for this Consultation and the opportunity to participate in Millie's care.    Sincerely,  Wan Tavarez, APRN    Level of Risk: Moderate due to: undiagnosed new problem  MDM: Moderate Complexity  (Mod = 39611, High = 59192)

## 2020-08-11 ENCOUNTER — HOSPITAL ENCOUNTER (OUTPATIENT)
Dept: GENERAL RADIOLOGY | Facility: HOSPITAL | Age: 25
Discharge: HOME | End: 2020-08-11
Admitting: NURSE PRACTITIONER

## 2020-08-11 ENCOUNTER — OFFICE VISIT (OUTPATIENT)
Dept: NEUROSURGERY | Facility: CLINIC | Age: 25
End: 2020-08-11

## 2020-08-11 VITALS — BODY MASS INDEX: 24.64 KG/M2 | WEIGHT: 176 LBS | HEIGHT: 71 IN

## 2020-08-11 DIAGNOSIS — S22.060D CLOSED WEDGE COMPRESSION FRACTURE OF T7 VERTEBRA WITH ROUTINE HEALING: Primary | ICD-10-CM

## 2020-08-11 DIAGNOSIS — Z78.9 CURRENT NON-SMOKER: ICD-10-CM

## 2020-08-11 PROCEDURE — 99204 OFFICE O/P NEW MOD 45 MIN: CPT | Performed by: NURSE PRACTITIONER

## 2020-08-11 PROCEDURE — 72070 X-RAY EXAM THORAC SPINE 2VWS: CPT

## 2020-08-11 RX ORDER — HYDROCODONE BITARTRATE AND ACETAMINOPHEN 5; 325 MG/1; MG/1
TABLET ORAL
Qty: 63 TABLET | Refills: 0 | Status: SHIPPED | OUTPATIENT
Start: 2020-08-11 | End: 2020-08-26

## 2020-08-11 NOTE — PATIENT INSTRUCTIONS
"DASH Eating Plan  DASH stands for \"Dietary Approaches to Stop Hypertension.\" The DASH eating plan is a healthy eating plan that has been shown to reduce high blood pressure (hypertension). It may also reduce your risk for type 2 diabetes, heart disease, and stroke. The DASH eating plan may also help with weight loss.  What are tips for following this plan?    General guidelines  · Avoid eating more than 2,300 mg (milligrams) of salt (sodium) a day. If you have hypertension, you may need to reduce your sodium intake to 1,500 mg a day.  · Limit alcohol intake to no more than 1 drink a day for nonpregnant women and 2 drinks a day for men. One drink equals 12 oz of beer, 5 oz of wine, or 1½ oz of hard liquor.  · Work with your health care provider to maintain a healthy body weight or to lose weight. Ask what an ideal weight is for you.  · Get at least 30 minutes of exercise that causes your heart to beat faster (aerobic exercise) most days of the week. Activities may include walking, swimming, or biking.  · Work with your health care provider or diet and nutrition specialist (dietitian) to adjust your eating plan to your individual calorie needs.  Reading food labels    · Check food labels for the amount of sodium per serving. Choose foods with less than 5 percent of the Daily Value of sodium. Generally, foods with less than 300 mg of sodium per serving fit into this eating plan.  · To find whole grains, look for the word \"whole\" as the first word in the ingredient list.  Shopping  · Buy products labeled as \"low-sodium\" or \"no salt added.\"  · Buy fresh foods. Avoid canned foods and premade or frozen meals.  Cooking  · Avoid adding salt when cooking. Use salt-free seasonings or herbs instead of table salt or sea salt. Check with your health care provider or pharmacist before using salt substitutes.  · Do not zaman foods. Cook foods using healthy methods such as baking, boiling, grilling, and broiling instead.  · Cook with " heart-healthy oils, such as olive, canola, soybean, or sunflower oil.  Meal planning  · Eat a balanced diet that includes:  ? 5 or more servings of fruits and vegetables each day. At each meal, try to fill half of your plate with fruits and vegetables.  ? Up to 6-8 servings of whole grains each day.  ? Less than 6 oz of lean meat, poultry, or fish each day. A 3-oz serving of meat is about the same size as a deck of cards. One egg equals 1 oz.  ? 2 servings of low-fat dairy each day.  ? A serving of nuts, seeds, or beans 5 times each week.  ? Heart-healthy fats. Healthy fats called Omega-3 fatty acids are found in foods such as flaxseeds and coldwater fish, like sardines, salmon, and mackerel.  · Limit how much you eat of the following:  ? Canned or prepackaged foods.  ? Food that is high in trans fat, such as fried foods.  ? Food that is high in saturated fat, such as fatty meat.  ? Sweets, desserts, sugary drinks, and other foods with added sugar.  ? Full-fat dairy products.  · Do not salt foods before eating.  · Try to eat at least 2 vegetarian meals each week.  · Eat more home-cooked food and less restaurant, buffet, and fast food.  · When eating at a restaurant, ask that your food be prepared with less salt or no salt, if possible.  What foods are recommended?  The items listed may not be a complete list. Talk with your dietitian about what dietary choices are best for you.  Grains  Whole-grain or whole-wheat bread. Whole-grain or whole-wheat pasta. Brown rice. Oatmeal. Quinoa. Bulgur. Whole-grain and low-sodium cereals. Patricia bread. Low-fat, low-sodium crackers. Whole-wheat flour tortillas.  Vegetables  Fresh or frozen vegetables (raw, steamed, roasted, or grilled). Low-sodium or reduced-sodium tomato and vegetable juice. Low-sodium or reduced-sodium tomato sauce and tomato paste. Low-sodium or reduced-sodium canned vegetables.  Fruits  All fresh, dried, or frozen fruit. Canned fruit in natural juice (without  added sugar).  Meat and other protein foods  Skinless chicken or turkey. Ground chicken or turkey. Pork with fat trimmed off. Fish and seafood. Egg whites. Dried beans, peas, or lentils. Unsalted nuts, nut butters, and seeds. Unsalted canned beans. Lean cuts of beef with fat trimmed off. Low-sodium, lean deli meat.  Dairy  Low-fat (1%) or fat-free (skim) milk. Fat-free, low-fat, or reduced-fat cheeses. Nonfat, low-sodium ricotta or cottage cheese. Low-fat or nonfat yogurt. Low-fat, low-sodium cheese.  Fats and oils  Soft margarine without trans fats. Vegetable oil. Low-fat, reduced-fat, or light mayonnaise and salad dressings (reduced-sodium). Canola, safflower, olive, soybean, and sunflower oils. Avocado.  Seasoning and other foods  Herbs. Spices. Seasoning mixes without salt. Unsalted popcorn and pretzels. Fat-free sweets.  What foods are not recommended?  The items listed may not be a complete list. Talk with your dietitian about what dietary choices are best for you.  Grains  Baked goods made with fat, such as croissants, muffins, or some breads. Dry pasta or rice meal packs.  Vegetables  Creamed or fried vegetables. Vegetables in a cheese sauce. Regular canned vegetables (not low-sodium or reduced-sodium). Regular canned tomato sauce and paste (not low-sodium or reduced-sodium). Regular tomato and vegetable juice (not low-sodium or reduced-sodium). Pickles. Olives.  Fruits  Canned fruit in a light or heavy syrup. Fried fruit. Fruit in cream or butter sauce.  Meat and other protein foods  Fatty cuts of meat. Ribs. Fried meat. Barraza. Sausage. Bologna and other processed lunch meats. Salami. Fatback. Hotdogs. Bratwurst. Salted nuts and seeds. Canned beans with added salt. Canned or smoked fish. Whole eggs or egg yolks. Chicken or turkey with skin.  Dairy  Whole or 2% milk, cream, and half-and-half. Whole or full-fat cream cheese. Whole-fat or sweetened yogurt. Full-fat cheese. Nondairy creamers. Whipped toppings.  Processed cheese and cheese spreads.  Fats and oils  Butter. Stick margarine. Lard. Shortening. Ghee. Barraza fat. Tropical oils, such as coconut, palm kernel, or palm oil.  Seasoning and other foods  Salted popcorn and pretzels. Onion salt, garlic salt, seasoned salt, table salt, and sea salt. Worcestershire sauce. Tartar sauce. Barbecue sauce. Teriyaki sauce. Soy sauce, including reduced-sodium. Steak sauce. Canned and packaged gravies. Fish sauce. Oyster sauce. Cocktail sauce. Horseradish that you find on the shelf. Ketchup. Mustard. Meat flavorings and tenderizers. Bouillon cubes. Hot sauce and Tabasco sauce. Premade or packaged marinades. Premade or packaged taco seasonings. Relishes. Regular salad dressings.  Where to find more information:  · National Heart, Lung, and Blood [place]: www.nhlbi.nih.gov  · American Heart Association: www.heart.org  Summary  · The DASH eating plan is a healthy eating plan that has been shown to reduce high blood pressure (hypertension). It may also reduce your risk for type 2 diabetes, heart disease, and stroke.  · With the DASH eating plan, you should limit salt (sodium) intake to 2,300 mg a day. If you have hypertension, you may need to reduce your sodium intake to 1,500 mg a day.  · When on the DASH eating plan, aim to eat more fresh fruits and vegetables, whole grains, lean proteins, low-fat dairy, and heart-healthy fats.  · Work with your health care provider or diet and nutrition specialist (dietitian) to adjust your eating plan to your individual calorie needs.  This information is not intended to replace advice given to you by your health care provider. Make sure you discuss any questions you have with your health care provider.  Document Released: 12/06/2012 Document Revised: 11/30/2018 Document Reviewed: 12/11/2017  Elsevier Patient Education © 2020 Elsevier Inc.      Smoking Tobacco Information, Adult  Smoking tobacco can be harmful to your health. Tobacco contains a  poisonous (toxic), colorless chemical called nicotine. Nicotine is addictive. It changes the brain and can make it hard to stop smoking. Tobacco also has other toxic chemicals that can hurt your body and raise your risk of many cancers.  How can smoking tobacco affect me?  Smoking tobacco puts you at risk for:  · Cancer. Smoking is most commonly associated with lung cancer, but can also lead to cancer in other parts of the body.  · Chronic obstructive pulmonary disease (COPD). This is a long-term lung condition that makes it hard to breathe. It also gets worse over time.  · High blood pressure (hypertension), heart disease, stroke, or heart attack.  · Lung infections, such as pneumonia.  · Cataracts. This is when the lenses in the eyes become clouded.  · Digestive problems. This may include peptic ulcers, heartburn, and gastroesophageal reflux disease (GERD).  · Oral health problems, such as gum disease and tooth loss.  · Loss of taste and smell.  Smoking can affect your appearance by causing:  · Wrinkles.  · Yellow or stained teeth, fingers, and fingernails.  Smoking tobacco can also affect your social life, because:  · It may be challenging to find places to smoke when away from home. Many workplaces, restaurants, hotels, and public places are tobacco-free.  · Smoking is expensive. This is due to the cost of tobacco and the long-term costs of treating health problems from smoking.  · Secondhand smoke may affect those around you. Secondhand smoke can cause lung cancer, breathing problems, and heart disease. Children of smokers have a higher risk for:  ? Sudden infant death syndrome (SIDS).  ? Ear infections.  ? Lung infections.  If you currently smoke tobacco, quitting now can help you:  · Lead a longer and healthier life.  · Look, smell, breathe, and feel better over time.  · Save money.  · Protect others from the harms of secondhand smoke.  What actions can I take to prevent health problems?  Quit smoking    · Do  not start smoking. Quit if you already do.  · Make a plan to quit smoking and commit to it. Look for programs to help you and ask your health care provider for recommendations and ideas.  · Set a date and write down all the reasons you want to quit.  · Let your friends and family know you are quitting so they can help and support you. Consider finding friends who also want to quit. It can be easier to quit with someone else, so that you can support each other.  · Talk with your health care provider about using nicotine replacement medicines to help you quit, such as gum, lozenges, patches, sprays, or pills.  · Do not replace cigarette smoking with electronic cigarettes, which are commonly called e-cigarettes. The safety of e-cigarettes is not known, and some may contain harmful chemicals.  · If you try to quit but return to smoking, stay positive. It is common to slip up when you first quit, so take it one day at a time.  · Be prepared for cravings. When you feel the urge to smoke, chew gum or suck on hard candy.  Lifestyle  · Stay busy and take care of your body.  · Drink enough fluid to keep your urine pale yellow.  · Get plenty of exercise and eat a healthy diet. This can help prevent weight gain after quitting.  · Monitor your eating habits. Quitting smoking can cause you to have a larger appetite than when you smoke.  · Find ways to relax. Go out with friends or family to a movie or a restaurant where people do not smoke.  · Ask your health care provider about having regular tests (screenings) to check for cancer. This may include blood tests, imaging tests, and other tests.  · Find ways to manage your stress, such as meditation, yoga, or exercise.  Where to find support  To get support to quit smoking, consider:  · Asking your health care provider for more information and resources.  · Taking classes to learn more about quitting smoking.  · Looking for local organizations that offer resources about quitting  smoking.  · Joining a support group for people who want to quit smoking in your local community.  · Calling the smokefree.gov counselor helpline: 7-800-Quit-Now (1-508.652.7904)  Where to find more information  You may find more information about quitting smoking from:  · HelpGuide.org: www.helpguide.org  · Smokefree.gov: smokefree.gov  · American Lung Association: www.lung.org  Contact a health care provider if you:  · Have problems breathing.  · Notice that your lips, nose, or fingers turn blue.  · Have chest pain.  · Are coughing up blood.  · Feel faint or you pass out.  · Have other health changes that cause you to worry.  Summary  · Smoking tobacco can negatively affect your health, the health of those around you, your finances, and your social life.  · Do not start smoking. Quit if you already do. If you need help quitting, ask your health care provider.  · Think about joining a support group for people who want to quit smoking in your local community. There are many effective programs that will help you to quit this behavior.  This information is not intended to replace advice given to you by your health care provider. Make sure you discuss any questions you have with your health care provider.  Document Released: 01/02/2018 Document Revised: 02/06/2019 Document Reviewed: 01/02/2018  Elsevier Patient Education © 2020 Elsevier Inc.

## 2020-08-26 ENCOUNTER — OFFICE VISIT (OUTPATIENT)
Dept: FAMILY MEDICINE CLINIC | Facility: CLINIC | Age: 25
End: 2020-08-26

## 2020-08-26 VITALS
TEMPERATURE: 97.9 F | HEART RATE: 100 BPM | RESPIRATION RATE: 18 BRPM | DIASTOLIC BLOOD PRESSURE: 74 MMHG | HEIGHT: 71 IN | WEIGHT: 180.3 LBS | SYSTOLIC BLOOD PRESSURE: 130 MMHG | BODY MASS INDEX: 25.24 KG/M2 | OXYGEN SATURATION: 95 %

## 2020-08-26 DIAGNOSIS — G47.09 OTHER INSOMNIA: ICD-10-CM

## 2020-08-26 DIAGNOSIS — S22.060D CLOSED WEDGE COMPRESSION FRACTURE OF T7 VERTEBRA WITH ROUTINE HEALING: Primary | ICD-10-CM

## 2020-08-26 PROCEDURE — 99213 OFFICE O/P EST LOW 20 MIN: CPT | Performed by: STUDENT IN AN ORGANIZED HEALTH CARE EDUCATION/TRAINING PROGRAM

## 2020-08-26 RX ORDER — TRAZODONE HYDROCHLORIDE 50 MG/1
50 TABLET ORAL NIGHTLY
Qty: 30 TABLET | Refills: 0 | Status: SHIPPED | OUTPATIENT
Start: 2020-08-26 | End: 2020-09-21

## 2020-08-26 RX ORDER — HYDROCODONE BITARTRATE AND ACETAMINOPHEN 5; 325 MG/1; MG/1
1 TABLET ORAL EVERY 8 HOURS PRN
Qty: 90 TABLET | Refills: 0 | Status: SHIPPED | OUTPATIENT
Start: 2020-08-26 | End: 2021-04-01

## 2020-08-26 RX ORDER — KETOROLAC TROMETHAMINE 30 MG/ML
30 INJECTION, SOLUTION INTRAMUSCULAR; INTRAVENOUS EVERY 6 HOURS PRN
Status: SHIPPED | OUTPATIENT
Start: 2020-08-26 | End: 2020-08-31

## 2020-09-04 ENCOUNTER — HOSPITAL ENCOUNTER (OUTPATIENT)
Dept: PHYSICAL THERAPY | Facility: HOSPITAL | Age: 25
Setting detail: THERAPIES SERIES
Discharge: HOME | End: 2020-09-04

## 2020-09-04 DIAGNOSIS — S22.060A CLOSED WEDGE COMPRESSION FRACTURE OF SEVENTH THORACIC VERTEBRA, INITIAL ENCOUNTER: Primary | ICD-10-CM

## 2020-09-04 PROCEDURE — 97162 PT EVAL MOD COMPLEX 30 MIN: CPT | Performed by: PHYSICAL THERAPIST

## 2020-09-04 PROCEDURE — 97110 THERAPEUTIC EXERCISES: CPT | Performed by: PHYSICAL THERAPIST

## 2020-09-04 NOTE — THERAPY EVALUATION
Outpatient Physical Therapy Ortho Initial Evaluation  [facility]     Patient Name: Millie Quevedo  : 1995  MRN: [id number]  Today's Date: 2020      Visit Date: 2020  Visit   Return to MD: BEBO  Re-cert date:     Patient Active Problem List   Diagnosis   • Closed wedge compression fracture of T7 vertebra with routine healing   • Current non-smoker   • Other insomnia        Past Medical History:   Diagnosis Date   • Abnormal Pap smear of cervix    • Acute maxillary sinusitis    • Acute otitis externa    • Acute pharyngitis    • Acute tonsillitis    • Allergic asthma     IgE-mediated allergic asthma     • Allergic rhinitis    • Allergic rhinitis due to pollen    • Anxiety    • Asthma    • Chondromalacia of patella     left knee    • Closed wedge compression fracture of T7 vertebra with routine healing 2020   • Common cold    • Cough    • Diarrhea    • Disorder of gallbladder     Probable biliary dyskinesia    • Epigastric pain    • Foot pain    • Gastroenteritis    • Generalized abdominal pain    • Headache    • History of colonoscopy 2013    Colon endoscopy 06480 (1)  -  Internal & external hemorrhoids found.   • History of esophagogastroduodenoscopy (EGD) 2013    w/ tube 57107 (1)  -  Normal esophagus. Gastritis in stomach. Biopsy taken. Normal duodenum. Biospy taken   • History of marijuana use    • HPV (human papilloma virus) infection    • IBS (irritable bowel syndrome)    • Influenza    • Irregular periods    • Irritable bowel syndrome    • Migraine    • Nausea    • Nausea and vomiting    • Osgood-Schlatter's disease    • Pain in throat    • Patellar tendonitis    • Right upper quadrant pain    • Streptococcal sore throat    • Temporomandibular joint disorder     WISDOM TEETH    • Upper respiratory infection    • Urgency of urination    • Urinary tract infectious disease    • Varicella    • Viral gastroenteritis    • Vulvovaginitis         Past Surgical  History:   Procedure Laterality Date   • CHOLECYSTECTOMY  06/06/2013    Laparoscopic  -  laparoscopic cholecystectomy with intraoperative cholangiogram. Cholecystitis.   • INJECTION OF MEDICATION  01/08/2013    Toradol (1)   -  FLORY Sotomayor   • LAPAROSCOPIC CHOLECYSTECTOMY     • WISDOM TOOTH EXTRACTION         Visit Dx:     ICD-10-CM ICD-9-CM   1. Closed wedge compression fracture of seventh thoracic vertebra, initial encounter (CMS/[facility]) S22.060A 805.2     Medications (Admitted on 9/4/2020)     HYDROcodone-acetaminophen (Norco) 5-325 MG per tablet     meloxicam (MOBIC) 15 MG tablet     predniSONE (DELTASONE) 10 MG tablet     traZODone (DESYREL) 50 MG tablet      Allergies       AspirinShortness Of Breath   Erythromycin BaseRash   LatexRash             PT Ortho     Row Name 09/04/20 1000       Subjective Comments    Subjective Comments  23 yo female with a back flip injury on her trampoline and had a wedge compression fx at T7 on 7/27/2020. Since the injury, feels a burning in the back along interscapular region at upper thoracic spine and intermittent sternal pain. Pain is worst at night. Aggravating factors: sitting tall, picking up 8 month old, rolling over in bed. Easing factors: laying flat in bed, heating pad, pain meds-hydrocodone; Occupation: homeamaker   -BS       Precautions and Contraindications    Precautions  LATEX allergy, T7 wedge compression fx 7/27/2020; no lifting > 8 lbs,   -BS       Subjective Pain    Able to rate subjective pain?  yes  -BS    Pre-Treatment Pain Level  5  -BS    Post-Treatment Pain Level  4  -BS    Subjective Pain Comment  5-8/10 constant upper t-spine pain  -BS       Quarter Clearing    Quarter Clearing  Lower Quarter Clearing  -BS       Neural Tension Signs- Lower Quarter Clearing    SLR  Bilateral:;Negative  -BS       Sensory Screen for Light Touch- Lower Quarter Clearing    L1 (inguinal area)  Bilateral:;Intact  -BS    L2 (anterior mid thigh)  Bilateral:;Intact  -BS    L3  (distal anterior thigh)  Bilateral:;Intact  -BS    L4 (medial lower leg/foot)  Bilateral:;Intact  -BS    L5 (lateral lower leg/great toe)  Bilateral:;Intact  -BS    S1 (bottom of foot)  Bilateral:;Intact  -BS       Myotomal Screen- Lower Quarter Clearing    Hip flexion (L2)  Bilateral:;5 (Normal)  -BS    Knee extension (L3)  Bilateral:;5 (Normal)  -BS    Ankle DF (L4)  Bilateral:;5 (Normal)  -BS    Great toe extension (L5)  Bilateral:;5 (Normal)  -BS    Ankle PF (S1)  Bilateral:;5 (Normal)  -BS    Knee flexion (S2)  Bilateral:;5 (Normal)  -BS       Lumbar ROM Screen- Lower Quarter Clearing    Lumbar Flexion  Impaired mild limit 75%  -BS    Lumbar Extension  Impaired 50% mod limit  -BS    Lumbar Lateral Flexion  Impaired R WFL, L 75% min limit-T7 pain elicited  -BS    Lumbar Rotation  Impaired 75% min limit b-pain elicited w/ L rot along T7  -BS       General ROM    GENERAL ROM COMMENTS  thoracic spine AROM: flex/ext 25% severe limit, lateral flex B 50% w/ sharp t-spine pain bilat at end range  -BS       MMT (Manual Muscle Testing)    General MMT Comments  B UE myotomes: C4-T1 5/5, painful t-spine with resisted B shoulder flex   -BS      User Key  (r) = Recorded By, (t) = Taken By, (c) = Cosigned By    Initials Name Provider Type    Jonathan Borrego, PT Physical Therapist                            PT OP Goals     Row Name 09/04/20 1000          PT Short Term Goals    STG Date to Achieve  09/18/20  -BS     STG 1  Pt indep with HEP for core strengthening and mobility of t-spine/LE stretches  -BS     STG 1 Progress  New  -BS     STG 2  Reduce thoracic spine pain by 25% with performing ADL's  -BS     STG 2 Progress  New  -BS     STG 3  Improve thoracic spine AROM (flex/ext) to minimal limit 75%  -BS     STG 3 Progress  New  -BS        Long Term Goals    LTG Date to Achieve  10/02/20  -BS     LTG 1  Reduce thoracic spine pain by 75% with performing ADL's  -BS     LTG 1 Progress  New  -BS     LTG 2  Improve thoracic  spine AROM (flex/ext) to WFL-100%  -BS     LTG 2 Progress  New  -BS     LTG 3  Improve Modified Oswestry score to </=52%  -BS     LTG 3 Progress  New  -BS        Time Calculation    PT Goal Re-Cert Due Date  09/25/20  -BS       User Key  (r) = Recorded By, (t) = Taken By, (c) = Cosigned By    Initials Name Provider Type    BS Jonathan Sagastume, PT Physical Therapist          PT Assessment/Plan     Row Name 09/04/20 1023          PT Assessment    Functional Limitations  Limitation in home management;Performance in sport activities;Performance in work activities;Performance in leisure activities  -BS     Impairments  Impaired flexibility;Muscle strength;Pain;Range of motion;Endurance  -BS     Assessment Comments  Acute midback region pain s/p recent T7 wedge compression fracture while backflipping on her trampoline.  Pt limited by pain, muscle guarding, limited ROM with t-spine (especially flex/ext) and hamstring tightness and core trunk weakness.  -BS     Please refer to paper survey for additional self-reported information  Yes  -BS     Rehab Potential  Good  -BS     Patient/caregiver participated in establishment of treatment plan and goals  Yes  -BS     Patient would benefit from skilled therapy intervention  Yes  -BS        PT Plan    PT Frequency  2x/week  -BS     Predicted Duration of Therapy Intervention (Therapy Eval)  4 weeks  -BS     Planned CPT's?  PT EVAL MOD COMPLELITY: 77128;PT RE-EVAL: 38769;PT THER PROC EA 15 MIN: 91606;PT MANUAL THERAPY EA 15 MIN: 98368;PT NEUROMUSC RE-EDUCATION EA 15 MIN: 04140;PT ELECTRICAL STIM UNATTEND: ;PT ULTRASOUND EA 15 MIN: 53741;PT HOT/COLD PACK WC NONMCARE: 01990;PT THER SUPP EA 15 MIN  -BS     Physical Therapy Interventions (Optional Details)  balance training;home exercise program;lumbar stabilization;manual therapy techniques;modalities;patient/family education;ROM (Range of Motion);strengthening;stretching;swiss ball techniques  -BS     PT Plan Comments  address core  "trunk strengthening, hamstring flexibility. Modalities prn (ice/IFC) for pain management. Address stretching lumbar paraspinals, gentle thoracic mobility as able. Attempt doorway pec S, review resisted rows and gentle LTR's.   -BS       User Key  (r) = Recorded By, (t) = Taken By, (c) = Cosigned By    Initials Name Provider Type    BS Jonathan Sagastume, PT Physical Therapist            OP Exercises     Row Name 09/04/20 1000             Precautions    Existing Precautions/Restrictions  other (see comments) LATEX allergy, no lifting > 8 lbs  -BS         Subjective Comments    Subjective Comments  25 yo female with a back flip injury on her trampoline and had a wedge compression fx at T7 on 7/27/2020. Since the injury, feels a burning in the back along interscapular region at upper thoracic spine and intermittent sternal pain. Pain is worst at night. Aggravating factors: sitting tall, picking up 8 month old, rolling over in bed. Easing factors: laying flat in bed, heating pad, pain meds-hydrocodone; Occupation: homeamaker   -BS         Subjective Pain    Able to rate subjective pain?  yes  -BS      Pre-Treatment Pain Level  5  -BS      Post-Treatment Pain Level  4  -BS      Subjective Pain Comment  5-8/10 constant upper t-spine pain  -BS         Exercise 1    Exercise Name 1  Supine HS S  -BS      Sets 1  1  -BS      Reps 1  1  -BS      Time 1  30\" hold  -BS         Exercise 2    Exercise Name 2  Hooklying PPT  -BS      Sets 2  1  -BS      Reps 2  10  -BS      Time 2  5\" hold  -BS         Exercise 3    Exercise Name 3  resisted mid rows with red TB  -BS      Sets 3  1  -BS      Reps 3  20  -BS         Exercise 4    Exercise Name 4  resisted B shoulder ext w/ red TB  -BS      Sets 4  1  -BS      Reps 4  20  -BS         Exercise 5    Exercise Name 5  SKC S  -BS      Sets 5  1  -BS      Reps 5  1  -BS      Time 5  30\" hold  -BS      Additional Comments  bilat  -BS        User Key  (r) = Recorded By, (t) = Taken By, (c) = " Cosigned By    Initials Name Provider Type    BS Jonathan Sagastume, PT Physical Therapist                        Outcome Measure Options: Modifed Owestry  Modified Oswestry  Modified Oswestry Score/Comments: 31/50-62%      Time Calculation:     Start Time: 1023  Stop Time: 1106  Time Calculation (min): 43 min  Total Timed Code Minutes- PT: 43 minute(s)     Therapy Charges for Today     Code Description Service Date Service Provider Modifiers Qty    64687845925 HC PT EVAL MOD COMPLEXITY 2 9/4/2020 Jonathan Sagastume, PT GP 1    52660281106 HC PT THER PROC EA 15 MIN 9/4/2020 Jonathan Sagastume, PT GP 1          PT G-Codes  Outcome Measure Options: Modifed Owestry  Modified Oswestry Score/Comments: 31/50-62%         Jonathan Sagastume, PT  9/4/2020

## 2020-09-09 ENCOUNTER — TELEPHONE (OUTPATIENT)
Dept: NEUROSURGERY | Facility: CLINIC | Age: 25
End: 2020-09-09

## 2020-09-09 NOTE — TELEPHONE ENCOUNTER
Left vm for patient to call me back to confirm. I provided a detailed message advising patient to be sure to have her xrays in her brace before her apt.

## 2020-09-15 NOTE — PROGRESS NOTES
I have reviewed the notes, assessments, and/or procedures performed by Shelton Corrigan MD during office visit. I concur with her/his documentation and assessment and plan for Millie Quevedo.      This document has been electronically signed by Niels Lock MD on September 15, 2020 11:21 CDT

## 2020-09-16 ENCOUNTER — TELEPHONE (OUTPATIENT)
Dept: NEUROSURGERY | Facility: CLINIC | Age: 25
End: 2020-09-16

## 2020-09-16 NOTE — TELEPHONE ENCOUNTER
Left vm for patient to call back and reschedule her missed appt with Wan.    *hub ok to reschedule to first available*

## 2020-09-18 DIAGNOSIS — G47.09 OTHER INSOMNIA: ICD-10-CM

## 2020-09-21 RX ORDER — TRAZODONE HYDROCHLORIDE 50 MG/1
TABLET ORAL
Qty: 30 TABLET | Refills: 0 | Status: SHIPPED | OUTPATIENT
Start: 2020-09-21 | End: 2020-10-19 | Stop reason: SDUPTHER

## 2020-10-19 DIAGNOSIS — G47.09 OTHER INSOMNIA: ICD-10-CM

## 2020-10-19 RX ORDER — TRAZODONE HYDROCHLORIDE 50 MG/1
50 TABLET ORAL
Qty: 30 TABLET | Refills: 0 | Status: SHIPPED | OUTPATIENT
Start: 2020-10-19 | End: 2020-11-18 | Stop reason: SDUPTHER

## 2020-11-18 DIAGNOSIS — G47.09 OTHER INSOMNIA: ICD-10-CM

## 2020-11-18 RX ORDER — TRAZODONE HYDROCHLORIDE 50 MG/1
50 TABLET ORAL
Qty: 30 TABLET | Refills: 0 | Status: SHIPPED | OUTPATIENT
Start: 2020-11-18 | End: 2020-11-20 | Stop reason: SDUPTHER

## 2020-11-20 ENCOUNTER — TELEPHONE (OUTPATIENT)
Dept: FAMILY MEDICINE CLINIC | Facility: CLINIC | Age: 25
End: 2020-11-20

## 2020-11-20 ENCOUNTER — PATIENT OUTREACH (OUTPATIENT)
Dept: FAMILY MEDICINE CLINIC | Facility: CLINIC | Age: 25
End: 2020-11-20

## 2020-11-20 DIAGNOSIS — G47.09 OTHER INSOMNIA: ICD-10-CM

## 2020-11-20 RX ORDER — TRAZODONE HYDROCHLORIDE 50 MG/1
50 TABLET ORAL
Qty: 30 TABLET | Refills: 0 | Status: SHIPPED | OUTPATIENT
Start: 2020-11-20 | End: 2021-01-18

## 2020-11-20 NOTE — TELEPHONE ENCOUNTER
TRIED TO CALL PATIENT TO MAKE APPOINTMENT FOR INSOMINA AND PAIN. WAS UNABLE TO REACH BUT LEFT VM X1.        THANK YOU,      VIANCA

## 2021-01-15 DIAGNOSIS — G47.09 OTHER INSOMNIA: Primary | ICD-10-CM

## 2021-01-18 RX ORDER — TRAZODONE HYDROCHLORIDE 50 MG/1
TABLET ORAL
Qty: 30 TABLET | Refills: 2 | Status: SHIPPED | OUTPATIENT
Start: 2021-01-18 | End: 2021-02-10 | Stop reason: SDUPTHER

## 2021-02-10 ENCOUNTER — TELEPHONE (OUTPATIENT)
Dept: FAMILY MEDICINE CLINIC | Facility: CLINIC | Age: 26
End: 2021-02-10

## 2021-02-10 DIAGNOSIS — G47.09 OTHER INSOMNIA: ICD-10-CM

## 2021-02-10 RX ORDER — TRAZODONE HYDROCHLORIDE 50 MG/1
50 TABLET ORAL
Qty: 30 TABLET | Refills: 2 | Status: SHIPPED | OUTPATIENT
Start: 2021-02-10 | End: 2021-04-01

## 2021-02-16 ENCOUNTER — TELEPHONE (OUTPATIENT)
Dept: FAMILY MEDICINE CLINIC | Facility: CLINIC | Age: 26
End: 2021-02-16

## 2021-02-16 NOTE — TELEPHONE ENCOUNTER
TRIED TO CALL PATIENT THIS MORNING REGARDING HER APPOINTMENT; NO VOICEMAIL AVAILABLE X1.        THANK YOU,        VIANCA

## 2021-02-17 ENCOUNTER — TELEPHONE (OUTPATIENT)
Dept: FAMILY MEDICINE CLINIC | Facility: CLINIC | Age: 26
End: 2021-02-17

## 2021-04-01 ENCOUNTER — OFFICE VISIT (OUTPATIENT)
Dept: FAMILY MEDICINE CLINIC | Facility: CLINIC | Age: 26
End: 2021-04-01

## 2021-04-01 VITALS
SYSTOLIC BLOOD PRESSURE: 122 MMHG | HEART RATE: 93 BPM | BODY MASS INDEX: 24.06 KG/M2 | WEIGHT: 171.9 LBS | HEIGHT: 71 IN | OXYGEN SATURATION: 98 % | DIASTOLIC BLOOD PRESSURE: 70 MMHG

## 2021-04-01 DIAGNOSIS — S22.060D CLOSED WEDGE COMPRESSION FRACTURE OF T7 VERTEBRA WITH ROUTINE HEALING: Primary | ICD-10-CM

## 2021-04-01 DIAGNOSIS — G47.09 OTHER INSOMNIA: ICD-10-CM

## 2021-04-01 PROCEDURE — 99213 OFFICE O/P EST LOW 20 MIN: CPT | Performed by: STUDENT IN AN ORGANIZED HEALTH CARE EDUCATION/TRAINING PROGRAM

## 2021-04-01 RX ORDER — CLONIDINE HYDROCHLORIDE 0.2 MG/1
0.2 TABLET ORAL NIGHTLY
Qty: 30 TABLET | Refills: 0 | Status: SHIPPED | OUTPATIENT
Start: 2021-04-01 | End: 2021-04-23

## 2021-04-02 ENCOUNTER — TELEPHONE (OUTPATIENT)
Dept: FAMILY MEDICINE CLINIC | Facility: CLINIC | Age: 26
End: 2021-04-02

## 2021-04-02 NOTE — TELEPHONE ENCOUNTER
Patient called to let us know that her insurance is not covering the compound that was called in for her back yesterday.  She was unsure of name of the medication.    The only medication called in yesterday was the cloNIDine (Catapres) 0.2 MG tablet.    Call back number for patient is [phone].    Thanks,  Janet

## 2021-04-06 NOTE — PROGRESS NOTES
Family Medicine Residency  Shelton Corrigan MD    Subjective:     Millie Quevedo is a 25 y.o. female who presents for T7 compression fracture and insomnia.    T7 Compression Fracture  Date of injury: 7/24/2020.  MRI showed mild compression fracture of the T7 superior endplate.  She has seen neurosurgery which did not recommend surgical treatment.  She reports her pain has been very well controlled and she had discontinued her Norco.  Over the last month, she has started having more pain.  Particularly worsens after standing for approximately 10 minutes.  She describes as a burning, numbness sensation around the fracture site.    Insomnia  She was prescribed trazodone 50 mg.  She has titrated her dose of 250 mg and still continues have issues also with falling asleep.  She does wake up multiple times at night as well.  She reports chronic issues with sleeping since it was a child.  She reports good sleep hygiene.    The following portions of the patient's history were reviewed and updated as appropriate: allergies, current medications, past family history, past medical history, past social history, past surgical history and problem list.    Past Medical Hx:  Past Medical History:   Diagnosis Date   • Abnormal Pap smear of cervix    • Acute maxillary sinusitis    • Acute otitis externa    • Acute pharyngitis    • Acute tonsillitis    • Allergic asthma     IgE-mediated allergic asthma     • Allergic rhinitis    • Allergic rhinitis due to pollen    • Anxiety    • Asthma    • Chondromalacia of patella     left knee    • Closed wedge compression fracture of T7 vertebra with routine healing 8/11/2020   • Common cold    • Cough    • Diarrhea    • Disorder of gallbladder     Probable biliary dyskinesia    • Epigastric pain    • Foot pain    • Gastroenteritis    • Generalized abdominal pain    • Headache    • History of colonoscopy 03/27/2013    Colon endoscopy 89723 (1)  -  Internal & external hemorrhoids found.   •  History of esophagogastroduodenoscopy (EGD) 03/27/2013    w/ tube 32664 (1)  -  Normal esophagus. Gastritis in stomach. Biopsy taken. Normal duodenum. Biospy taken   • History of marijuana use    • HPV (human papilloma virus) infection    • IBS (irritable bowel syndrome)    • Influenza    • Irregular periods    • Irritable bowel syndrome    • Migraine    • Nausea    • Nausea and vomiting    • Osgood-Schlatter's disease    • Pain in throat    • Patellar tendonitis    • Right upper quadrant pain    • Streptococcal sore throat    • Temporomandibular joint disorder     WISDOM TEETH    • Upper respiratory infection    • Urgency of urination    • Urinary tract infectious disease    • Varicella    • Viral gastroenteritis    • Vulvovaginitis        Past Surgical Hx:  Past Surgical History:   Procedure Laterality Date   • CHOLECYSTECTOMY  06/06/2013    Laparoscopic  -  laparoscopic cholecystectomy with intraoperative cholangiogram. Cholecystitis.   • INJECTION OF MEDICATION  01/08/2013    Toradol (1)   -  FLORY Sotomayor   • LAPAROSCOPIC CHOLECYSTECTOMY     • WISDOM TOOTH EXTRACTION         Current Meds:    Current Outpatient Medications:   •  cloNIDine (Catapres) 0.2 MG tablet, Take 1 tablet by mouth Every Night., Disp: 30 tablet, Rfl: 0    Allergies:  Allergies   Allergen Reactions   • Aspirin Shortness Of Breath     TIGHT CHEST   • Erythromycin Base Rash   • Latex Rash     Rash        Family Hx:  Family History   Adopted: Yes   Problem Relation Age of Onset   • Cancer Other    • Diabetes Other    • Heart disease Other    • Hypertension Other    • Stroke Other    • Lung disease Other    • Cholelithiasis Other    • Ulcers Other    • Other Other         Colon problems   • Ovarian cysts Mother    • Bipolar disorder Mother    • Irritable bowel syndrome Mother    • Ovarian cancer Mother    • No Known Problems Sister    • Emphysema Maternal Grandmother    • Heart attack Maternal Grandfather    • No Known Problems Sister    • No Known  "Problems Sister         Social History:  Social History     Socioeconomic History   • Marital status:      Spouse name: Not on file   • Number of children: Not on file   • Years of education: Not on file   • Highest education level: Not on file   Tobacco Use   • Smoking status: Never Smoker   • Smokeless tobacco: Never Used   Substance and Sexual Activity   • Alcohol use: No   • Drug use: Yes     Types: Marijuana     Comment: stopped with positive pregnancy test    • Sexual activity: Yes     Partners: Male     Birth control/protection: None     Comment: last pap smear 5/17/19 ASCUS        Review of Systems  Review of Systems   Constitutional: Negative for activity change, appetite change, fatigue and fever.   HENT: Negative for congestion, rhinorrhea, sinus pain and sore throat.    Eyes: Negative for pain, redness and visual disturbance.   Respiratory: Negative for cough, shortness of breath and wheezing.    Cardiovascular: Negative for chest pain, palpitations and leg swelling.   Gastrointestinal: Negative for abdominal pain, constipation, diarrhea, nausea and vomiting.   Genitourinary: Negative for dysuria and flank pain.   Musculoskeletal: Positive for back pain (thoracic). Negative for arthralgias, joint swelling and myalgias.   Skin: Negative for color change, pallor and rash.   Neurological: Negative for dizziness, light-headedness and headaches.   Psychiatric/Behavioral: Positive for sleep disturbance. Negative for dysphoric mood. The patient is not nervous/anxious.        Objective:     /70   Pulse 93   Ht 180.3 cm (71\")   Wt 78 kg (171 lb 14.4 oz)   SpO2 98%   BMI 23.98 kg/m²   Physical Exam  Vitals and nursing note reviewed.   Constitutional:       General: She is not in acute distress.     Appearance: Normal appearance. She is well-developed. She is not diaphoretic.   HENT:      Head: Normocephalic and atraumatic.      Right Ear: Hearing normal.      Left Ear: Hearing normal.   Eyes:     "  General: Lids are normal.      Conjunctiva/sclera: Conjunctivae normal.   Cardiovascular:      Rate and Rhythm: Normal rate and regular rhythm.      Heart sounds: Normal heart sounds. No murmur heard.     Pulmonary:      Effort: Pulmonary effort is normal. No respiratory distress.      Breath sounds: Normal breath sounds. No wheezing or rales.   Abdominal:      General: Bowel sounds are normal. There is no distension.      Palpations: Abdomen is soft.      Tenderness: There is no abdominal tenderness.   Musculoskeletal:         General: No deformity. Normal range of motion.      Thoracic back: Tenderness (T7) present. No swelling.   Skin:     General: Skin is warm and dry.      Coloration: Skin is not pale.      Findings: No erythema or rash.   Neurological:      Mental Status: She is alert and oriented to person, place, and time. She is not disoriented.          Assessment/Plan:     Diagnoses and all orders for this visit:    1. Closed wedge compression fracture of T7 vertebra with routine healing (Primary)    2. Other insomnia  -     cloNIDine (Catapres) 0.2 MG tablet; Take 1 tablet by mouth Every Night.  Dispense: 30 tablet; Refill: 0    Thoracic pain at the T7 area corresponds to the T7 compression fracture.  Given the description of symptoms, likely irritation of nerves.  We will send in magnesium/lidocaine compound from compounding formulary.  If symptoms persist, consider repeat x-ray or MRI.    Insomnia issues are worse falling asleep.  Will discontinue trazodone.  We will start clonidine 0.2 mg instead.  Can titrate dose as needed.  PHQ score 7 and has good sleep hygiene.    · Rx changes: d/c trazodone, start clonidine, magnesium/lidocaine compound  · Patient Education: see a/p  · Compliance at present is estimated to be good.     Depression screening: Up to date; last screen 4/1/2021     Follow-up:     Return in about 4 weeks (around 4/29/2021) for Recheck.    Preventative:  Health Maintenance   Topic  Date Due   • ANNUAL PHYSICAL  Never done   • HPV VACCINES (1 - 2-dose series) Never done   • INFLUENZA VACCINE  08/01/2021   • PAP SMEAR  05/17/2022   • TDAP/TD VACCINES (2 - Td) 11/14/2029   • HEPATITIS C SCREENING  Completed   • Pneumococcal Vaccine 0-64  Aged Out   • MENINGOCOCCAL VACCINE  Aged Out     Female Preventative: PAP is up to date  Recommended: HPV  Vaccine Counseling: N/A    Weight  -Class: Normal: 18.5-24.9kg/m2  -Patient's Body mass index is 23.98 kg/m². BMI is within normal parameters. No follow-up required..   eat more fruits and vegetables, decrease soda or juice intake, increase water intake, increase physical activity, reduce screen time, reduce portion size and cut out extra servings    Alcohol use:  reports no history of alcohol use.  Nicotine status  reports that she has never smoked. She has never used smokeless tobacco.    Goals     • Less pain      T7 compression fracture            RISK SCORE: 3      Shelton Corrigan M.D. PGY2  [facility]idency  [address] [place] [address]  Office: [phone]  This document has been electronically signed by Shelton Corrigan MD on April 6, 2021 15:06 CDT

## 2021-04-06 NOTE — PROGRESS NOTES
I have reviewed the patient.  I have reviewed the notes, assessments, and/or procedures performed by Dr Shelton Corrigan, I concur with his  documentation and assessment and plan for Millie Quevedo.          This document has been electronically signed by Frank Abreu MD on April 6, 2021 15:47 CDT

## 2021-04-08 NOTE — TELEPHONE ENCOUNTER
I did call the patient she said the medication cost way to much and was wondering if you would send her something else in.      Let me know when you do so I can call and let her know.      Thank you,  Mariela

## 2021-04-12 ENCOUNTER — TELEPHONE (OUTPATIENT)
Dept: FAMILY MEDICINE CLINIC | Facility: CLINIC | Age: 26
End: 2021-04-12

## 2021-04-12 NOTE — TELEPHONE ENCOUNTER
PATIENT WOULD LIKE TO KNOW IF PCP WOULD BE WILLING TO WRITE A LETTER THAT STATES HER DOG IS HER EMOTIONAL SUPPORT ANIMAL FOR HER ANXIETY. SHE NEEDS IT SO SHE CAN HER DOG TO HER APT.    CALL BACK NUMBER FOR PATIENT [phone].    THANKS,  LINNEA

## 2021-04-12 NOTE — TELEPHONE ENCOUNTER
I call the pt and she said that her landlord said that the doctor needs to make a letter stating that she needs her dog. And her pain was must better she was disappointed that it was not covered and I mention to her that she can get something from [employer] that could help with the pain and she just said to please let her know what we can do and when the letter gets done as well. THANKS!

## 2021-04-16 ENCOUNTER — APPOINTMENT (OUTPATIENT)
Dept: CT IMAGING | Facility: HOSPITAL | Age: 26
End: 2021-04-16

## 2021-04-16 ENCOUNTER — HOSPITAL ENCOUNTER (EMERGENCY)
Facility: HOSPITAL | Age: 26
Discharge: HOME | End: 2021-04-16
Attending: FAMILY MEDICINE | Admitting: FAMILY MEDICINE

## 2021-04-16 VITALS
HEIGHT: 71 IN | WEIGHT: 175 LBS | BODY MASS INDEX: 24.5 KG/M2 | OXYGEN SATURATION: 98 % | HEART RATE: 80 BPM | DIASTOLIC BLOOD PRESSURE: 63 MMHG | SYSTOLIC BLOOD PRESSURE: 119 MMHG | RESPIRATION RATE: 16 BRPM | TEMPERATURE: 98.7 F

## 2021-04-16 DIAGNOSIS — R10.9 ABDOMINAL PAIN, UNSPECIFIED ABDOMINAL LOCATION: Primary | ICD-10-CM

## 2021-04-16 DIAGNOSIS — K59.00 CONSTIPATION, UNSPECIFIED CONSTIPATION TYPE: ICD-10-CM

## 2021-04-16 DIAGNOSIS — E87.6 HYPOKALEMIA: ICD-10-CM

## 2021-04-16 LAB
ALBUMIN SERPL-MCNC: 4.8 G/DL (ref 3.5–5.2)
ALBUMIN/GLOB SERPL: 1.5 G/DL
ALP SERPL-CCNC: 87 U/L (ref 39–117)
ALT SERPL W P-5'-P-CCNC: 20 U/L (ref 1–33)
ANION GAP SERPL CALCULATED.3IONS-SCNC: 10 MMOL/L (ref 5–15)
AST SERPL-CCNC: 15 U/L (ref 1–32)
BACTERIA UR QL AUTO: ABNORMAL /HPF
BASOPHILS # BLD AUTO: 0.05 10*3/MM3 (ref 0–0.2)
BASOPHILS NFR BLD AUTO: 0.3 % (ref 0–1.5)
BILIRUB SERPL-MCNC: 0.6 MG/DL (ref 0–1.2)
BILIRUB UR QL STRIP: NEGATIVE
BUN SERPL-MCNC: 11 MG/DL (ref 6–20)
BUN/CREAT SERPL: 15.9 (ref 7–25)
CALCIUM SPEC-SCNC: 9.7 MG/DL (ref 8.6–10.5)
CHLORIDE SERPL-SCNC: 103 MMOL/L (ref 98–107)
CLARITY UR: ABNORMAL
CO2 SERPL-SCNC: 24 MMOL/L (ref 22–29)
COLOR UR: YELLOW
CREAT SERPL-MCNC: 0.69 MG/DL (ref 0.57–1)
DEPRECATED RDW RBC AUTO: 41.1 FL (ref 37–54)
EOSINOPHIL # BLD AUTO: 0.1 10*3/MM3 (ref 0–0.4)
EOSINOPHIL NFR BLD AUTO: 0.7 % (ref 0.3–6.2)
ERYTHROCYTE [DISTWIDTH] IN BLOOD BY AUTOMATED COUNT: 12.6 % (ref 12.3–15.4)
GFR SERPL CREATININE-BSD FRML MDRD: 103 ML/MIN/1.73
GLOBULIN UR ELPH-MCNC: 3.2 GM/DL
GLUCOSE SERPL-MCNC: 85 MG/DL (ref 65–99)
GLUCOSE UR STRIP-MCNC: NEGATIVE MG/DL
HCG SERPL QL: NEGATIVE
HCT VFR BLD AUTO: 45.7 % (ref 34–46.6)
HGB BLD-MCNC: 15 G/DL (ref 12–15.9)
HGB UR QL STRIP.AUTO: NEGATIVE
HOLD SPECIMEN: NORMAL
HYALINE CASTS UR QL AUTO: ABNORMAL /LPF
IMM GRANULOCYTES # BLD AUTO: 0.04 10*3/MM3 (ref 0–0.05)
IMM GRANULOCYTES NFR BLD AUTO: 0.3 % (ref 0–0.5)
KETONES UR QL STRIP: NEGATIVE
LEUKOCYTE ESTERASE UR QL STRIP.AUTO: ABNORMAL
LIPASE SERPL-CCNC: 14 U/L (ref 13–60)
LYMPHOCYTES # BLD AUTO: 1.45 10*3/MM3 (ref 0.7–3.1)
LYMPHOCYTES NFR BLD AUTO: 9.5 % (ref 19.6–45.3)
MCH RBC QN AUTO: 29.1 PG (ref 26.6–33)
MCHC RBC AUTO-ENTMCNC: 32.8 G/DL (ref 31.5–35.7)
MCV RBC AUTO: 88.7 FL (ref 79–97)
MONOCYTES # BLD AUTO: 0.6 10*3/MM3 (ref 0.1–0.9)
MONOCYTES NFR BLD AUTO: 3.9 % (ref 5–12)
NEUTROPHILS NFR BLD AUTO: 13.02 10*3/MM3 (ref 1.7–7)
NEUTROPHILS NFR BLD AUTO: 85.3 % (ref 42.7–76)
NITRITE UR QL STRIP: NEGATIVE
NRBC BLD AUTO-RTO: 0 /100 WBC (ref 0–0.2)
PH UR STRIP.AUTO: 5.5 [PH] (ref 5–9)
PLATELET # BLD AUTO: 268 10*3/MM3 (ref 140–450)
PMV BLD AUTO: 10.2 FL (ref 6–12)
POTASSIUM SERPL-SCNC: 3.3 MMOL/L (ref 3.5–5.2)
PROT SERPL-MCNC: 8 G/DL (ref 6–8.5)
PROT UR QL STRIP: NEGATIVE
RBC # BLD AUTO: 5.15 10*6/MM3 (ref 3.77–5.28)
RBC # UR: ABNORMAL /HPF
REF LAB TEST METHOD: ABNORMAL
SODIUM SERPL-SCNC: 137 MMOL/L (ref 136–145)
SP GR UR STRIP: 1.02 (ref 1–1.03)
SQUAMOUS #/AREA URNS HPF: ABNORMAL /HPF
UROBILINOGEN UR QL STRIP: ABNORMAL
WBC # BLD AUTO: 15.26 10*3/MM3 (ref 3.4–10.8)
WBC UR QL AUTO: ABNORMAL /HPF
WHOLE BLOOD HOLD SPECIMEN: NORMAL
WHOLE BLOOD HOLD SPECIMEN: NORMAL

## 2021-04-16 PROCEDURE — 80053 COMPREHEN METABOLIC PANEL: CPT | Performed by: FAMILY MEDICINE

## 2021-04-16 PROCEDURE — 84703 CHORIONIC GONADOTROPIN ASSAY: CPT | Performed by: FAMILY MEDICINE

## 2021-04-16 PROCEDURE — 99283 EMERGENCY DEPT VISIT LOW MDM: CPT

## 2021-04-16 PROCEDURE — 85025 COMPLETE CBC W/AUTO DIFF WBC: CPT

## 2021-04-16 PROCEDURE — 96375 TX/PRO/DX INJ NEW DRUG ADDON: CPT

## 2021-04-16 PROCEDURE — 96361 HYDRATE IV INFUSION ADD-ON: CPT

## 2021-04-16 PROCEDURE — 96374 THER/PROPH/DIAG INJ IV PUSH: CPT

## 2021-04-16 PROCEDURE — 81001 URINALYSIS AUTO W/SCOPE: CPT

## 2021-04-16 PROCEDURE — 25010000002 ONDANSETRON PER 1 MG: Performed by: FAMILY MEDICINE

## 2021-04-16 PROCEDURE — 25010000002 MORPHINE PER 10 MG: Performed by: FAMILY MEDICINE

## 2021-04-16 PROCEDURE — 74176 CT ABD & PELVIS W/O CONTRAST: CPT

## 2021-04-16 PROCEDURE — 83690 ASSAY OF LIPASE: CPT | Performed by: FAMILY MEDICINE

## 2021-04-16 RX ORDER — ONDANSETRON 2 MG/ML
4 INJECTION INTRAMUSCULAR; INTRAVENOUS ONCE
Status: COMPLETED | OUTPATIENT
Start: 2021-04-16 | End: 2021-04-16

## 2021-04-16 RX ORDER — METOCLOPRAMIDE 10 MG/1
10 TABLET ORAL
Qty: 20 TABLET | Refills: 0 | Status: SHIPPED | OUTPATIENT
Start: 2021-04-16 | End: 2021-07-31

## 2021-04-16 RX ORDER — POLYETHYLENE GLYCOL 3350 17 G/17G
17 POWDER, FOR SOLUTION ORAL DAILY PRN
Qty: 12 EACH | Refills: 0 | Status: SHIPPED | OUTPATIENT
Start: 2021-04-16 | End: 2021-04-23

## 2021-04-16 RX ORDER — POTASSIUM CHLORIDE 750 MG/1
10 TABLET, FILM COATED, EXTENDED RELEASE ORAL 2 TIMES DAILY
Qty: 20 TABLET | Refills: 0 | Status: SHIPPED | OUTPATIENT
Start: 2021-04-16 | End: 2021-07-31

## 2021-04-16 RX ORDER — SODIUM CHLORIDE 9 MG/ML
125 INJECTION, SOLUTION INTRAVENOUS CONTINUOUS
Status: DISCONTINUED | OUTPATIENT
Start: 2021-04-16 | End: 2021-04-16 | Stop reason: HOSPADM

## 2021-04-16 RX ORDER — DICYCLOMINE HCL 20 MG
20 TABLET ORAL EVERY 6 HOURS PRN
Qty: 30 TABLET | Refills: 0 | Status: SHIPPED | OUTPATIENT
Start: 2021-04-16 | End: 2021-07-31

## 2021-04-16 RX ORDER — SODIUM CHLORIDE 0.9 % (FLUSH) 0.9 %
10 SYRINGE (ML) INJECTION AS NEEDED
Status: DISCONTINUED | OUTPATIENT
Start: 2021-04-16 | End: 2021-04-16 | Stop reason: HOSPADM

## 2021-04-16 RX ADMIN — SODIUM CHLORIDE 125 ML/HR: 900 INJECTION, SOLUTION INTRAVENOUS at 17:45

## 2021-04-16 RX ADMIN — ONDANSETRON 4 MG: 2 INJECTION INTRAMUSCULAR; INTRAVENOUS at 16:25

## 2021-04-16 RX ADMIN — MORPHINE SULFATE 4 MG: 4 INJECTION, SOLUTION INTRAMUSCULAR; INTRAVENOUS at 16:24

## 2021-04-16 RX ADMIN — SODIUM CHLORIDE 1000 ML: 900 INJECTION, SOLUTION INTRAVENOUS at 16:26

## 2021-04-16 NOTE — ED PROVIDER NOTES
Subjective   Patient developed abdominal pain early this morning.  Her pain is progressed throughout the day today.      Abdominal Pain  Pain location:  RLQ  Pain quality: aching    Pain radiates to:  Does not radiate  Pain severity:  Moderate  Onset quality:  Sudden  Duration:  12 hours  Timing:  Intermittent  Progression:  Waxing and waning  Chronicity:  New  Relieved by:  Nothing  Worsened by:  Movement  Associated symptoms: nausea    Associated symptoms: no chest pain, no chills, no cough, no diarrhea, no dysuria, no fatigue, no fever, no shortness of breath, no sore throat and no vomiting    Risk factors: not pregnant    Vomiting  The primary symptoms include abdominal pain and nausea. Primary symptoms do not include fever, fatigue, vomiting, diarrhea, dysuria, myalgias or rash.   The illness does not include chills.   Nausea  The primary symptoms include abdominal pain and nausea. Primary symptoms do not include fever, fatigue, vomiting, diarrhea, dysuria, myalgias or rash.   The illness does not include chills.       Review of Systems   Constitutional: Positive for appetite change. Negative for activity change, chills, diaphoresis, fatigue and fever.   HENT: Negative for congestion, ear discharge, ear pain, nosebleeds, rhinorrhea, sinus pressure, sore throat and trouble swallowing.    Eyes: Negative for discharge and redness.   Respiratory: Negative for apnea, cough, chest tightness, shortness of breath and wheezing.    Cardiovascular: Negative for chest pain.   Gastrointestinal: Positive for abdominal pain and nausea. Negative for diarrhea and vomiting.   Endocrine: Negative for polyuria.   Genitourinary: Negative for dysuria, frequency and urgency.   Musculoskeletal: Negative for myalgias and neck pain.   Skin: Negative for color change and rash.   Allergic/Immunologic: Negative for immunocompromised state.   Neurological: Negative for dizziness, seizures, syncope, weakness, light-headedness and headaches.    Hematological: Negative for adenopathy. Does not bruise/bleed easily.   Psychiatric/Behavioral: Negative for behavioral problems and confusion.   All other systems reviewed and are negative.      Past Medical History:   Diagnosis Date   • Abnormal Pap smear of cervix    • Acute maxillary sinusitis    • Acute otitis externa    • Acute pharyngitis    • Acute tonsillitis    • Allergic asthma     IgE-mediated allergic asthma     • Allergic rhinitis    • Allergic rhinitis due to pollen    • Anxiety    • Asthma    • Chondromalacia of patella     left knee    • Closed wedge compression fracture of T7 vertebra with routine healing 8/11/2020   • Common cold    • Cough    • Diarrhea    • Disorder of gallbladder     Probable biliary dyskinesia    • Epigastric pain    • Foot pain    • Gastroenteritis    • Generalized abdominal pain    • Headache    • History of colonoscopy 03/27/2013    Colon endoscopy 19487 (1)  -  Internal & external hemorrhoids found.   • History of esophagogastroduodenoscopy (EGD) 03/27/2013    w/ tube 35021 (1)  -  Normal esophagus. Gastritis in stomach. Biopsy taken. Normal duodenum. Biospy taken   • History of marijuana use    • HPV (human papilloma virus) infection    • IBS (irritable bowel syndrome)    • Influenza    • Irregular periods    • Irritable bowel syndrome    • Migraine    • Nausea    • Nausea and vomiting    • Osgood-Schlatter's disease    • Pain in throat    • Patellar tendonitis    • Right upper quadrant pain    • Streptococcal sore throat    • Temporomandibular joint disorder     WISDOM TEETH    • Upper respiratory infection    • Urgency of urination    • Urinary tract infectious disease    • Varicella    • Viral gastroenteritis    • Vulvovaginitis        Allergies   Allergen Reactions   • Aspirin Shortness Of Breath     TIGHT CHEST   • Erythromycin Base Rash   • Latex Rash     Rash        Past Surgical History:   Procedure Laterality Date   • CHOLECYSTECTOMY  06/06/2013    Laparoscopic   -  laparoscopic cholecystectomy with intraoperative cholangiogram. Cholecystitis.   • INJECTION OF MEDICATION  01/08/2013    Toradol (1)   -  FLORY Sotomayor   • LAPAROSCOPIC CHOLECYSTECTOMY     • WISDOM TOOTH EXTRACTION         Family History   Adopted: Yes   Problem Relation Age of Onset   • Cancer Other    • Diabetes Other    • Heart disease Other    • Hypertension Other    • Stroke Other    • Lung disease Other    • Cholelithiasis Other    • Ulcers Other    • Other Other         Colon problems   • Ovarian cysts Mother    • Bipolar disorder Mother    • Irritable bowel syndrome Mother    • Ovarian cancer Mother    • No Known Problems Sister    • Emphysema Maternal Grandmother    • Heart attack Maternal Grandfather    • No Known Problems Sister    • No Known Problems Sister        Social History     Socioeconomic History   • Marital status:      Spouse name: Not on file   • Number of children: Not on file   • Years of education: Not on file   • Highest education level: Not on file   Tobacco Use   • Smoking status: Never Smoker   • Smokeless tobacco: Never Used   Substance and Sexual Activity   • Alcohol use: No   • Drug use: Yes     Types: Marijuana     Comment: stopped with positive pregnancy test    • Sexual activity: Yes     Partners: Male     Birth control/protection: None     Comment: last pap smear 5/17/19 ASCUS            Objective   Physical Exam  Vitals and nursing note reviewed.   Constitutional:       Appearance: She is well-developed.   HENT:      Head: Normocephalic and atraumatic.      Nose: Nose normal.   Eyes:      General: No scleral icterus.        Right eye: No discharge.         Left eye: No discharge.      Conjunctiva/sclera: Conjunctivae normal.      Pupils: Pupils are equal, round, and reactive to light.   Neck:      Trachea: No tracheal deviation.   Cardiovascular:      Rate and Rhythm: Normal rate and regular rhythm.      Heart sounds: Normal heart sounds. No murmur heard.     Pulmonary:       Effort: Pulmonary effort is normal. No respiratory distress.      Breath sounds: Normal breath sounds. No stridor. No wheezing or rales.   Abdominal:      General: Bowel sounds are normal. There is no distension.      Palpations: Abdomen is soft. There is no mass.      Tenderness: There is abdominal tenderness in the right upper quadrant and right lower quadrant. There is no guarding or rebound.   Musculoskeletal:      Cervical back: Normal range of motion and neck supple.   Skin:     General: Skin is warm and dry.      Findings: No erythema or rash.   Neurological:      Mental Status: She is alert and oriented to person, place, and time.      Coordination: Coordination normal.   Psychiatric:         Behavior: Behavior normal.         Thought Content: Thought content normal.         Procedures           ED Course                   Labs Reviewed   COMPREHENSIVE METABOLIC PANEL - Abnormal; Notable for the following components:       Result Value    Potassium 3.3 (*)     All other components within normal limits    Narrative:     GFR Normal >60  Chronic Kidney Disease <60  Kidney Failure <15     URINALYSIS W/ MICROSCOPIC IF INDICATED (NO CULTURE) - Abnormal; Notable for the following components:    Appearance, UA Cloudy (*)     Leuk Esterase, UA Trace (*)     All other components within normal limits   CBC WITH AUTO DIFFERENTIAL - Abnormal; Notable for the following components:    WBC 15.26 (*)     Neutrophil % 85.3 (*)     Lymphocyte % 9.5 (*)     Monocyte % 3.9 (*)     Neutrophils, Absolute 13.02 (*)     All other components within normal limits   URINALYSIS, MICROSCOPIC ONLY - Abnormal; Notable for the following components:    RBC, UA 0-2 (*)     Squamous Epithelial Cells, UA 6-12 (*)     All other components within normal limits   LIPASE - Normal   HCG, SERUM, QUALITATIVE - Normal   RAINBOW DRAW    Narrative:     The following orders were created for panel order Emma Draw.  Procedure                                Abnormality         Status                     ---------                               -----------         ------                     Light Blue Top[[id number]]                                   Final result               Green Top (Gel)[[id number]]                                  Final result               Lavender Top[[id number]]                                     Final result               Gold Top - SST[[id number]]                                                                Please view results for these tests on the individual orders.   CBC AND DIFFERENTIAL    Narrative:     The following orders were created for panel order CBC & Differential.  Procedure                               Abnormality         Status                     ---------                               -----------         ------                     CBC Auto Differential[[id number]]        Abnormal            Final result                 Please view results for these tests on the individual orders.   LIGHT BLUE TOP   GREEN TOP   LAVENDER TOP       CT Abdomen Pelvis Without Contrast   Final Result   Conclusion:   Normal appendix.   Cholecystectomy.   Moderate amount feces in the colon.   3 mm nonobstructive calculus upper pole right kidney and 5 mm   nonobstructive calculus lower pole right kidney.      [id number]      Electronically signed by:  Roberto Zimmerman MD  4/16/2021 6:12 PM CDT   Workstation: [id number]                                       [facility]    Final diagnoses:   Abdominal pain, unspecified abdominal location   Constipation, unspecified constipation type   Hypokalemia       ED Disposition  ED Disposition     ED Disposition Condition Comment    Discharge Stable           Shelton Corrigan MD  [address] [place] [address] [address] [place] [address] [phone]    In 3 days           Medication List      New Prescriptions    dicyclomine 20 MG tablet  Commonly known as: BENTYL  Take 1 tablet by mouth Every 6 (Six) Hours As Needed (abdominal pain).      metoclopramide 10 MG tablet  Commonly known as: REGLAN  Take 1 tablet by mouth 4 (Four) Times a Day Before Meals & at Bedtime.     polyethylene glycol 17 g packet  Commonly known as: MIRALAX  Take 17 g by mouth Daily As Needed (constipation) for up to 7 days.     potassium chloride 10 MEQ CR tablet  Take 1 tablet by mouth 2 (Two) Times a Day.           Where to Get Your Medications      These medications were sent to [facility]/pharmacy #[id number] - [place], [place] - [address] [place] [address] [phone]  - [phone] [address] [address]    Phone: [phone]   · dicyclomine 20 MG tablet  · metoclopramide 10 MG tablet  · polyethylene glycol 17 g packet  · potassium chloride 10 MEQ CR tablet          Tyshawn Ramirez MD  04/16/21 [id number]

## 2021-04-23 DIAGNOSIS — G47.09 OTHER INSOMNIA: ICD-10-CM

## 2021-04-23 RX ORDER — CLONIDINE HYDROCHLORIDE 0.2 MG/1
TABLET ORAL
Qty: 30 TABLET | Refills: 0 | Status: SHIPPED | OUTPATIENT
Start: 2021-04-23 | End: 2021-05-20 | Stop reason: SDUPTHER

## 2021-05-05 ENCOUNTER — TELEPHONE (OUTPATIENT)
Dept: FAMILY MEDICINE CLINIC | Facility: CLINIC | Age: 26
End: 2021-05-05

## 2021-05-05 NOTE — TELEPHONE ENCOUNTER
PATIENT CALLED AND SHE HAS NOT HEARD ANYTHING BACK REGARDING A LETTER THAT DR DIEGO NELSON WAS SUPPOSE TO TYPE FOR HER TO HAVE A DOG AT HER HOME. SHE SAID THAT SHE CALLED US BACK TO LET US KNOW THAT THE LANDLORD TOLD HER HE JUST NEEDS A LETTER STATING THAT SHE CAN HAVE THE DOG FOR HER ANXIETY. HE SAID THERE WAS NO FORM NEEDED. ALSO SHE SAID THAT SHE IS NEEDING SOMETHING FOR HER BACK. SHE CALLED ABOUT THIS A FEW WEEKS AGO AND NEVER HEARD BACK. SHE SAID THE CREAM THAT HE PRESCRIBED WAS TOO EXPENSIVE ON INSURANCE. HE DID PRESCRIBE FLEXERIL LAST TIME SHE SAID. SHE USES [facility] IN [place] AND HER NUMBER IS  [phone].        THANK YOU,        VIANCA

## 2021-05-20 DIAGNOSIS — G47.09 OTHER INSOMNIA: ICD-10-CM

## 2021-05-20 NOTE — TELEPHONE ENCOUNTER
Patient called requesting a refill for cloNIDine (CATAPRES) 0.2 MG tablet       Please send to:      [facility]/pharmacy #[id number] - [place], [place] - [address] [place] [address] - [phone]  - [phone] [address] [address]   Phone:  [phone]  Fax:  [phone]        Thank you     [phone]

## 2021-05-21 RX ORDER — CLONIDINE HYDROCHLORIDE 0.2 MG/1
0.2 TABLET ORAL
Qty: 30 TABLET | Refills: 0 | Status: SHIPPED | OUTPATIENT
Start: 2021-05-21 | End: 2021-06-16

## 2021-06-16 DIAGNOSIS — G47.09 OTHER INSOMNIA: ICD-10-CM

## 2021-06-16 RX ORDER — CLONIDINE HYDROCHLORIDE 0.2 MG/1
TABLET ORAL
Qty: 30 TABLET | Refills: 0 | Status: SHIPPED | OUTPATIENT
Start: 2021-06-16 | End: 2021-07-13

## 2021-07-13 DIAGNOSIS — G47.09 OTHER INSOMNIA: ICD-10-CM

## 2021-07-13 RX ORDER — CLONIDINE HYDROCHLORIDE 0.2 MG/1
TABLET ORAL
Qty: 30 TABLET | Refills: 0 | Status: SHIPPED | OUTPATIENT
Start: 2021-07-13 | End: 2021-08-12

## 2021-07-13 NOTE — TELEPHONE ENCOUNTER
TRIED TO CALL PATIENT TO MAKE OVERDUE F/U WITH PCP; UNABLE TO REACH PT X1.        THANK YOU,        VIANCA

## 2021-07-14 ENCOUNTER — TELEPHONE (OUTPATIENT)
Dept: FAMILY MEDICINE CLINIC | Facility: CLINIC | Age: 26
End: 2021-07-14

## 2021-07-14 NOTE — TELEPHONE ENCOUNTER
----- Message from Robert Bush PharmD sent at 7/13/2021  2:24 PM CDT -----  Patient needs appointment with PCP

## 2021-07-14 NOTE — TELEPHONE ENCOUNTER
CALLED PATIENT TO SCHEDULE AN APT WITH OUR OFFICE FOR AN OVER DUE FOLLOW UP. UNABLE TO REACH PATIENT OR LEAVE A VM.    SECOND ATTEMPT.    THANKS,  LINNEA

## 2021-07-31 PROCEDURE — 87635 SARS-COV-2 COVID-19 AMP PRB: CPT | Performed by: NURSE PRACTITIONER

## 2021-08-02 DIAGNOSIS — G47.00 INSOMNIA, UNSPECIFIED TYPE: Primary | ICD-10-CM

## 2021-08-02 RX ORDER — TRAZODONE HYDROCHLORIDE 100 MG/1
100 TABLET ORAL NIGHTLY
Qty: 30 TABLET | Refills: 1 | Status: SHIPPED | OUTPATIENT
Start: 2021-08-02 | End: 2021-08-18 | Stop reason: SDUPTHER

## 2021-08-07 DIAGNOSIS — G47.09 OTHER INSOMNIA: ICD-10-CM

## 2021-08-12 RX ORDER — CLONIDINE HYDROCHLORIDE 0.2 MG/1
TABLET ORAL
Qty: 30 TABLET | Refills: 0 | Status: SHIPPED | OUTPATIENT
Start: 2021-08-12 | End: 2021-09-08

## 2021-08-18 DIAGNOSIS — G47.00 INSOMNIA, UNSPECIFIED TYPE: ICD-10-CM

## 2021-08-18 RX ORDER — TRAZODONE HYDROCHLORIDE 100 MG/1
100 TABLET ORAL NIGHTLY
Qty: 30 TABLET | Refills: 1 | Status: SHIPPED | OUTPATIENT
Start: 2021-08-18 | End: 2021-09-21

## 2021-09-07 DIAGNOSIS — G47.09 OTHER INSOMNIA: ICD-10-CM

## 2021-09-08 RX ORDER — CLONIDINE HYDROCHLORIDE 0.2 MG/1
TABLET ORAL
Qty: 30 TABLET | Refills: 1 | Status: SHIPPED | OUTPATIENT
Start: 2021-09-08 | End: 2021-09-21

## 2021-09-21 ENCOUNTER — OFFICE VISIT (OUTPATIENT)
Dept: FAMILY MEDICINE CLINIC | Facility: CLINIC | Age: 26
End: 2021-09-21

## 2021-09-21 VITALS
WEIGHT: 176.4 LBS | OXYGEN SATURATION: 99 % | HEIGHT: 71 IN | SYSTOLIC BLOOD PRESSURE: 120 MMHG | TEMPERATURE: 96.4 F | BODY MASS INDEX: 24.69 KG/M2 | DIASTOLIC BLOOD PRESSURE: 60 MMHG | HEART RATE: 81 BPM

## 2021-09-21 DIAGNOSIS — S22.060D CLOSED WEDGE COMPRESSION FRACTURE OF T7 VERTEBRA WITH ROUTINE HEALING: ICD-10-CM

## 2021-09-21 DIAGNOSIS — R41.840 INATTENTION: ICD-10-CM

## 2021-09-21 DIAGNOSIS — G47.09 OTHER INSOMNIA: ICD-10-CM

## 2021-09-21 DIAGNOSIS — G43.009 MIGRAINE WITHOUT AURA AND WITHOUT STATUS MIGRAINOSUS, NOT INTRACTABLE: Primary | ICD-10-CM

## 2021-09-21 PROCEDURE — 99213 OFFICE O/P EST LOW 20 MIN: CPT | Performed by: STUDENT IN AN ORGANIZED HEALTH CARE EDUCATION/TRAINING PROGRAM

## 2021-09-21 RX ORDER — CLONIDINE HYDROCHLORIDE 0.3 MG/1
0.3 TABLET ORAL 2 TIMES DAILY
Qty: 60 TABLET | Refills: 0 | Status: SHIPPED | OUTPATIENT
Start: 2021-09-21 | End: 2021-10-06

## 2021-09-21 RX ORDER — RIZATRIPTAN BENZOATE 5 MG/1
10 TABLET ORAL ONCE AS NEEDED
Qty: 18 TABLET | Refills: 0 | OUTPATIENT
Start: 2021-09-21 | End: 2022-01-11

## 2021-09-23 PROCEDURE — U0004 COV-19 TEST NON-CDC HGH THRU: HCPCS | Performed by: NURSE PRACTITIONER

## 2021-09-25 NOTE — PROGRESS NOTES
Family Medicine Residency  Shelton Corrigan MD    Subjective:     Millie Quevedo is a 25 y.o. female who presents for migraines, insomnia, and inattentiveness.    Migraine  Patient reports migraines occurring twice a week.  Usually last 24 hours.  Denies any aura.  Endorses photophobia and phonophobia.  Symptoms usually improve with lying in a dark room.  She is under a lot of stress currently as she is going through a divorce and started a new job.    Insomnia  Started on clonidine 0.2 mg at the last visit.  Reports that it is helping and she is better able to sleep through the night.  Feels like she is building tolerance for now and is inquiring about an increased dose.    Inattentiveness  Patient describing difficulty with focus.  Feels like she has had a sense of child but parents did not ever have her evaluated.  Requesting referral for evaluation.    The following portions of the patient's history were reviewed and updated as appropriate: allergies, current medications, past family history, past medical history, past social history, past surgical history and problem list.    Past Medical Hx:  Past Medical History:   Diagnosis Date   • Abnormal Pap smear of cervix    • Acute maxillary sinusitis    • Acute otitis externa    • Acute pharyngitis    • Acute tonsillitis    • Allergic asthma     IgE-mediated allergic asthma     • Allergic rhinitis    • Allergic rhinitis due to pollen    • Anxiety    • Asthma    • Chondromalacia of patella     left knee    • Closed wedge compression fracture of T7 vertebra with routine healing 8/11/2020   • Common cold    • Cough    • Diarrhea    • Disorder of gallbladder     Probable biliary dyskinesia    • Epigastric pain    • Foot pain    • Gastroenteritis    • Generalized abdominal pain    • Headache    • History of colonoscopy 03/27/2013    Colon endoscopy 72471 (1)  -  Internal & external hemorrhoids found.   • History of esophagogastroduodenoscopy (EGD) 03/27/2013     w/ tube 09481 (1)  -  Normal esophagus. Gastritis in stomach. Biopsy taken. Normal duodenum. Biospy taken   • History of marijuana use    • HPV (human papilloma virus) infection    • IBS (irritable bowel syndrome)    • Influenza    • Irregular periods    • Irritable bowel syndrome    • Migraine    • Nausea    • Nausea and vomiting    • Osgood-Schlatter's disease    • Pain in throat    • Patellar tendonitis    • Right upper quadrant pain    • Streptococcal sore throat    • Temporomandibular joint disorder     WISDOM TEETH    • Upper respiratory infection    • Urgency of urination    • Urinary tract infectious disease    • Varicella    • Viral gastroenteritis    • Vulvovaginitis        Past Surgical Hx:  Past Surgical History:   Procedure Laterality Date   • CHOLECYSTECTOMY  06/06/2013    Laparoscopic  -  laparoscopic cholecystectomy with intraoperative cholangiogram. Cholecystitis.   • INJECTION OF MEDICATION  01/08/2013    Toradol (1)   -  FLORY Sotomayor   • LAPAROSCOPIC CHOLECYSTECTOMY     • WISDOM TOOTH EXTRACTION         Current Meds:    Current Outpatient Medications:   •  cloNIDine (Catapres) 0.3 MG tablet, Take 1 tablet by mouth 2 (Two) Times a Day for 30 days., Disp: 60 tablet, Rfl: 0  •  Diclofenac Sodium (VOLTAREN) 1 % gel gel, Apply 4 g topically to the appropriate area as directed 4 (Four) Times a Day As Needed (pain)., Disp: 150 g, Rfl: 0  •  lidocaine (XYLOCAINE) 2 % jelly, Apply  topically to the appropriate area as directed As Needed for Mild Pain ., Disp: 85 g, Rfl: 1  •  rizatriptan (Maxalt) 5 MG tablet, Take 2 tablets by mouth 1 (One) Time As Needed for Migraine. May repeat in 2 hours if needed, Disp: 18 tablet, Rfl: 0    Allergies:  Allergies   Allergen Reactions   • Aspirin Shortness Of Breath     TIGHT CHEST   • Erythromycin Base Rash   • Latex Rash     Rash        Family Hx:  Family History   Adopted: Yes   Problem Relation Age of Onset   • Cancer Other    • Diabetes Other    • Heart disease Other     • Hypertension Other    • Stroke Other    • Lung disease Other    • Cholelithiasis Other    • Ulcers Other    • Other Other         Colon problems   • Ovarian cysts Mother    • Bipolar disorder Mother    • Irritable bowel syndrome Mother    • Ovarian cancer Mother    • No Known Problems Sister    • Emphysema Maternal Grandmother    • Heart attack Maternal Grandfather    • No Known Problems Sister    • No Known Problems Sister         Social History:  Social History     Socioeconomic History   • Marital status:      Spouse name: Not on file   • Number of children: Not on file   • Years of education: Not on file   • Highest education level: Not on file   Tobacco Use   • Smoking status: Never Smoker   • Smokeless tobacco: Never Used   Substance and Sexual Activity   • Alcohol use: No   • Drug use: Yes     Types: Marijuana     Comment: stopped with positive pregnancy test    • Sexual activity: Yes     Partners: Male     Birth control/protection: None     Comment: last pap smear 5/17/19 ASCUS        Review of Systems  Review of Systems   Constitutional: Negative for activity change, appetite change, fatigue and fever.   HENT: Negative for congestion, rhinorrhea, sinus pain and sore throat.    Eyes: Positive for photophobia. Negative for pain, redness and visual disturbance.   Respiratory: Negative for cough, shortness of breath and wheezing.    Cardiovascular: Negative for chest pain, palpitations and leg swelling.   Gastrointestinal: Negative for abdominal pain, constipation, diarrhea, nausea and vomiting.   Genitourinary: Negative for dysuria and flank pain.   Musculoskeletal: Negative for arthralgias, back pain, joint swelling and myalgias.   Skin: Negative for color change, pallor and rash.   Neurological: Positive for headaches. Negative for dizziness and light-headedness.   Psychiatric/Behavioral: Positive for decreased concentration.       Objective:     /60   Pulse 81   Temp 96.4 °F (35.8 °C)    "Ht 180.3 cm (71\")   Wt 80 kg (176 lb 6.4 oz)   SpO2 99%   BMI 24.60 kg/m²   Physical Exam  Vitals and nursing note reviewed.   Constitutional:       General: She is not in acute distress.     Appearance: Normal appearance. She is well-developed. She is not diaphoretic.   HENT:      Head: Normocephalic and atraumatic.      Right Ear: Hearing normal.      Left Ear: Hearing normal.   Eyes:      General: Lids are normal.      Conjunctiva/sclera: Conjunctivae normal.      Pupils: Pupils are equal, round, and reactive to light.   Cardiovascular:      Rate and Rhythm: Normal rate and regular rhythm.      Heart sounds: Normal heart sounds.   Pulmonary:      Effort: Pulmonary effort is normal. No respiratory distress.      Breath sounds: Normal breath sounds. No wheezing or rales.   Abdominal:      General: Bowel sounds are normal. There is no distension.      Palpations: Abdomen is soft.      Tenderness: There is no abdominal tenderness.   Musculoskeletal:         General: No tenderness or deformity. Normal range of motion.      Right lower leg: No edema.      Left lower leg: No edema.   Skin:     General: Skin is warm and dry.      Coloration: Skin is not pale.      Findings: No erythema or rash.   Neurological:      Mental Status: She is alert and oriented to person, place, and time. She is not disoriented.          Assessment/Plan:     Diagnoses and all orders for this visit:    1. Migraine without aura and without status migrainosus, not intractable (Primary)  -     rizatriptan (Maxalt) 5 MG tablet; Take 2 tablets by mouth 1 (One) Time As Needed for Migraine. May repeat in 2 hours if needed  Dispense: 18 tablet; Refill: 0    2. Inattention  -     Ambulatory Referral to Psychology    3. Other insomnia  -     cloNIDine (Catapres) 0.3 MG tablet; Take 1 tablet by mouth 2 (Two) Times a Day for 30 days.  Dispense: 60 tablet; Refill: 0    4. Closed wedge compression fracture of T7 vertebra with routine healing  -     " Diclofenac Sodium (VOLTAREN) 1 % gel gel; Apply 4 g topically to the appropriate area as directed 4 (Four) Times a Day As Needed (pain).  Dispense: 150 g; Refill: 0  -     lidocaine (XYLOCAINE) 2 % jelly; Apply  topically to the appropriate area as directed As Needed for Mild Pain .  Dispense: 85 g; Refill: 1    1. New, uncontrolled. Will trial Maxalt for 4-6 weeks. Determine need for prophylactic medication after assessing response to abortive medication.    2. Refer to Dr. Shaw for ADD evaluation. Will determine need for medication once evaluated.    3. Chronic, improving. Increase dose of clonidine.    4. Chronic, improving. Trial of voltaren and lidocaine gel for pain.    · Rx changes: add maxalt, volataren gel, lidocaine gel; increase clonidine  · Patient Education: see a/p  · Compliance at present is estimated to be good.     Depression screening: Up to date; last screen 4/1/2021     Follow-up:     Return in about 6 weeks (around 11/2/2021) for Recheck.    Preventative:  Health Maintenance   Topic Date Due   • ANNUAL PHYSICAL  Never done   • HPV VACCINES (1 - 2-dose series) Never done   • INFLUENZA VACCINE  10/01/2021   • PAP SMEAR  05/17/2022   • TDAP/TD VACCINES (2 - Td or Tdap) 11/14/2029   • HEPATITIS C SCREENING  Completed   • COVID-19 Vaccine  Completed   • Pneumococcal Vaccine 0-64  Aged Out     Female Preventative: UTD  Recommended: HPV  Vaccine Counseling: N/A    Weight  -Class: Normal: 18.5-24.9kg/m2  -Patient's Body mass index is 24.6 kg/m². indicating that she is within normal range (BMI 18.5-24.9). No BMI management plan needed..   eat more fruits and vegetables, decrease soda or juice intake, increase water intake, increase physical activity, reduce screen time and reduce portion size    Alcohol use:  reports no history of alcohol use.  Nicotine status  reports that she has never smoked. She has never used smokeless tobacco.    Goals     •  Less migraine (pt-stated)       Multiple stressors  (divorce, new job)      •  Less pain       T7 compression fracture            RISK SCORE: 3      Shelton Corrigan M.D. PGY3  [facility]  [address] [place] [address]  Office: [phone]  This document has been electronically signed by Shelton Corrigan MD on September 25, 2021 14:31 CDT

## 2021-09-27 NOTE — PROGRESS NOTES
I have reviewed the patient.  I have reviewed the notes, assessments, and/or procedures performed by Dr Shelton Corrigan, I concur with his  documentation and assessment and plan for Millie Quevedo.          This document has been electronically signed by Frank Abreu MD on September 27, 2021 11:04 CDT

## 2021-09-30 ENCOUNTER — TELEPHONE (OUTPATIENT)
Dept: FAMILY MEDICINE CLINIC | Facility: CLINIC | Age: 26
End: 2021-09-30

## 2021-09-30 NOTE — TELEPHONE ENCOUNTER
PATIENT CALLED AND STATED THAT THE Diclofenac Sodium (VOLTAREN) 1 % gel gel IS NOT WORKING FOR THE BACK PAIN SHE IS HAVING. SHE SAID THAT THE PHARMACY SAID IT IS NOT INTENDED FOR IMMEDIATE OR BACK USE.    SHE WOULD LIKE SOMETHING ELSE TO BE SENT IN FOR HER.    CALL BACK NUMBER FOR HER [phone].    THANKS,  LINNEA

## 2021-10-02 DIAGNOSIS — G47.09 OTHER INSOMNIA: ICD-10-CM

## 2021-10-03 DIAGNOSIS — S22.060D CLOSED WEDGE COMPRESSION FRACTURE OF T7 VERTEBRA WITH ROUTINE HEALING: Primary | ICD-10-CM

## 2021-10-03 RX ORDER — DICLOFENAC SODIUM 20 MG/G
1 SOLUTION TOPICAL 2 TIMES DAILY PRN
Qty: 112 G | Refills: 1 | OUTPATIENT
Start: 2021-10-03 | End: 2022-01-11

## 2021-10-04 ENCOUNTER — TELEPHONE (OUTPATIENT)
Dept: FAMILY MEDICINE CLINIC | Facility: CLINIC | Age: 26
End: 2021-10-04

## 2021-10-04 NOTE — TELEPHONE ENCOUNTER
Called pt there was no answer left vm to call back to let her know Dr. Corrigan put in a new prescription.

## 2021-10-06 ENCOUNTER — OFFICE VISIT (OUTPATIENT)
Dept: OBSTETRICS AND GYNECOLOGY | Facility: CLINIC | Age: 26
End: 2021-10-06

## 2021-10-06 VITALS
HEIGHT: 71 IN | WEIGHT: 179 LBS | SYSTOLIC BLOOD PRESSURE: 110 MMHG | DIASTOLIC BLOOD PRESSURE: 74 MMHG | BODY MASS INDEX: 25.06 KG/M2

## 2021-10-06 DIAGNOSIS — N89.8 VAGINAL DRYNESS: ICD-10-CM

## 2021-10-06 DIAGNOSIS — Z12.4 ENCOUNTER FOR PAPANICOLAOU SMEAR FOR CERVICAL CANCER SCREENING: ICD-10-CM

## 2021-10-06 DIAGNOSIS — Z01.419 WELL WOMAN EXAM WITH ROUTINE GYNECOLOGICAL EXAM: Primary | ICD-10-CM

## 2021-10-06 DIAGNOSIS — N94.10 FEMALE DYSPAREUNIA: ICD-10-CM

## 2021-10-06 PROCEDURE — 99395 PREV VISIT EST AGE 18-39: CPT | Performed by: NURSE PRACTITIONER

## 2021-10-06 RX ORDER — CONJUGATED ESTROGENS 0.62 MG/G
CREAM VAGINAL
Qty: 30 G | Refills: 6 | OUTPATIENT
Start: 2021-10-06 | End: 2022-01-11

## 2021-10-06 RX ORDER — CLONIDINE HYDROCHLORIDE 0.3 MG/1
0.3 TABLET ORAL 2 TIMES DAILY
Qty: 60 TABLET | Refills: 3 | Status: SHIPPED | OUTPATIENT
Start: 2021-10-06 | End: 2021-12-28 | Stop reason: SDUPTHER

## 2021-10-06 NOTE — PROGRESS NOTES
Subjective   Millie Quevedo is a 25 y.o. here for gyn annual    Millie Quevedo is a 25 yr old  premenopausal female who presents today for her gyn annual. Also, concern for pain with sex in the last month. Has vaginal dryness and lubrication has not helped and pt is also sensitive for soaps and latex. Millie also voices trauma from being told she had genital warts and HPV 5 years ago. Does not have genital warts at this time. Pt is . Declines need for STI screening.       The following portions of the patient's history were reviewed and updated as appropriate: allergies, current medications, past family history, past medical history, past social history, past surgical history and problem list.    Review of Systems   Constitutional: Negative for chills, fatigue, fever, unexpected weight gain and unexpected weight loss.   HENT: Negative for sneezing and sore throat.    Respiratory: Negative for shortness of breath.    Cardiovascular: Negative for chest pain and palpitations.   Gastrointestinal: Negative for abdominal pain, constipation, diarrhea and nausea.   Endocrine: Negative for cold intolerance and heat intolerance.   Genitourinary: Positive for dyspareunia. Negative for amenorrhea, breast discharge, breast lump, breast pain, difficulty urinating, dysuria, frequency, menstrual problem, pelvic pain, pelvic pressure, urinary incontinence, vaginal bleeding, vaginal discharge and vaginal pain.   Skin: Negative for rash.   Neurological: Negative for weakness and headache.   Psychiatric/Behavioral: Negative for sleep disturbance, depressed mood and stress.       Objective   Physical Exam  Vitals and nursing note reviewed. Exam conducted with a chaperone present.   Constitutional:       Appearance: She is well-developed.   HENT:      Head: Normocephalic and atraumatic.   Eyes:      Conjunctiva/sclera: Conjunctivae normal.   Neck:      Thyroid: No thyromegaly.   Cardiovascular:      Rate and Rhythm:  Normal rate and regular rhythm.      Heart sounds: Normal heart sounds.   Pulmonary:      Effort: Pulmonary effort is normal. No respiratory distress.      Breath sounds: Normal breath sounds.   Abdominal:      General: Bowel sounds are normal. There is no distension.      Palpations: Abdomen is soft.      Tenderness: There is no abdominal tenderness.   Genitourinary:     General: Normal vulva.      Labia:         Right: No rash, tenderness, lesion or injury.         Left: No rash, tenderness, lesion or injury.       Vagina: Normal.      Cervix: Normal.      Uterus: Normal.       Adnexa: Right adnexa normal and left adnexa normal.      Rectum: Normal.      Comments: Pap collected.   Musculoskeletal:         General: Normal range of motion.      Cervical back: Normal range of motion and neck supple.   Skin:     General: Skin is warm and dry.   Neurological:      Mental Status: She is alert and oriented to person, place, and time.   Psychiatric:         Behavior: Behavior normal.           Assessment/Plan   Diagnoses and all orders for this visit:    1. Well woman exam with routine gynecological exam (Primary)    2. Encounter for Papanicolaou smear for cervical cancer screening  -     Liquid-based Pap Smear, Screening    3. Female dyspareunia  -     conjugated estrogens (Premarin) 0.625 MG/GM vaginal cream; Insert 0.5 grams vaginally at bedtime 2 nights per week.  Dispense: 30 g; Refill: 6    4. Vaginal dryness  -     conjugated estrogens (Premarin) 0.625 MG/GM vaginal cream; Insert 0.5 grams vaginally at bedtime 2 nights per week.  Dispense: 30 g; Refill: 6          Reviewed pap smear screening guidelines. Discussed HPV extensively. Will try Premarin; pt declines referral to physical therapy for dyspareunia at this time. Reviewed breast awareness Return in one year or as needed.

## 2021-10-08 LAB
LAB AP CASE REPORT: NORMAL
PATH INTERP SPEC-IMP: NORMAL

## 2021-10-27 PROCEDURE — 87635 SARS-COV-2 COVID-19 AMP PRB: CPT | Performed by: NURSE PRACTITIONER

## 2021-11-01 ENCOUNTER — OFFICE VISIT (OUTPATIENT)
Dept: FAMILY MEDICINE CLINIC | Facility: CLINIC | Age: 26
End: 2021-11-01

## 2021-11-01 VITALS
SYSTOLIC BLOOD PRESSURE: 104 MMHG | WEIGHT: 175.9 LBS | BODY MASS INDEX: 24.63 KG/M2 | HEART RATE: 66 BPM | HEIGHT: 71 IN | DIASTOLIC BLOOD PRESSURE: 62 MMHG | OXYGEN SATURATION: 99 % | TEMPERATURE: 97.5 F

## 2021-11-01 DIAGNOSIS — G62.9 NEUROPATHY: ICD-10-CM

## 2021-11-01 DIAGNOSIS — G43.709 CHRONIC MIGRAINE WITHOUT AURA WITHOUT STATUS MIGRAINOSUS, NOT INTRACTABLE: ICD-10-CM

## 2021-11-01 DIAGNOSIS — S22.060D CLOSED WEDGE COMPRESSION FRACTURE OF T7 VERTEBRA WITH ROUTINE HEALING: Primary | ICD-10-CM

## 2021-11-01 PROCEDURE — 99213 OFFICE O/P EST LOW 20 MIN: CPT | Performed by: STUDENT IN AN ORGANIZED HEALTH CARE EDUCATION/TRAINING PROGRAM

## 2021-11-01 RX ORDER — DULOXETIN HYDROCHLORIDE 60 MG/1
60 CAPSULE, DELAYED RELEASE ORAL DAILY
Qty: 30 CAPSULE | Refills: 0 | Status: SHIPPED | OUTPATIENT
Start: 2021-11-16 | End: 2021-11-29 | Stop reason: SDUPTHER

## 2021-11-01 RX ORDER — DULOXETIN HYDROCHLORIDE 30 MG/1
30 CAPSULE, DELAYED RELEASE ORAL DAILY
Qty: 14 CAPSULE | Refills: 0 | Status: SHIPPED | OUTPATIENT
Start: 2021-11-01 | End: 2021-11-30

## 2021-11-01 RX ORDER — TIZANIDINE HYDROCHLORIDE 4 MG/1
4 CAPSULE, GELATIN COATED ORAL 3 TIMES DAILY PRN
Qty: 60 CAPSULE | Refills: 0 | OUTPATIENT
Start: 2021-11-01 | End: 2021-12-27

## 2021-11-09 NOTE — PROGRESS NOTES
Family Medicine Residency  Shelton Corrigan MD    Subjective:     Millie Quevedo is a 26 y.o. female who presents for follow-up of back pain and migraines.    T7 Compression Fracture  Date of injury: 7/4/2020. MRI showed mild compression fracture of T7 superior endplate. 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 Trial of lidocaine gel and voltaren 2% was ineffective. Describes a burning sensation which is worse after work and at nighttime.    Migraine  Symptoms improving. Given Maxalt at the last visit and has utilized 2-3 times in the past 6 weeks. They are effective at treating her migraines. Was previously getting episodes twice weekly.    The following portions of the patient's history were reviewed and updated as appropriate: allergies, current medications, past family history, past medical history, past social history, past surgical history and problem list.    Past Medical Hx:  Past Medical History:   Diagnosis Date   • Abnormal Pap smear of cervix    • Acute maxillary sinusitis    • Acute otitis externa    • Acute pharyngitis    • Acute tonsillitis    • Allergic asthma     IgE-mediated allergic asthma     • Allergic rhinitis    • Allergic rhinitis due to pollen    • Anxiety    • Asthma    • Chondromalacia of patella     left knee    • Closed wedge compression fracture of T7 vertebra with routine healing 8/11/2020   • Common cold    • Cough    • Diarrhea    • Disorder of gallbladder     Probable biliary dyskinesia    • Epigastric pain    • Foot pain    • Gastroenteritis    • Generalized abdominal pain    • Headache    • History of colonoscopy 03/27/2013    Colon endoscopy 07754 (1)  -  Internal & external hemorrhoids found.   • History of esophagogastroduodenoscopy (EGD) 03/27/2013    w/ tube 17950 (1)  -  Normal esophagus. Gastritis in stomach. Biopsy taken. Normal duodenum. Biospy taken   • History of marijuana use    • HPV (human papilloma virus) infection    • IBS  (irritable bowel syndrome)    • Influenza    • Irregular periods    • Irritable bowel syndrome    • Migraine    • Nausea    • Nausea and vomiting    • Osgood-Schlatter's disease    • Pain in throat    • Patellar tendonitis    • Right upper quadrant pain    • Streptococcal sore throat    • Temporomandibular joint disorder     WISDOM TEETH    • Upper respiratory infection    • Urgency of urination    • Urinary tract infectious disease    • Varicella    • Viral gastroenteritis    • Vulvovaginitis        Past Surgical Hx:  Past Surgical History:   Procedure Laterality Date   • CHOLECYSTECTOMY  06/06/2013    Laparoscopic  -  laparoscopic cholecystectomy with intraoperative cholangiogram. Cholecystitis.   • INJECTION OF MEDICATION  01/08/2013    Toradol (1)   -  FLORY Sotomayor   • LAPAROSCOPIC CHOLECYSTECTOMY     • WISDOM TOOTH EXTRACTION         Current Meds:    Current Outpatient Medications:   •  albuterol sulfate HFA (Ventolin HFA) 108 (90 Base) MCG/ACT inhaler, Inhale 2 puffs Every 4 (Four) Hours As Needed for Wheezing., Disp: 6.7 g, Rfl: 0  •  cloNIDine (Catapres) 0.3 MG tablet, Take 1 tablet by mouth 2 (Two) Times a Day., Disp: 60 tablet, Rfl: 3  •  Diclofenac Sodium 2 % solution, Apply 1 each topically 2 (Two) Times a Day As Needed (back)., Disp: 112 g, Rfl: 1  •  lidocaine (XYLOCAINE) 2 % jelly, Apply  topically to the appropriate area as directed As Needed for Mild Pain ., Disp: 85 g, Rfl: 1  •  brompheniramine-pseudoephedrine-DM (Bromfed DM) 30-2-10 MG/5ML syrup, Take 5 mL by mouth Every 4 (Four) Hours As Needed for Congestion, Cough or Allergies., Disp: 120 mL, Rfl: 0  •  conjugated estrogens (Premarin) 0.625 MG/GM vaginal cream, Insert 0.5 grams vaginally at bedtime 2 nights per week., Disp: 30 g, Rfl: 6  •  DULoxetine (Cymbalta) 30 MG capsule, Take 1 capsule by mouth Daily for 14 days., Disp: 14 capsule, Rfl: 0  •  [START ON 11/16/2021] DULoxetine (Cymbalta) 60 MG capsule, Take 1 capsule by mouth Daily., Disp:  30 capsule, Rfl: 0  •  rizatriptan (Maxalt) 5 MG tablet, Take 2 tablets by mouth 1 (One) Time As Needed for Migraine. May repeat in 2 hours if needed, Disp: 18 tablet, Rfl: 0  •  TiZANidine (Zanaflex) 4 MG capsule, Take 1 capsule by mouth 3 (Three) Times a Day As Needed for Muscle Spasms., Disp: 60 capsule, Rfl: 0    Allergies:  Allergies   Allergen Reactions   • Aspirin Shortness Of Breath     TIGHT CHEST   • Erythromycin Base Rash   • Latex Rash     Rash        Family Hx:  Family History   Adopted: Yes   Problem Relation Age of Onset   • Cancer Other    • Diabetes Other    • Heart disease Other    • Hypertension Other    • Stroke Other    • Lung disease Other    • Cholelithiasis Other    • Ulcers Other    • Other Other         Colon problems   • Ovarian cysts Mother    • Bipolar disorder Mother    • Irritable bowel syndrome Mother    • Ovarian cancer Mother    • No Known Problems Sister    • Emphysema Maternal Grandmother    • Heart attack Maternal Grandfather    • No Known Problems Sister    • No Known Problems Sister         Social History:  Social History     Socioeconomic History   • Marital status:    Tobacco Use   • Smoking status: Never Smoker   • Smokeless tobacco: Never Used   Substance and Sexual Activity   • Alcohol use: No   • Drug use: Yes     Types: Marijuana     Comment: stopped with positive pregnancy test    • Sexual activity: Yes     Partners: Male     Birth control/protection: None     Comment: last pap smear 5/17/19 ASCUS        Review of Systems  Review of Systems   Constitutional: Negative for activity change, appetite change, fatigue and fever.   HENT: Negative for congestion, rhinorrhea, sinus pain and sore throat.    Eyes: Negative for pain, redness and visual disturbance.   Respiratory: Negative for cough, shortness of breath and wheezing.    Cardiovascular: Negative for chest pain, palpitations and leg swelling.   Gastrointestinal: Negative for abdominal pain, constipation,  "diarrhea, nausea and vomiting.   Genitourinary: Negative for dysuria and flank pain.   Musculoskeletal: Positive for back pain. Negative for arthralgias, joint swelling and myalgias.   Skin: Negative for color change, pallor and rash.   Neurological: Positive for numbness. Negative for dizziness, light-headedness and headaches.       Objective:     /62   Pulse 66   Temp 97.5 °F (36.4 °C)   Ht 180.3 cm (71\")   Wt 79.8 kg (175 lb 14.4 oz)   LMP 10/06/2021   SpO2 99%   BMI 24.53 kg/m²   Physical Exam  Vitals and nursing note reviewed.   Constitutional:       General: She is not in acute distress.     Appearance: Normal appearance. She is well-developed. She is not diaphoretic.   HENT:      Head: Normocephalic and atraumatic.      Right Ear: Hearing normal.      Left Ear: Hearing normal.   Eyes:      General: Lids are normal.      Conjunctiva/sclera: Conjunctivae normal.      Pupils: Pupils are equal, round, and reactive to light.   Cardiovascular:      Rate and Rhythm: Normal rate and regular rhythm.      Heart sounds: Normal heart sounds.   Pulmonary:      Effort: Pulmonary effort is normal. No respiratory distress.      Breath sounds: Normal breath sounds. No wheezing or rales.   Abdominal:      General: Bowel sounds are normal. There is no distension.      Palpations: Abdomen is soft.      Tenderness: There is no abdominal tenderness.   Musculoskeletal:         General: No deformity. Normal range of motion.      Thoracic back: Tenderness present.      Right lower leg: No edema.      Left lower leg: No edema.   Skin:     General: Skin is warm and dry.      Coloration: Skin is not pale.      Findings: No erythema or rash.   Neurological:      Mental Status: She is alert and oriented to person, place, and time. She is not disoriented.          Assessment/Plan:     Diagnoses and all orders for this visit:    1. Closed wedge compression fracture of T7 vertebra with routine healing (Primary)  -     DULoxetine " (Cymbalta) 30 MG capsule; Take 1 capsule by mouth Daily for 14 days.  Dispense: 14 capsule; Refill: 0  -     DULoxetine (Cymbalta) 60 MG capsule; Take 1 capsule by mouth Daily.  Dispense: 30 capsule; Refill: 0  -     TiZANidine (Zanaflex) 4 MG capsule; Take 1 capsule by mouth 3 (Three) Times a Day As Needed for Muscle Spasms.  Dispense: 60 capsule; Refill: 0    2. Neuropathy  -     DULoxetine (Cymbalta) 30 MG capsule; Take 1 capsule by mouth Daily for 14 days.  Dispense: 14 capsule; Refill: 0  -     DULoxetine (Cymbalta) 60 MG capsule; Take 1 capsule by mouth Daily.  Dispense: 30 capsule; Refill: 0    3. Chronic migraine without aura without status migrainosus, not intractable      1/2. Chronic, worsening. Will trial Cymbalta and Zanaflex. Cymbalta 30mg x 2 weeks followed by 60mg daily. Consider increased dose at next visit if somewhat effective or Sevela if no improvement.    3. Chronic, well controlled. Continue Maxalt. Consider prophylactic medication if symptoms worsen or become more frequent.    · Rx changes: add cymbalta, zanaflex  · Patient Education: see a/p  · Compliance at present is estimated to be good.       Depression screening: Up to date; last screen 4/1/2021     Follow-up:     Return in about 4 weeks (around 11/29/2021) for Recheck.    Preventative:  Health Maintenance   Topic Date Due   • ANNUAL PHYSICAL  Never done   • INFLUENZA VACCINE  Never done   • PAP SMEAR  10/06/2024   • TDAP/TD VACCINES (2 - Td or Tdap) 11/14/2029   • HEPATITIS C SCREENING  Completed   • COVID-19 Vaccine  Completed   • Pneumococcal Vaccine 0-64  Aged Out     Female Preventative: Last PAP was 10/6/21  Recommended: Influenza  Vaccine Counseling: N/A    Weight  -Class: Normal: 18.5-24.9kg/m2  -Patient's Body mass index is 24.53 kg/m². indicating that she is within normal range (BMI 18.5-24.9). No BMI management plan needed..   eat more fruits and vegetables, decrease soda or juice intake, increase water intake, increase  physical activity, reduce screen time, reduce portion size and cut out extra servings    Alcohol use:  reports no history of alcohol use.  Nicotine status  reports that she has never smoked. She has never used smokeless tobacco.    Goals     •  Less migraine (pt-stated)       Multiple stressors (divorce, new job)      •  Less pain       T7 compression fracture            RISK SCORE: 3      Shelton Corrigan M.D. PGY3  [facility] Medicine Residency  [address] [place] [address]  Office: [phone]  This document has been electronically signed by Shelton Corrigan MD on November 9, 2021 17:11 CST

## 2021-11-17 NOTE — PROGRESS NOTES
I have reviewed the notes, assessments, and/or procedures performed by Shelton Corrigan MD  , I concur with her/his documentation of Millie Quevedo.

## 2021-11-18 ENCOUNTER — TELEPHONE (OUTPATIENT)
Dept: FAMILY MEDICINE CLINIC | Facility: CLINIC | Age: 26
End: 2021-11-18

## 2021-11-18 NOTE — TELEPHONE ENCOUNTER
SPOKE WITH PT ABOUT SCHEDULING APT. PT IS SAME DAY ONLY STATUS. OFFERED WALK IN FOR Friday. PT DECLINED STATING THAT SHE WOULD BE OUT OF TOWN. INFORMED PT THAT LESLIE IS IN OFFICE ON Monday AND TO CALL AT 8AM IN THE MORNING TO GET A SAME DAY SPOT. PT AGREED.

## 2021-11-29 DIAGNOSIS — G62.9 NEUROPATHY: ICD-10-CM

## 2021-11-29 DIAGNOSIS — S22.060D CLOSED WEDGE COMPRESSION FRACTURE OF T7 VERTEBRA WITH ROUTINE HEALING: ICD-10-CM

## 2021-11-30 RX ORDER — DULOXETIN HYDROCHLORIDE 60 MG/1
60 CAPSULE, DELAYED RELEASE ORAL DAILY
Qty: 30 CAPSULE | Refills: 3 | OUTPATIENT
Start: 2021-11-30 | End: 2021-12-27

## 2021-12-14 ENCOUNTER — OFFICE VISIT (OUTPATIENT)
Dept: BEHAVIORAL HEALTH | Facility: CLINIC | Age: 26
End: 2021-12-14

## 2021-12-14 DIAGNOSIS — F41.1 GENERALIZED ANXIETY DISORDER: Primary | ICD-10-CM

## 2021-12-14 PROCEDURE — 90791 PSYCH DIAGNOSTIC EVALUATION: CPT | Performed by: PSYCHOLOGIST
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
## 2021-12-27 PROCEDURE — 87635 SARS-COV-2 COVID-19 AMP PRB: CPT | Performed by: NURSE PRACTITIONER

## 2021-12-28 ENCOUNTER — OFFICE VISIT (OUTPATIENT)
Dept: FAMILY MEDICINE CLINIC | Facility: CLINIC | Age: 26
End: 2021-12-28

## 2021-12-28 VITALS
TEMPERATURE: 97.9 F | HEIGHT: 71 IN | HEART RATE: 78 BPM | BODY MASS INDEX: 24.71 KG/M2 | DIASTOLIC BLOOD PRESSURE: 62 MMHG | WEIGHT: 176.5 LBS | OXYGEN SATURATION: 99 % | SYSTOLIC BLOOD PRESSURE: 106 MMHG

## 2021-12-28 DIAGNOSIS — G47.33 OSA (OBSTRUCTIVE SLEEP APNEA): ICD-10-CM

## 2021-12-28 DIAGNOSIS — G47.09 OTHER INSOMNIA: ICD-10-CM

## 2021-12-28 DIAGNOSIS — L03.012 PARONYCHIA OF FINGER OF LEFT HAND: Primary | ICD-10-CM

## 2021-12-28 PROCEDURE — 99213 OFFICE O/P EST LOW 20 MIN: CPT | Performed by: STUDENT IN AN ORGANIZED HEALTH CARE EDUCATION/TRAINING PROGRAM

## 2021-12-28 RX ORDER — CLONIDINE HYDROCHLORIDE 0.3 MG/1
0.3 TABLET ORAL 2 TIMES DAILY
Qty: 60 TABLET | Refills: 3 | Status: SHIPPED | OUTPATIENT
Start: 2021-12-28 | End: 2022-03-14

## 2021-12-28 RX ORDER — DOXYCYCLINE HYCLATE 100 MG/1
100 CAPSULE ORAL 2 TIMES DAILY
Qty: 14 CAPSULE | Refills: 0 | OUTPATIENT
Start: 2021-12-28 | End: 2022-01-11

## 2021-12-31 ENCOUNTER — HOSPITAL ENCOUNTER (EMERGENCY)
Facility: HOSPITAL | Age: 26
Discharge: HOME | End: 2021-12-31
Attending: EMERGENCY MEDICINE | Admitting: EMERGENCY MEDICINE

## 2021-12-31 VITALS
RESPIRATION RATE: 18 BRPM | HEIGHT: 71 IN | WEIGHT: 174 LBS | DIASTOLIC BLOOD PRESSURE: 89 MMHG | SYSTOLIC BLOOD PRESSURE: 132 MMHG | TEMPERATURE: 98.8 F | OXYGEN SATURATION: 99 % | BODY MASS INDEX: 24.36 KG/M2 | HEART RATE: 76 BPM

## 2021-12-31 DIAGNOSIS — L03.011 PARONYCHIA OF RIGHT INDEX FINGER: Primary | ICD-10-CM

## 2021-12-31 PROCEDURE — 99283 EMERGENCY DEPT VISIT LOW MDM: CPT

## 2021-12-31 PROCEDURE — 99282 EMERGENCY DEPT VISIT SF MDM: CPT

## 2022-01-07 NOTE — PROGRESS NOTES
Family Medicine Residency  Shelton Corrigan MD    Subjective:     Millie Quevedo is a 26 y.o. female who presents for finger infection and referral for sleep study. Reports infection of distal L middle finger. Woke up with swelling and pain 1 day ago. Does not recall any trauma of insect bites. Endorses pain through the finger, numbness distally, and difficulty with flexion. Denies fever, chills.    The following portions of the patient's history were reviewed and updated as appropriate: allergies, current medications, past family history, past medical history, past social history, past surgical history and problem list.    Past Medical Hx:  Past Medical History:   Diagnosis Date   • Abnormal Pap smear of cervix    • Acute maxillary sinusitis    • Acute otitis externa    • Acute pharyngitis    • Acute tonsillitis    • Allergic asthma     IgE-mediated allergic asthma     • Allergic rhinitis    • Allergic rhinitis due to pollen    • Anxiety    • Asthma    • Chondromalacia of patella     left knee    • Closed wedge compression fracture of T7 vertebra with routine healing 8/11/2020   • Common cold    • Cough    • Diarrhea    • Disorder of gallbladder     Probable biliary dyskinesia    • Epigastric pain    • Foot pain    • Gastroenteritis    • Generalized abdominal pain    • Headache    • History of colonoscopy 03/27/2013    Colon endoscopy 58225 (1)  -  Internal & external hemorrhoids found.   • History of esophagogastroduodenoscopy (EGD) 03/27/2013    w/ tube 57680 (1)  -  Normal esophagus. Gastritis in stomach. Biopsy taken. Normal duodenum. Biospy taken   • History of marijuana use    • HPV (human papilloma virus) infection    • IBS (irritable bowel syndrome)    • Influenza    • Irregular periods    • Irritable bowel syndrome    • Migraine    • Nausea    • Nausea and vomiting    • Osgood-Schlatter's disease    • Pain in throat    • Patellar tendonitis    • Right upper quadrant pain    • Streptococcal sore  throat    • Temporomandibular joint disorder     WISDOM TEETH    • Upper respiratory infection    • Urgency of urination    • Urinary tract infectious disease    • Varicella    • Viral gastroenteritis    • Vulvovaginitis        Past Surgical Hx:  Past Surgical History:   Procedure Laterality Date   • CHOLECYSTECTOMY  06/06/2013    Laparoscopic  -  laparoscopic cholecystectomy with intraoperative cholangiogram. Cholecystitis.   • INJECTION OF MEDICATION  01/08/2013    Toradol (1)   -  W. Sotomayor   • LAPAROSCOPIC CHOLECYSTECTOMY     • WISDOM TOOTH EXTRACTION         Current Meds:    Current Outpatient Medications:   •  albuterol sulfate HFA (Ventolin HFA) 108 (90 Base) MCG/ACT inhaler, Inhale 2 puffs Every 4 (Four) Hours As Needed for Wheezing., Disp: 6.7 g, Rfl: 0  •  benzonatate (TESSALON) 200 MG capsule, Take 1 capsule by mouth 3 (Three) Times a Day As Needed for Cough., Disp: 30 capsule, Rfl: 0  •  cloNIDine (Catapres) 0.3 MG tablet, Take 1 tablet by mouth 2 (Two) Times a Day., Disp: 60 tablet, Rfl: 3  •  conjugated estrogens (Premarin) 0.625 MG/GM vaginal cream, Insert 0.5 grams vaginally at bedtime 2 nights per week., Disp: 30 g, Rfl: 6  •  Diclofenac Sodium 2 % solution, Apply 1 each topically 2 (Two) Times a Day As Needed (back)., Disp: 112 g, Rfl: 1  •  doxycycline (VIBRAMYCIN) 100 MG capsule, Take 1 capsule by mouth 2 (Two) Times a Day., Disp: 14 capsule, Rfl: 0  •  guaiFENesin (Mucinex) 600 MG 12 hr tablet, Take 2 tablets by mouth 2 (Two) Times a Day., Disp: 30 tablet, Rfl: 0  •  promethazine-dextromethorphan (PROMETHAZINE-DM) 6.25-15 MG/5ML syrup, Take 5 mL by mouth At Night As Needed for Cough., Disp: 120 mL, Rfl: 0  •  rizatriptan (Maxalt) 5 MG tablet, Take 2 tablets by mouth 1 (One) Time As Needed for Migraine. May repeat in 2 hours if needed, Disp: 18 tablet, Rfl: 0    Allergies:  Allergies   Allergen Reactions   • Aspirin Shortness Of Breath     TIGHT CHEST   • Erythromycin Base Rash   • Latex Rash     " Rash        Family Hx:  Family History   Adopted: Yes   Problem Relation Age of Onset   • Cancer Other    • Diabetes Other    • Heart disease Other    • Hypertension Other    • Stroke Other    • Lung disease Other    • Cholelithiasis Other    • Ulcers Other    • Other Other         Colon problems   • Ovarian cysts Mother    • Bipolar disorder Mother    • Irritable bowel syndrome Mother    • Ovarian cancer Mother    • No Known Problems Sister    • Emphysema Maternal Grandmother    • Heart attack Maternal Grandfather    • No Known Problems Sister    • No Known Problems Sister         Social History:  Social History     Socioeconomic History   • Marital status:    Tobacco Use   • Smoking status: Never Smoker   • Smokeless tobacco: Never Used   Substance and Sexual Activity   • Alcohol use: No   • Drug use: Yes     Types: Marijuana     Comment: stopped with positive pregnancy test    • Sexual activity: Yes     Partners: Male     Birth control/protection: None     Comment: last pap smear 5/17/19 ASCUS        Review of Systems  Review of Systems   Constitutional: Negative for activity change, appetite change and fever.   HENT: Negative for congestion, rhinorrhea, sinus pain and sore throat.    Eyes: Negative for pain, redness and visual disturbance.   Respiratory: Negative for cough, shortness of breath and wheezing.    Cardiovascular: Negative for chest pain, palpitations and leg swelling.   Gastrointestinal: Negative for abdominal pain, constipation, diarrhea, nausea and vomiting.   Genitourinary: Negative for dysuria and flank pain.   Musculoskeletal: Positive for joint swelling. Negative for arthralgias, back pain and myalgias.   Skin: Positive for color change and wound. Negative for pallor and rash.   Neurological: Negative for dizziness, light-headedness and headaches.       Objective:     /62   Pulse 78   Temp 97.9 °F (36.6 °C)   Ht 180.3 cm (71\")   Wt 80.1 kg (176 lb 8 oz)   LMP 12/13/2021 " (Exact Date)   SpO2 99%   BMI 24.62 kg/m²   Physical Exam  Vitals and nursing note reviewed.   Constitutional:       General: She is not in acute distress.     Appearance: Normal appearance. She is well-developed. She is not diaphoretic.   HENT:      Head: Normocephalic and atraumatic.      Right Ear: Hearing normal.      Left Ear: Hearing normal.   Eyes:      General: Lids are normal.      Conjunctiva/sclera: Conjunctivae normal.   Cardiovascular:      Rate and Rhythm: Normal rate and regular rhythm.      Heart sounds: Normal heart sounds.   Pulmonary:      Effort: Pulmonary effort is normal. No respiratory distress.      Breath sounds: Normal breath sounds. No wheezing or rales.   Abdominal:      General: Bowel sounds are normal. There is no distension.      Palpations: Abdomen is soft.      Tenderness: There is no abdominal tenderness.   Musculoskeletal:         General: No tenderness or deformity.      Left hand: Decreased range of motion.      Right lower leg: No edema.      Left lower leg: No edema.      Comments: Swelling around the nail bed. Warmth and erythema appreciated. Difficulty with flexion at PIP and DIP.   Skin:     General: Skin is warm and dry.      Coloration: Skin is not pale.      Findings: No erythema or rash.   Neurological:      Mental Status: She is alert and oriented to person, place, and time. She is not disoriented.              Assessment/Plan:     Diagnoses and all orders for this visit:    1. Paronychia of finger of left hand (Primary)  -     doxycycline (VIBRAMYCIN) 100 MG capsule; Take 1 capsule by mouth 2 (Two) Times a Day.  Dispense: 14 capsule; Refill: 0    2. KESHIA (obstructive sleep apnea)  -     Home Sleep Study; Future    3. Other insomnia  -     cloNIDine (Catapres) 0.3 MG tablet; Take 1 tablet by mouth 2 (Two) Times a Day.  Dispense: 60 tablet; Refill: 3    1. Paronychia of L middle finger as pictured above. Prescribed doxycycline. Advised patient to notify clinic if not  improved next week.     2. Sleep study to evaluate for KESHIA.    · Rx changes: see a/p  · Patient Education: see a/p  · Compliance at present is estimated to be good.     Depression screening: Up to date; last screen 4/1/2021     Follow-up:     Return if symptoms worsen or fail to improve.    Preventative:  Health Maintenance   Topic Date Due   • ANNUAL PHYSICAL  Never done   • INFLUENZA VACCINE  Never done   • COVID-19 Vaccine (3 - Booster for Pfizer series) 02/28/2022   • PAP SMEAR  10/06/2024   • TDAP/TD VACCINES (2 - Td or Tdap) 11/14/2029   • HEPATITIS C SCREENING  Completed   • Pneumococcal Vaccine 0-64  Aged Out     Female Preventative: physical  Recommended: Influenza  Vaccine Counseling: N/A    Weight  -Class: Normal: 18.5-24.9kg/m2  -Patient's Body mass index is 24.62 kg/m². indicating that she is within normal range (BMI 18.5-24.9). No BMI management plan needed..   eat more fruits and vegetables, decrease soda or juice intake, increase water intake, increase physical activity, reduce screen time, reduce portion size and cut out extra servings    Alcohol use:  reports no history of alcohol use.  Nicotine status  reports that she has never smoked. She has never used smokeless tobacco.    Goals     •  Less migraine (pt-stated)       Multiple stressors (divorce, new job)      •  Less pain       T7 compression fracture            RISK SCORE: 3      Shelton Corrigan M.D. PGY3  [facility]  [address] [place] [address]  Office: [phone]  This document has been electronically signed by Shelton Corrigan MD on January 7, 2022 13:04 CST

## 2022-01-11 PROCEDURE — 87635 SARS-COV-2 COVID-19 AMP PRB: CPT | Performed by: FAMILY MEDICINE

## 2022-01-11 NOTE — PROGRESS NOTES
I have reviewed the notes, assessments, and/or procedures performed by Shelton Corrigan MD, I concur with her/his documentation and assessment and plan for Milliemayelin Quevedo.                This document has been electronically signed by Master Johnson MD on January 11, 2022 17:17 CST

## 2022-03-12 ENCOUNTER — APPOINTMENT (OUTPATIENT)
Dept: CT IMAGING | Facility: HOSPITAL | Age: 27
End: 2022-03-12

## 2022-03-12 ENCOUNTER — HOSPITAL ENCOUNTER (EMERGENCY)
Facility: HOSPITAL | Age: 27
Discharge: HOME | End: 2022-03-12
Attending: EMERGENCY MEDICINE | Admitting: EMERGENCY MEDICINE

## 2022-03-12 VITALS
BODY MASS INDEX: 23.8 KG/M2 | DIASTOLIC BLOOD PRESSURE: 64 MMHG | SYSTOLIC BLOOD PRESSURE: 135 MMHG | HEIGHT: 71 IN | RESPIRATION RATE: 18 BRPM | TEMPERATURE: 98.3 F | HEART RATE: 108 BPM | WEIGHT: 170 LBS | OXYGEN SATURATION: 97 %

## 2022-03-12 DIAGNOSIS — M79.10 MYALGIA: ICD-10-CM

## 2022-03-12 DIAGNOSIS — K52.9 GASTROENTERITIS: Primary | ICD-10-CM

## 2022-03-12 LAB
ALBUMIN SERPL-MCNC: 4.9 G/DL (ref 3.5–5.2)
ALBUMIN/GLOB SERPL: 1.7 G/DL
ALP SERPL-CCNC: 77 U/L (ref 39–117)
ALT SERPL W P-5'-P-CCNC: 28 U/L (ref 1–33)
ANION GAP SERPL CALCULATED.3IONS-SCNC: 14 MMOL/L (ref 5–15)
AST SERPL-CCNC: 21 U/L (ref 1–32)
B-HCG UR QL: NEGATIVE
BACTERIA UR QL AUTO: ABNORMAL /HPF
BASOPHILS # BLD AUTO: 0.03 10*3/MM3 (ref 0–0.2)
BASOPHILS NFR BLD AUTO: 0.2 % (ref 0–1.5)
BILIRUB SERPL-MCNC: 0.5 MG/DL (ref 0–1.2)
BILIRUB UR QL STRIP: NEGATIVE
BUN SERPL-MCNC: 11 MG/DL (ref 6–20)
BUN/CREAT SERPL: 14.7 (ref 7–25)
CALCIUM SPEC-SCNC: 9.3 MG/DL (ref 8.6–10.5)
CHLORIDE SERPL-SCNC: 105 MMOL/L (ref 98–107)
CLARITY UR: ABNORMAL
CO2 SERPL-SCNC: 20 MMOL/L (ref 22–29)
COLOR UR: YELLOW
CREAT SERPL-MCNC: 0.75 MG/DL (ref 0.57–1)
D-LACTATE SERPL-SCNC: 1.2 MMOL/L (ref 0.5–2)
D-LACTATE SERPL-SCNC: 2.3 MMOL/L (ref 0.5–2)
DEPRECATED RDW RBC AUTO: 40.7 FL (ref 37–54)
EGFRCR SERPLBLD CKD-EPI 2021: 112.1 ML/MIN/1.73
EOSINOPHIL # BLD AUTO: 0 10*3/MM3 (ref 0–0.4)
EOSINOPHIL NFR BLD AUTO: 0 % (ref 0.3–6.2)
ERYTHROCYTE [DISTWIDTH] IN BLOOD BY AUTOMATED COUNT: 13.1 % (ref 12.3–15.4)
FLUAV RNA RESP QL NAA+PROBE: NOT DETECTED
FLUBV RNA RESP QL NAA+PROBE: NOT DETECTED
GLOBULIN UR ELPH-MCNC: 2.9 GM/DL
GLUCOSE SERPL-MCNC: 143 MG/DL (ref 65–99)
GLUCOSE UR STRIP-MCNC: NEGATIVE MG/DL
HCT VFR BLD AUTO: 39.2 % (ref 34–46.6)
HGB BLD-MCNC: 13.6 G/DL (ref 12–15.9)
HGB UR QL STRIP.AUTO: NEGATIVE
HOLD SPECIMEN: NORMAL
HOLD SPECIMEN: NORMAL
HYALINE CASTS UR QL AUTO: ABNORMAL /LPF
IMM GRANULOCYTES # BLD AUTO: 0.09 10*3/MM3 (ref 0–0.05)
IMM GRANULOCYTES NFR BLD AUTO: 0.7 % (ref 0–0.5)
KETONES UR QL STRIP: ABNORMAL
LEUKOCYTE ESTERASE UR QL STRIP.AUTO: ABNORMAL
LIPASE SERPL-CCNC: 11 U/L (ref 13–60)
LYMPHOCYTES # BLD AUTO: 0.42 10*3/MM3 (ref 0.7–3.1)
LYMPHOCYTES NFR BLD AUTO: 3.3 % (ref 19.6–45.3)
MAGNESIUM SERPL-MCNC: 1.7 MG/DL (ref 1.6–2.6)
MCH RBC QN AUTO: 30.3 PG (ref 26.6–33)
MCHC RBC AUTO-ENTMCNC: 34.7 G/DL (ref 31.5–35.7)
MCV RBC AUTO: 87.3 FL (ref 79–97)
MONOCYTES # BLD AUTO: 0.42 10*3/MM3 (ref 0.1–0.9)
MONOCYTES NFR BLD AUTO: 3.3 % (ref 5–12)
NEUTROPHILS NFR BLD AUTO: 11.58 10*3/MM3 (ref 1.7–7)
NEUTROPHILS NFR BLD AUTO: 92.5 % (ref 42.7–76)
NITRITE UR QL STRIP: NEGATIVE
NRBC BLD AUTO-RTO: 0 /100 WBC (ref 0–0.2)
PH UR STRIP.AUTO: 5.5 [PH] (ref 5–9)
PLATELET # BLD AUTO: 267 10*3/MM3 (ref 140–450)
PMV BLD AUTO: 9.9 FL (ref 6–12)
POTASSIUM SERPL-SCNC: 3.2 MMOL/L (ref 3.5–5.2)
PROT SERPL-MCNC: 7.8 G/DL (ref 6–8.5)
PROT UR QL STRIP: NEGATIVE
RBC # BLD AUTO: 4.49 10*6/MM3 (ref 3.77–5.28)
RBC # UR STRIP: ABNORMAL /HPF
REF LAB TEST METHOD: ABNORMAL
SARS-COV-2 RNA RESP QL NAA+PROBE: NOT DETECTED
SODIUM SERPL-SCNC: 139 MMOL/L (ref 136–145)
SP GR UR STRIP: 1.03 (ref 1–1.03)
SQUAMOUS #/AREA URNS HPF: ABNORMAL /HPF
UROBILINOGEN UR QL STRIP: ABNORMAL
WBC # UR STRIP: ABNORMAL /HPF
WBC NRBC COR # BLD: 12.54 10*3/MM3 (ref 3.4–10.8)
WHOLE BLOOD HOLD SPECIMEN: NORMAL
WHOLE BLOOD HOLD SPECIMEN: NORMAL

## 2022-03-12 PROCEDURE — 99283 EMERGENCY DEPT VISIT LOW MDM: CPT

## 2022-03-12 PROCEDURE — 83605 ASSAY OF LACTIC ACID: CPT | Performed by: EMERGENCY MEDICINE

## 2022-03-12 PROCEDURE — 83690 ASSAY OF LIPASE: CPT | Performed by: EMERGENCY MEDICINE

## 2022-03-12 PROCEDURE — 25010000002 CEFTRIAXONE PER 250 MG: Performed by: EMERGENCY MEDICINE

## 2022-03-12 PROCEDURE — 80053 COMPREHEN METABOLIC PANEL: CPT | Performed by: EMERGENCY MEDICINE

## 2022-03-12 PROCEDURE — 25010000002 KETOROLAC TROMETHAMINE PER 15 MG: Performed by: EMERGENCY MEDICINE

## 2022-03-12 PROCEDURE — 25010000002 MORPHINE PER 10 MG: Performed by: EMERGENCY MEDICINE

## 2022-03-12 PROCEDURE — 87040 BLOOD CULTURE FOR BACTERIA: CPT

## 2022-03-12 PROCEDURE — 85025 COMPLETE CBC W/AUTO DIFF WBC: CPT

## 2022-03-12 PROCEDURE — 93005 ELECTROCARDIOGRAM TRACING: CPT

## 2022-03-12 PROCEDURE — 87636 SARSCOV2 & INF A&B AMP PRB: CPT | Performed by: EMERGENCY MEDICINE

## 2022-03-12 PROCEDURE — 96375 TX/PRO/DX INJ NEW DRUG ADDON: CPT

## 2022-03-12 PROCEDURE — 25010000002 ONDANSETRON PER 1 MG: Performed by: EMERGENCY MEDICINE

## 2022-03-12 PROCEDURE — 25010000002 PROCHLORPERAZINE 10 MG/2ML SOLUTION: Performed by: EMERGENCY MEDICINE

## 2022-03-12 PROCEDURE — 74177 CT ABD & PELVIS W/CONTRAST: CPT

## 2022-03-12 PROCEDURE — 96376 TX/PRO/DX INJ SAME DRUG ADON: CPT

## 2022-03-12 PROCEDURE — 96365 THER/PROPH/DIAG IV INF INIT: CPT

## 2022-03-12 PROCEDURE — 25010000002 MAGNESIUM SULFATE IN D5W 1G/100ML (PREMIX) 1-5 GM/100ML-% SOLUTION: Performed by: EMERGENCY MEDICINE

## 2022-03-12 PROCEDURE — 81025 URINE PREGNANCY TEST: CPT

## 2022-03-12 PROCEDURE — 83735 ASSAY OF MAGNESIUM: CPT | Performed by: EMERGENCY MEDICINE

## 2022-03-12 PROCEDURE — 36415 COLL VENOUS BLD VENIPUNCTURE: CPT

## 2022-03-12 PROCEDURE — 93010 ELECTROCARDIOGRAM REPORT: CPT | Performed by: INTERNAL MEDICINE

## 2022-03-12 PROCEDURE — 81001 URINALYSIS AUTO W/SCOPE: CPT

## 2022-03-12 PROCEDURE — 99284 EMERGENCY DEPT VISIT MOD MDM: CPT

## 2022-03-12 PROCEDURE — 93005 ELECTROCARDIOGRAM TRACING: CPT | Performed by: EMERGENCY MEDICINE

## 2022-03-12 PROCEDURE — 25010000002 IOPAMIDOL 61 % SOLUTION: Performed by: EMERGENCY MEDICINE

## 2022-03-12 PROCEDURE — 87040 BLOOD CULTURE FOR BACTERIA: CPT | Performed by: EMERGENCY MEDICINE

## 2022-03-12 RX ORDER — ONDANSETRON 2 MG/ML
4 INJECTION INTRAMUSCULAR; INTRAVENOUS ONCE
Status: COMPLETED | OUTPATIENT
Start: 2022-03-12 | End: 2022-03-12

## 2022-03-12 RX ORDER — MAGNESIUM SULFATE 1 G/100ML
1 INJECTION INTRAVENOUS ONCE
Status: COMPLETED | OUTPATIENT
Start: 2022-03-12 | End: 2022-03-12

## 2022-03-12 RX ORDER — POTASSIUM CHLORIDE 750 MG/1
40 CAPSULE, EXTENDED RELEASE ORAL ONCE
Status: COMPLETED | OUTPATIENT
Start: 2022-03-12 | End: 2022-03-12

## 2022-03-12 RX ORDER — MORPHINE SULFATE 2 MG/ML
2 INJECTION, SOLUTION INTRAMUSCULAR; INTRAVENOUS ONCE
Status: COMPLETED | OUTPATIENT
Start: 2022-03-12 | End: 2022-03-12

## 2022-03-12 RX ORDER — ACETAMINOPHEN 500 MG
1000 TABLET ORAL ONCE
Status: COMPLETED | OUTPATIENT
Start: 2022-03-12 | End: 2022-03-12

## 2022-03-12 RX ORDER — KETOROLAC TROMETHAMINE 30 MG/ML
30 INJECTION, SOLUTION INTRAMUSCULAR; INTRAVENOUS ONCE
Status: COMPLETED | OUTPATIENT
Start: 2022-03-12 | End: 2022-03-12

## 2022-03-12 RX ORDER — SODIUM CHLORIDE 0.9 % (FLUSH) 0.9 %
10 SYRINGE (ML) INJECTION AS NEEDED
Status: DISCONTINUED | OUTPATIENT
Start: 2022-03-12 | End: 2022-03-13 | Stop reason: HOSPADM

## 2022-03-12 RX ORDER — PROCHLORPERAZINE EDISYLATE 5 MG/ML
10 INJECTION INTRAMUSCULAR; INTRAVENOUS ONCE
Status: COMPLETED | OUTPATIENT
Start: 2022-03-12 | End: 2022-03-12

## 2022-03-12 RX ADMIN — ONDANSETRON 4 MG: 2 INJECTION INTRAMUSCULAR; INTRAVENOUS at 20:02

## 2022-03-12 RX ADMIN — MAGNESIUM SULFATE HEPTAHYDRATE 1 G: 1 INJECTION, SOLUTION INTRAVENOUS at 20:54

## 2022-03-12 RX ADMIN — IOPAMIDOL 90 ML: 612 INJECTION, SOLUTION INTRAVENOUS at 22:30

## 2022-03-12 RX ADMIN — ACETAMINOPHEN 1000 MG: 500 TABLET, FILM COATED ORAL at 22:54

## 2022-03-12 RX ADMIN — SODIUM CHLORIDE 1000 ML: 9 INJECTION, SOLUTION INTRAVENOUS at 20:02

## 2022-03-12 RX ADMIN — ONDANSETRON 4 MG: 2 INJECTION INTRAMUSCULAR; INTRAVENOUS at 21:04

## 2022-03-12 RX ADMIN — PROCHLORPERAZINE EDISYLATE 10 MG: 5 INJECTION INTRAMUSCULAR; INTRAVENOUS at 22:11

## 2022-03-12 RX ADMIN — CEFTRIAXONE SODIUM 1 G: 10 INJECTION, POWDER, FOR SOLUTION INTRAVENOUS at 22:14

## 2022-03-12 RX ADMIN — POTASSIUM CHLORIDE 40 MEQ: 750 CAPSULE, EXTENDED RELEASE ORAL at 20:56

## 2022-03-12 RX ADMIN — MORPHINE SULFATE 2 MG: 2 INJECTION, SOLUTION INTRAMUSCULAR; INTRAVENOUS at 20:02

## 2022-03-12 RX ADMIN — KETOROLAC TROMETHAMINE 30 MG: 30 INJECTION, SOLUTION INTRAMUSCULAR; INTRAVENOUS at 20:53

## 2022-03-13 NOTE — ED PROVIDER NOTES
Subjective   26 year old female presents to the emergency department with complaint of nausea, vomiting, diarrhea, chest and abdominal pain.  She also complains of myalgias.  Reports fever and chills.  No known sick contacts.  Chest and abdominal pain began after persistent vomiting and diarrhea.    Family history, surgical history, social history, current medications and allergies are reviewed with the patient and triage documentation and vitals are reviewed.      History provided by:  Patient   used: No        Review of Systems   Constitutional: Positive for chills, fatigue and fever.   HENT: Negative for congestion and sore throat.    Eyes: Negative for photophobia and visual disturbance.   Respiratory: Negative for cough and shortness of breath.    Cardiovascular: Positive for chest pain. Negative for palpitations and leg swelling.   Gastrointestinal: Positive for abdominal pain, diarrhea and vomiting.   Endocrine: Negative for polydipsia, polyphagia and polyuria.   Genitourinary: Negative for dysuria and urgency.   Musculoskeletal: Positive for myalgias.   Skin: Negative for rash and wound.   Allergic/Immunologic: Negative.    Neurological: Negative for weakness, light-headedness and numbness.   Hematological: Negative.    Psychiatric/Behavioral: Negative.        Past Medical History:   Diagnosis Date   • Abnormal Pap smear of cervix    • Acute maxillary sinusitis    • Acute otitis externa    • Acute pharyngitis    • Acute tonsillitis    • Allergic asthma     IgE-mediated allergic asthma     • Allergic rhinitis    • Allergic rhinitis due to pollen    • Anxiety    • Asthma    • Chondromalacia of patella     left knee    • Closed wedge compression fracture of T7 vertebra with routine healing 8/11/2020   • Common cold    • Cough    • Diarrhea    • Disorder of gallbladder     Probable biliary dyskinesia    • Epigastric pain    • Foot pain    • Gastroenteritis    • Generalized abdominal pain    •  Headache    • History of colonoscopy 03/27/2013    Colon endoscopy 62455 (1)  -  Internal & external hemorrhoids found.   • History of esophagogastroduodenoscopy (EGD) 03/27/2013    w/ tube 11857 (1)  -  Normal esophagus. Gastritis in stomach. Biopsy taken. Normal duodenum. Biospy taken   • History of marijuana use    • HPV (human papilloma virus) infection    • IBS (irritable bowel syndrome)    • Influenza    • Irregular periods    • Irritable bowel syndrome    • Migraine    • Nausea    • Nausea and vomiting    • Osgood-Schlatter's disease    • Pain in throat    • Patellar tendonitis    • Right upper quadrant pain    • Streptococcal sore throat    • Temporomandibular joint disorder     WISDOM TEETH    • Upper respiratory infection    • Urgency of urination    • Urinary tract infectious disease    • Varicella    • Viral gastroenteritis    • Vulvovaginitis        Allergies   Allergen Reactions   • Aspirin Shortness Of Breath     TIGHT CHEST   • Erythromycin Base Rash   • Latex Rash     Rash        Past Surgical History:   Procedure Laterality Date   • CHOLECYSTECTOMY  06/06/2013    Laparoscopic  -  laparoscopic cholecystectomy with intraoperative cholangiogram. Cholecystitis.   • INJECTION OF MEDICATION  01/08/2013    Toradol (1)   -  FLORY Sotomayor   • LAPAROSCOPIC CHOLECYSTECTOMY     • WISDOM TOOTH EXTRACTION         Family History   Adopted: Yes   Problem Relation Age of Onset   • Cancer Other    • Diabetes Other    • Heart disease Other    • Hypertension Other    • Stroke Other    • Lung disease Other    • Cholelithiasis Other    • Ulcers Other    • Other Other         Colon problems   • Ovarian cysts Mother    • Bipolar disorder Mother    • Irritable bowel syndrome Mother    • Ovarian cancer Mother    • No Known Problems Sister    • Emphysema Maternal Grandmother    • Heart attack Maternal Grandfather    • No Known Problems Sister    • No Known Problems Sister        Social History     Socioeconomic History   •  Marital status:    Tobacco Use   • Smoking status: Never Smoker   • Smokeless tobacco: Never Used   Substance and Sexual Activity   • Alcohol use: No   • Drug use: Yes     Types: Marijuana     Comment: stopped with positive pregnancy test    • Sexual activity: Yes     Partners: Male     Birth control/protection: None     Comment: last pap smear 5/17/19 ASCUS            Objective   Physical Exam  Vitals and nursing note reviewed.   Constitutional:       General: She is not in acute distress.     Appearance: She is well-developed and normal weight. She is not ill-appearing, toxic-appearing or diaphoretic.   HENT:      Head: Normocephalic.   Eyes:      Pupils: Pupils are equal, round, and reactive to light.   Neck:      Vascular: No JVD.   Cardiovascular:      Rate and Rhythm: Normal rate and regular rhythm.      Pulses:           Radial pulses are 2+ on the right side and 2+ on the left side.        Dorsalis pedis pulses are 2+ on the right side and 2+ on the left side.      Heart sounds: Normal heart sounds. No murmur heard.  Pulmonary:      Effort: Pulmonary effort is normal. No tachypnea or accessory muscle usage.      Breath sounds: No decreased breath sounds, wheezing, rhonchi or rales.   Chest:      Chest wall: No tenderness or crepitus.   Abdominal:      General: Bowel sounds are normal.      Palpations: Abdomen is soft. There is no hepatomegaly or splenomegaly.      Tenderness: There is no abdominal tenderness.   Musculoskeletal:         General: Normal range of motion.      Cervical back: Normal range of motion and neck supple.      Right lower leg: No tenderness. No edema.      Left lower leg: No tenderness. No edema.   Skin:     General: Skin is warm and dry.      Capillary Refill: Capillary refill takes less than 2 seconds.   Neurological:      General: No focal deficit present.      Mental Status: She is alert and oriented to person, place, and time.   Psychiatric:         Mood and Affect: Mood is  anxious.         Behavior: Behavior normal.         Procedures  none         ED Course      Labs Reviewed   COMPREHENSIVE METABOLIC PANEL - Abnormal; Notable for the following components:       Result Value    Glucose 143 (*)     Potassium 3.2 (*)     CO2 20.0 (*)     All other components within normal limits    Narrative:     GFR Normal >60  Chronic Kidney Disease <60  Kidney Failure <15     LACTIC ACID, PLASMA - Abnormal; Notable for the following components:    Lactate 2.3 (*)     All other components within normal limits   URINALYSIS W/ MICROSCOPIC IF INDICATED (NO CULTURE) - Abnormal; Notable for the following components:    Appearance, UA Cloudy (*)     Specific Gravity, UA 1.031 (*)     Ketones, UA 40 mg/dL (2+) (*)     Leuk Esterase, UA Small (1+) (*)     All other components within normal limits   CBC WITH AUTO DIFFERENTIAL - Abnormal; Notable for the following components:    WBC 12.54 (*)     Neutrophil % 92.5 (*)     Lymphocyte % 3.3 (*)     Monocyte % 3.3 (*)     Eosinophil % 0.0 (*)     Immature Grans % 0.7 (*)     Neutrophils, Absolute 11.58 (*)     Lymphocytes, Absolute 0.42 (*)     Immature Grans, Absolute 0.09 (*)     All other components within normal limits   URINALYSIS, MICROSCOPIC ONLY - Abnormal; Notable for the following components:    RBC, UA 0-2 (*)     WBC, UA 13-20 (*)     Bacteria, UA 2+ (*)     Squamous Epithelial Cells, UA 6-12 (*)     All other components within normal limits   LIPASE - Abnormal; Notable for the following components:    Lipase 11 (*)     All other components within normal limits   COVID-19 AND FLU A/B, NP SWAB IN TRANSPORT MEDIA 8-12 HR TAT - Normal    Narrative:     Fact sheet for providers: https://www.fda.gov/media/012279/download    Fact sheet for patients: https://www.fda.gov/media/922297/download    Test performed by PCR.   PREGNANCY, URINE - Normal   MAGNESIUM - Normal   LACTIC ACID, REFLEX - Normal   BLOOD CULTURE   BLOOD CULTURE   RAINBOW DRAW    Narrative:      The following orders were created for panel order [facility] Draw.  Procedure                               Abnormality         Status                     ---------                               -----------         ------                     Green Top (Gel)[[id number]]                                  Final result               Lavender Top[[id number]]                                     Final result               Gold Top - SST[[id number]]                                   Final result               Light Blue Top[[id number]]                                   Final result                 Please view results for these tests on the individual orders.   CBC AND DIFFERENTIAL    Narrative:     The following orders were created for panel order CBC & Differential.  Procedure                               Abnormality         Status                     ---------                               -----------         ------                     CBC Auto Differential[[id number]]        Abnormal            Final result                 Please view results for these tests on the individual orders.   GREEN TOP   LAVENDER TOP   GOLD TOP - SST   LIGHT BLUE TOP     CT Abdomen Pelvis With Contrast    Result Date: 3/12/2022  Narrative: EXAM:   CT Abdomen and Pelvis With Intravenous Contrast CLINICAL HISTORY:   The patient is 26 years old and is Female; abd pain, vomiting and diarrhea TECHNIQUE:   Axial computed tomography images of the abdomen and pelvis with intravenous contrast.  Sagittal and coronal reformatted images were created and reviewed.  This CT exam was performed using one or more of the following dose reduction techniques:  automated exposure control, adjustment of the mA and/or kV according to patient size, and/or use of iterative reconstruction technique. COMPARISON:   CT Abdomen Pelvis dated April 16 2021 FINDINGS:   LUNG BASES:  Unremarkable.  No mass.  No consolidation.  ABDOMEN:   LIVER:  Unremarkable.  No mass.   GALLBLADDER  AND BILE DUCTS:  Surgical clips are present in the right upper quadrant, consistent with previous cholecystectomy.   PANCREAS:  No ductal dilation.  No mass.   SPLEEN:  A splenule is present within left upper quadrant. The spleen is unremarkable.   ADRENALS:  Unremarkable.  No mass.   KIDNEYS AND URETERS:  A few calcifications are present within the right kidney. There is no hydronephrosis or hydroureter of either kidney. No obstructing renal or ureteral calculus is seen.   STOMACH AND BOWEL:  The stomach is distended with fluid and air. The small bowel is fluid-filled. Stool is present throughout colon. There is no mucosal thickening or evidence of bowel obstruction.  PELVIS:   APPENDIX:  The appendix is normal in caliber without surrounding inflammation.   BLADDER:  Unremarkable.  No mass.   REPRODUCTIVE:  Unremarkable as visualized.  ABDOMEN and PELVIS:   INTRAPERITONEAL SPACE:  Unremarkable.  No free air.  No significant fluid collection.   BONES/JOINTS:  No acute fracture.   SOFT TISSUES:  The soft tissues are normal.   VASCULATURE:  Unremarkable.  No abdominal aortic aneurysm.   LYMPH NODES:  Scattered central mesenteric lymph nodes are present.     Impression:   No acute findings on this contrasted CT of the abdomen and pelvis to explain the patient's symptoms. Electronically signed by:  Raquel Beckett MD  3/12/2022 10:48 PM CST Workstation: [id number]      EKG March 12, 2022 at 1909 reveals normal sinus rhythm rate 83 bpm.  T wave flattening in aVL with inversion.  No ST elevation or depression.  No evidence of acute ischemia.  .        MDM  Number of Diagnoses or Management Options     Amount and/or Complexity of Data Reviewed  Clinical lab tests: reviewed  Tests in the medicine section of CPT®: reviewed    Patient Progress  Patient progress: stable    Patient has improvement of symptoms with symptomatic treatment in the emergency department.  No evidence of acute cause and is likely gastroenteritis  from viral illness that has been in the community recently.  Advised on continued symptomatic treatment and reasons to return to the emergency department.  Agreeable to discharge plan.    Final diagnoses:   Myalgia   Gastroenteritis         ED Disposition  ED Disposition     ED Disposition   Discharge    Condition   Stable    Comment   --             Shelton Corrigan MD  [address] [place] [address] [address] [place] [address] [phone]      As needed         Medication List      No changes were made to your prescriptions during this visit.          Agustin Barfield,   03/12/22 [id number]

## 2022-03-14 ENCOUNTER — TELEPHONE (OUTPATIENT)
Dept: FAMILY MEDICINE CLINIC | Facility: CLINIC | Age: 27
End: 2022-03-14

## 2022-03-14 DIAGNOSIS — G47.09 OTHER INSOMNIA: ICD-10-CM

## 2022-03-14 RX ORDER — CLONIDINE HYDROCHLORIDE 0.3 MG/1
0.3 TABLET ORAL 2 TIMES DAILY
Qty: 60 TABLET | Refills: 0 | Status: SHIPPED | OUTPATIENT
Start: 2022-03-14 | End: 2022-04-12 | Stop reason: SDUPTHER

## 2022-03-14 RX ORDER — CLONIDINE HYDROCHLORIDE 0.3 MG/1
TABLET ORAL
Qty: 180 TABLET | Refills: 1 | OUTPATIENT
Start: 2022-03-14

## 2022-03-14 NOTE — PROGRESS NOTES
Patient messaged stating she was out of Clonidine. Will refill one month and discuss at follow-up appointment on 3/29. May benefit from patch.       Shelton Corrigan M.D. PGY3  [facility]  [address] [place] [address]  Office: [phone]  This document has been electronically signed by Shelton Corrigan MD on March 14, 2022 10:51 CDT

## 2022-03-14 NOTE — TELEPHONE ENCOUNTER
Called pt per Dr Corrigan and asked how the pt was taking her clodine. Pt said she takes 2 per night.

## 2022-03-15 RX ORDER — PROMETHAZINE HYDROCHLORIDE 25 MG/1
25 TABLET ORAL EVERY 6 HOURS PRN
Qty: 30 TABLET | Refills: 0 | Status: SHIPPED | OUTPATIENT
Start: 2022-03-15 | End: 2022-05-13

## 2022-03-17 LAB
BACTERIA SPEC AEROBE CULT: NORMAL
BACTERIA SPEC AEROBE CULT: NORMAL

## 2022-03-21 ENCOUNTER — OFFICE VISIT (OUTPATIENT)
Dept: BEHAVIORAL HEALTH | Facility: CLINIC | Age: 27
End: 2022-03-21

## 2022-03-21 DIAGNOSIS — F41.1 GENERALIZED ANXIETY DISORDER: ICD-10-CM

## 2022-03-21 DIAGNOSIS — F90.2 ATTENTION DEFICIT HYPERACTIVITY DISORDER, COMBINED TYPE: ICD-10-CM

## 2022-03-21 PROCEDURE — 96130 PSYCL TST EVAL PHYS/QHP 1ST: CPT | Performed by: PSYCHOLOGIST

## 2022-03-29 ENCOUNTER — OFFICE VISIT (OUTPATIENT)
Dept: FAMILY MEDICINE CLINIC | Facility: CLINIC | Age: 27
End: 2022-03-29

## 2022-03-29 ENCOUNTER — LAB (OUTPATIENT)
Dept: LAB | Facility: HOSPITAL | Age: 27
End: 2022-03-29

## 2022-03-29 VITALS
HEART RATE: 83 BPM | HEIGHT: 71 IN | SYSTOLIC BLOOD PRESSURE: 120 MMHG | DIASTOLIC BLOOD PRESSURE: 82 MMHG | BODY MASS INDEX: 24.64 KG/M2 | WEIGHT: 176 LBS | OXYGEN SATURATION: 99 %

## 2022-03-29 DIAGNOSIS — Z20.2 EXPOSURE TO SEXUALLY TRANSMITTED DISEASE (STD): ICD-10-CM

## 2022-03-29 DIAGNOSIS — G47.09 OTHER INSOMNIA: ICD-10-CM

## 2022-03-29 DIAGNOSIS — Z20.2 EXPOSURE TO SEXUALLY TRANSMITTED DISEASE (STD): Primary | ICD-10-CM

## 2022-03-29 PROCEDURE — 86592 SYPHILIS TEST NON-TREP QUAL: CPT

## 2022-03-29 PROCEDURE — 86696 HERPES SIMPLEX TYPE 2 TEST: CPT

## 2022-03-29 PROCEDURE — 36415 COLL VENOUS BLD VENIPUNCTURE: CPT

## 2022-03-29 PROCEDURE — 99213 OFFICE O/P EST LOW 20 MIN: CPT | Performed by: STUDENT IN AN ORGANIZED HEALTH CARE EDUCATION/TRAINING PROGRAM

## 2022-03-29 PROCEDURE — G0432 EIA HIV-1/HIV-2 SCREEN: HCPCS

## 2022-03-29 PROCEDURE — 86695 HERPES SIMPLEX TYPE 1 TEST: CPT

## 2022-03-29 RX ORDER — QUETIAPINE FUMARATE 25 MG/1
25 TABLET, FILM COATED ORAL NIGHTLY
Qty: 30 TABLET | Refills: 1 | Status: SHIPPED | OUTPATIENT
Start: 2022-03-29 | End: 2022-05-03 | Stop reason: SDUPTHER
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
## 2022-03-29 NOTE — PROGRESS NOTES
Family Medicine Residency  Shelton Corrigan MD    Subjective:     Millie Quevedo is a 26 y.o. female who presents for insomnia and STD testing. Patient reports her  has been unfaithful with another woman who recently tested positive for herpes.  has been cheating for past 3 months and not using barrier protection with either woman. Patient denies any personal history of STDs. Denies any warts, ulcers, drainage, pruritus. Pap smear last year was normal.     Also has a long history of insomnia since she was 13-14 years old. Currently on Clonidine 0.6mg. Previously was effective but is now waking up at ~4 hours of sleep. Has proper sleep hygiene. Been on Trazodone but built a tolerance to that as well. Discussed sleep study previously but patient got COVID and unable to complete study.    The following portions of the patient's history were reviewed and updated as appropriate: allergies, current medications, past family history, past medical history, past social history, past surgical history and problem list.    Past Medical Hx:  Past Medical History:   Diagnosis Date   • Abnormal Pap smear of cervix    • Acute maxillary sinusitis    • Acute otitis externa    • Acute pharyngitis    • Acute tonsillitis    • Allergic asthma     IgE-mediated allergic asthma     • Allergic rhinitis    • Allergic rhinitis due to pollen    • Anxiety    • Asthma    • Chondromalacia of patella     left knee    • Closed wedge compression fracture of T7 vertebra with routine healing 8/11/2020   • Common cold    • Cough    • Diarrhea    • Disorder of gallbladder     Probable biliary dyskinesia    • Epigastric pain    • Foot pain    • Gastroenteritis    • Generalized abdominal pain    • Headache    • History of colonoscopy 03/27/2013    Colon endoscopy 25485 (1)  -  Internal & external hemorrhoids found.   • History of esophagogastroduodenoscopy (EGD) 03/27/2013    w/ tube 80077 (1)  -  Normal esophagus. Gastritis in  stomach. Biopsy taken. Normal duodenum. Biospy taken   • History of marijuana use    • HPV (human papilloma virus) infection    • IBS (irritable bowel syndrome)    • Influenza    • Irregular periods    • Irritable bowel syndrome    • Migraine    • Nausea    • Nausea and vomiting    • Osgood-Schlatter's disease    • Pain in throat    • Patellar tendonitis    • Right upper quadrant pain    • Streptococcal sore throat    • Temporomandibular joint disorder     WISDOM TEETH    • Upper respiratory infection    • Urgency of urination    • Urinary tract infectious disease    • Varicella    • Viral gastroenteritis    • Vulvovaginitis        Past Surgical Hx:  Past Surgical History:   Procedure Laterality Date   • CHOLECYSTECTOMY  06/06/2013    Laparoscopic  -  laparoscopic cholecystectomy with intraoperative cholangiogram. Cholecystitis.   • INJECTION OF MEDICATION  01/08/2013    Toradol (1)   -  WOdalis Sotomayor   • LAPAROSCOPIC CHOLECYSTECTOMY     • WISDOM TOOTH EXTRACTION         Current Meds:    Current Outpatient Medications:   •  cloNIDine (Catapres) 0.3 MG tablet, Take 1 tablet by mouth 2 (Two) Times a Day., Disp: 60 tablet, Rfl: 0  •  promethazine (PHENERGAN) 25 MG tablet, Take 1 tablet by mouth Every 6 (Six) Hours As Needed for Nausea or Vomiting., Disp: 30 tablet, Rfl: 0  •  albuterol sulfate HFA (Ventolin HFA) 108 (90 Base) MCG/ACT inhaler, Inhale 2 puffs Every 4 (Four) Hours As Needed for Wheezing., Disp: 6.7 g, Rfl: 0  •  QUEtiapine (SEROquel) 25 MG tablet, Take 1 tablet by mouth Every Night., Disp: 30 tablet, Rfl: 1    Allergies:  Allergies   Allergen Reactions   • Aspirin Shortness Of Breath     TIGHT CHEST   • Erythromycin Base Rash   • Latex Rash     Rash        Family Hx:  Family History   Adopted: Yes   Problem Relation Age of Onset   • Cancer Other    • Diabetes Other    • Heart disease Other    • Hypertension Other    • Stroke Other    • Lung disease Other    • Cholelithiasis Other    • Ulcers Other    • Other  "Other         Colon problems   • Ovarian cysts Mother    • Bipolar disorder Mother    • Irritable bowel syndrome Mother    • Ovarian cancer Mother    • No Known Problems Sister    • Emphysema Maternal Grandmother    • Heart attack Maternal Grandfather    • No Known Problems Sister    • No Known Problems Sister         Social History:  Social History     Socioeconomic History   • Marital status:    Tobacco Use   • Smoking status: Never Smoker   • Smokeless tobacco: Never Used   Substance and Sexual Activity   • Alcohol use: No   • Drug use: Yes     Types: Marijuana     Comment: stopped with positive pregnancy test    • Sexual activity: Yes     Partners: Male     Birth control/protection: None     Comment: last pap smear 5/17/19 ASCUS        Review of Systems  Review of Systems   Constitutional: Negative for activity change, appetite change, fatigue and fever.   HENT: Negative for congestion, rhinorrhea, sinus pain and sore throat.    Eyes: Negative for pain, redness and visual disturbance.   Respiratory: Negative for cough, shortness of breath and wheezing.    Cardiovascular: Negative for chest pain, palpitations and leg swelling.   Gastrointestinal: Negative for abdominal pain, constipation, diarrhea, nausea and vomiting.   Genitourinary: Negative for dysuria and flank pain.   Musculoskeletal: Negative for arthralgias, back pain, joint swelling and myalgias.   Skin: Negative for color change, pallor and rash.   Neurological: Negative for dizziness, light-headedness and headaches.   Psychiatric/Behavioral: Positive for sleep disturbance.       Objective:     /82   Pulse 83   Ht 180.3 cm (71\")   Wt 79.8 kg (176 lb)   SpO2 99%   BMI 24.55 kg/m²   Physical Exam  Vitals and nursing note reviewed.   Constitutional:       General: She is not in acute distress.     Appearance: Normal appearance. She is well-developed. She is not diaphoretic.   HENT:      Head: Normocephalic and atraumatic.      Right Ear: " Hearing normal.      Left Ear: Hearing normal.   Eyes:      General: Lids are normal.      Conjunctiva/sclera: Conjunctivae normal.   Cardiovascular:      Rate and Rhythm: Normal rate and regular rhythm.      Heart sounds: Normal heart sounds.   Pulmonary:      Effort: Pulmonary effort is normal. No respiratory distress.      Breath sounds: Normal breath sounds. No wheezing or rales.   Abdominal:      General: Bowel sounds are normal. There is no distension.      Palpations: Abdomen is soft.      Tenderness: There is no abdominal tenderness.   Musculoskeletal:         General: No tenderness or deformity. Normal range of motion.      Right lower leg: No edema.      Left lower leg: No edema.   Skin:     General: Skin is warm and dry.      Coloration: Skin is not pale.      Findings: No erythema or rash.   Neurological:      Mental Status: She is alert and oriented to person, place, and time. She is not disoriented.          Assessment/Plan:     Diagnoses and all orders for this visit:    1. Exposure to sexually transmitted disease (STD) (Primary)  -     Neisseria Gonorrhea, PCR - Swab, Vagina; Future  -     HIV-1/O/2 ANTIGEN/ANTIBODY, 4TH GENERATION; Future  -     HSV 1 and 2-Specific Ab, IgG; Future  -     RPR; Future  -     Trichomonas vaginalis, PCR - , Cervix; Future    2. Other insomnia  -     QUEtiapine (SEROquel) 25 MG tablet; Take 1 tablet by mouth Every Night.  Dispense: 30 tablet; Refill: 1  -     Ambulatory Referral to Sleep Medicine    1. Will get STD labs as above. Await results and treat as indicated.    2. Previously tried Trazodone and Clonidine. Will try Seroquel; discussed potential side effect of weight gain. Discussed weaning off of Clonidine to avoid withdrawal effects. Titrate Seroquel as needed.    · Rx changes: see a/p  · Patient Education: see a/p  · Compliance at present is estimated to be good.        Follow-up:     Return if symptoms worsen or fail to improve.    Preventative:  Health  Maintenance   Topic Date Due   • ANNUAL PHYSICAL  Never done   • INFLUENZA VACCINE  Never done   • COVID-19 Vaccine (3 - Booster for Pfizer series) 01/31/2022   • PAP SMEAR  10/06/2024   • TDAP/TD VACCINES (2 - Td or Tdap) 11/14/2029   • HEPATITIS C SCREENING  Completed   • Pneumococcal Vaccine 0-64  Aged Out     Female Preventative: UTD  Recommended: Influenza and COVID  Vaccine Counseling: N/A    Weight  -Class: Normal: 18.5-24.9kg/m2  -Patient's Body mass index is 24.55 kg/m². indicating that she is within normal range (BMI 18.5-24.9). No BMI management plan needed..   eat more fruits and vegetables, decrease soda or juice intake, increase water intake, increase physical activity, reduce screen time and reduce portion size    Alcohol use:  reports no history of alcohol use.  Nicotine status  reports that she has never smoked. She has never used smokeless tobacco.     Goals     •  Less migraine (pt-stated)       Multiple stressors (divorce, new job)        •  Less pain       T7 compression fracture              RISK SCORE: 3      Shelton Corrigan M.D. PGY3  [facility]  [address] [place] [address]  Office: [phone]  This document has been electronically signed by Shelton Corrigan MD on March 29, 2022 16:10 CDT

## 2022-03-30 ENCOUNTER — TELEPHONE (OUTPATIENT)
Dept: FAMILY MEDICINE CLINIC | Facility: CLINIC | Age: 27
End: 2022-03-30

## 2022-03-30 LAB
HIV1+2 AB SER QL: NORMAL
RPR SER QL: NORMAL

## 2022-03-30 NOTE — TELEPHONE ENCOUNTER
Pt called back and I told her that she needed to come in and redo lab test. Pt said that was fine she would be in when she could

## 2022-03-30 NOTE — TELEPHONE ENCOUNTER
----- Message from Shelton Corrigan MD sent at 3/30/2022  1:17 PM CDT -----  Regarding: FW: Wrong sample submitted  Can you call patient and ask her to go to the lab to recollect the swab? Lab did the wrong swab. Thanks    ----- Message -----  From: Isabel Gee  Sent: 3/30/2022   6:30 AM CDT  To: Shelton Corrigan MD  Subject: Wrong sample submitted                           Dr. Corrigan,    We received a sample in the lab on the above patient for Gonorrhea, Chlamydia and Trichomonas testing.  The sample collected is used for vaginitis testing (Candida, Gardnerella and Trichomonas), and is incorrect for the test ordered.  The patient can submit a urine sample for the testing requested to avoid having to be swabbed.  For future reference, the proper collection for the ordered test is to use the BD max UVE specimen collection kit.    If you have any questions, please call the microbiology department [phone].    Thank you,  Gina

## 2022-03-31 ENCOUNTER — LAB (OUTPATIENT)
Dept: LAB | Facility: HOSPITAL | Age: 27
End: 2022-03-31

## 2022-03-31 DIAGNOSIS — Z20.2 EXPOSURE TO SEXUALLY TRANSMITTED DISEASE (STD): ICD-10-CM

## 2022-03-31 LAB
CANDIDA ALBICANS: NEGATIVE
GARDNERELLA VAGINALIS: POSITIVE
HSV1 IGG SER IA-ACNC: 27.5 INDEX (ref 0–0.9)
HSV2 IGG SER IA-ACNC: <0.91 INDEX (ref 0–0.9)
T VAGINALIS DNA VAG QL PROBE+SIG AMP: NEGATIVE

## 2022-03-31 PROCEDURE — 87480 CANDIDA DNA DIR PROBE: CPT

## 2022-03-31 PROCEDURE — 87660 TRICHOMONAS VAGIN DIR PROBE: CPT

## 2022-03-31 PROCEDURE — 87510 GARDNER VAG DNA DIR PROBE: CPT

## 2022-03-31 NOTE — PROGRESS NOTES
I have spoken with the patient .     I have reviewed the notes, assessments, and/or procedures performed by Dr. Shelton Corrigan,   I concur with   his  documentation and assessment and plan for Millie Quevedo.          This document has been electronically signed by Frank Abreu MD on March 31, 2022 13:58 CDT

## 2022-04-02 LAB
QT INTERVAL: 388 MS
QTC INTERVAL: 455 MS

## 2022-04-04 ENCOUNTER — OFFICE VISIT (OUTPATIENT)
Dept: SLEEP MEDICINE | Facility: HOSPITAL | Age: 27
End: 2022-04-04

## 2022-04-04 VITALS
HEART RATE: 86 BPM | OXYGEN SATURATION: 98 % | WEIGHT: 177.3 LBS | BODY MASS INDEX: 24.82 KG/M2 | DIASTOLIC BLOOD PRESSURE: 66 MMHG | SYSTOLIC BLOOD PRESSURE: 90 MMHG | HEIGHT: 71 IN

## 2022-04-04 DIAGNOSIS — B96.89 BACTERIAL VAGINOSIS: Primary | ICD-10-CM

## 2022-04-04 DIAGNOSIS — F51.04 PSYCHOPHYSIOLOGICAL INSOMNIA: ICD-10-CM

## 2022-04-04 DIAGNOSIS — G47.8 SLEEP PARALYSIS: ICD-10-CM

## 2022-04-04 DIAGNOSIS — G47.61 PLMD (PERIODIC LIMB MOVEMENT DISORDER): ICD-10-CM

## 2022-04-04 DIAGNOSIS — N76.0 BACTERIAL VAGINOSIS: Primary | ICD-10-CM

## 2022-04-04 DIAGNOSIS — Z72.821 POOR SLEEP HYGIENE: ICD-10-CM

## 2022-04-04 DIAGNOSIS — R53.83 FATIGUE, UNSPECIFIED TYPE: Primary | ICD-10-CM

## 2022-04-04 PROCEDURE — 99215 OFFICE O/P EST HI 40 MIN: CPT | Performed by: NURSE PRACTITIONER

## 2022-04-04 RX ORDER — METRONIDAZOLE 500 MG/1
500 TABLET ORAL 2 TIMES DAILY
Qty: 14 TABLET | Refills: 0 | Status: SHIPPED | OUTPATIENT
Start: 2022-04-04 | End: 2022-04-04

## 2022-04-04 NOTE — PROGRESS NOTES
"New Patient Sleep Medicine Consultation    Encounter Date: 4/4/2022         Patient's PCP: Shelton Corrigan MD  Referring provider: Shelton Corrigan MD  Reason for consultation chief complaint: unrefreshing sleep and insomnia, fatigue     Millie Quevedo is a 26 y.o. female whose bedtime is ~ 2200. She  falls asleep after 20 minutes with clonidine, and is up 4-5 times per night.Tossing and turning. Not able to fall back asleep quickly.  She wakes up ~ 3811-1175.Keeps same sleep schedule on weekend. She endorses 4-5 hours of sleep. If she does not take medicine it takes hours to fall asleep. She drinks 0 cups of coffee, 0 teas, and 2-3 sodas per day. She drinks 0 alcoholic beverages per week.      Millie Quevedo admits to unrestful sleep, High blod pressure, decreased libido, morning headaches, irritability, sleeping less than 6 hours per night, Disturbed or restless sleep, night sweats, teeth grinding, restless legs at night, difficulty falling asleep, difficulty staying asleep and takes medicine to help go to sleep for >5 years. She denies cataplexy, or hypnagogic hallucinations.Has had two isolated episodes of sleep paralysis. Was awake and not able to move body. States did not last long. Remembers episode. States was under a great deal of stress when they happened.   She takes clonidine for sleep.  Has been taking for ~ 8 months. Still helps with sleep onset but does not help as much with sleep maintenance lately.  Has also tried trazodone that did not help. As well as Benadryl and Elavil.  Recently prescribed Seroquel 25 but has not started taking. She sleeps with room dark and with sound machine. Mind does not shut down when she gets in bed. States she never really feels \"sleepy\" more so just fatigued.  She has no sleepiness with driving. She naps  No days. She has never had a sleep study. She sleeps in a bed alone now since having trouble sleeping  will not sleep in the bed with her. "  has also told her that her legs jerk and kick at night. Denies sleep talking, sleep walking or acting out dreams.     She is a never smoker.     Marital status:    Occupation: stay at home mom   Children: 1  FH of sleep disorders: mother has narcolepsy       Past Medical History:   Diagnosis Date   • Abnormal Pap smear of cervix    • Acute maxillary sinusitis    • Acute otitis externa    • Acute pharyngitis    • Acute tonsillitis    • Allergic asthma     IgE-mediated allergic asthma     • Allergic rhinitis    • Allergic rhinitis due to pollen    • Anxiety    • Asthma    • Chondromalacia of patella     left knee    • Closed wedge compression fracture of T7 vertebra with routine healing 8/11/2020   • Common cold    • Cough    • Diarrhea    • Disorder of gallbladder     Probable biliary dyskinesia    • Epigastric pain    • Foot pain    • Gastroenteritis    • Generalized abdominal pain    • Headache    • History of colonoscopy 03/27/2013    Colon endoscopy 12129 (1)  -  Internal & external hemorrhoids found.   • History of esophagogastroduodenoscopy (EGD) 03/27/2013    w/ tube 55880 (1)  -  Normal esophagus. Gastritis in stomach. Biopsy taken. Normal duodenum. Biospy taken   • History of marijuana use    • HPV (human papilloma virus) infection    • IBS (irritable bowel syndrome)    • Influenza    • Irregular periods    • Irritable bowel syndrome    • Migraine    • Nausea    • Nausea and vomiting    • Osgood-Schlatter's disease    • Pain in throat    • Patellar tendonitis    • Right upper quadrant pain    • Streptococcal sore throat    • Temporomandibular joint disorder     WISDOM TEETH    • Upper respiratory infection    • Urgency of urination    • Urinary tract infectious disease    • Varicella    • Viral gastroenteritis    • Vulvovaginitis      Social History     Socioeconomic History   • Marital status:    Tobacco Use   • Smoking status: Never Smoker   • Smokeless tobacco: Never Used   Substance  "and Sexual Activity   • Alcohol use: No   • Drug use: Yes     Types: Marijuana     Comment: stopped with positive pregnancy test    • Sexual activity: Yes     Partners: Male     Birth control/protection: None     Comment: last pap smear 5/17/19 ASCUS      Family History   Adopted: Yes   Problem Relation Age of Onset   • Cancer Other    • Diabetes Other    • Heart disease Other    • Hypertension Other    • Stroke Other    • Lung disease Other    • Cholelithiasis Other    • Ulcers Other    • Other Other         Colon problems   • Ovarian cysts Mother    • Bipolar disorder Mother    • Irritable bowel syndrome Mother    • Ovarian cancer Mother    • No Known Problems Sister    • Emphysema Maternal Grandmother    • Heart attack Maternal Grandfather    • No Known Problems Sister    • No Known Problems Sister          Review of Systems:  Constitutional: negative  Eyes: negative  Ears, nose, mouth, throat, and face: negative  Respiratory: negative  Cardiovascular: negative  Gastrointestinal: negative  Genitourinary:negative  Integument/breast: negative  Hematologic/lymphatic: negative  Musculoskeletal:negative  Neurological: negative  Behavioral/Psych: negative  Endocrine: negative  Allergic/Immunologic: negative Patient advised to discuss any positive ROS with PCP.      [place] - 2      Vitals:    04/04/22 1405   BP: 90/66   Pulse: 86   SpO2: 98%           04/04/22  1405   Weight: 80.4 kg (177 lb 4.8 oz)       Body mass index is 24.74 kg/m². Patient's Body mass index is 24.74 kg/m². indicating that she is within normal range (BMI 18.5-24.9). No BMI management plan needed..      Neck circumference: 13\"          General: Alert. Cooperative. Well developed. No acute distress.             Head:  Normocephalic. Symmetrical. Atraumatic.              Eyes: Sclera clear. No icterus. Normal EOM.             Ears: No deformities. Normal hearing.             Nose: No septal deviation. No drainage.          Throat: No oral lesions. No " thrush. Moist mucous membranes. Trachea midline    Tongue is normal    Dentition is fair       Pharynx: Posterior pharyngeal pillars are narrow    Mallampati score of III (soft and hard palate and base of uvula visible)    Pharynx is nonerythematous   Chest Wall:  Normal shape. Symmetric expansion with respiration. No tenderness.          Lungs:  Clear to auscultation bilaterally. No wheezes. No rhonchi. No rales. Respirations regular, even and unlabored.            Heart:  Regular rhythm and normal rate. Normal S1 and S2. No murmur.  Extremities:  Moves all extremities well. No edema.               Skin: Dry. Intact. No bleeding. No rash.           Neuro: Moves all 4 extremities and cranial nerves grossly intact.  Psychiatric: Normal mood and affect.      Current Outpatient Medications:   •  albuterol sulfate HFA (Ventolin HFA) 108 (90 Base) MCG/ACT inhaler, Inhale 2 puffs Every 4 (Four) Hours As Needed for Wheezing., Disp: 6.7 g, Rfl: 0  •  cloNIDine (Catapres) 0.3 MG tablet, Take 1 tablet by mouth 2 (Two) Times a Day., Disp: 60 tablet, Rfl: 0  •  promethazine (PHENERGAN) 25 MG tablet, Take 1 tablet by mouth Every 6 (Six) Hours As Needed for Nausea or Vomiting., Disp: 30 tablet, Rfl: 0  •  QUEtiapine (SEROquel) 25 MG tablet, Take 1 tablet by mouth Every Night., Disp: 30 tablet, Rfl: 1    Lab Results   Component Value Date    WBC 12.54 (H) 03/12/2022    HGB 13.6 03/12/2022    HCT 39.2 03/12/2022    MCV 87.3 03/12/2022     03/12/2022     Lab Results   Component Value Date    GLUCOSE 143 (H) 03/12/2022    CALCIUM 9.3 03/12/2022     03/12/2022    K 3.2 (L) 03/12/2022    CO2 20.0 (L) 03/12/2022     03/12/2022    BUN 11 03/12/2022    CREATININE 0.75 03/12/2022    EGFRIFNONA 103 04/16/2021    BCR 14.7 03/12/2022    ANIONGAP 14.0 03/12/2022     Lab Results   Component Value Date    INR 0.97 09/21/2018    PROTIME 12.7 09/21/2018     Lab Results   Component Value Date    CKTOTAL 34 09/21/2018    CKMB  <0.22 09/21/2018    TROPONINI <0.012 09/21/2018       No results found for: PHART, JCT0ALB, PO2ART]    Contraindications to home sleep test: Sleep related movement disorder like periodic limb movement disorder and none    Assessment and Plan:    1. Daytime sleepiness, fatigue- New (to me), additional work-up planned (4)  1. Check PSG  2. Good sleep hygiene   3. Drowsy driving tips- do not drive if feeling sleepy   4. RTC in 2 weeks with study results   3.   Restless leg syndrome/PLMD New (to me), additional work-up planned (4)  1. Other symptoms / clinical states include Use of antidepressant or antipsychotic medication   2. Check PSG   3. Non-pharmacological treatment methods   4.   Insomnia - sleep onset and or maintenance New (to me), additional work-up planned (4)    1.   Check PSG    2.   Actigraphy with sleep diary for 2 weeks    3.   Limit caffeine to 2 drinks per day no later than 2PM   4.   Sleep restriction- Limit time in bed to 8320-0232   5.   Stimulus control therapy discussed    6.   Recommended CBT-I ap for phone    7.   Start Seroquel 25 mg nightly   5.   Poor sleep hygiene   6. Sleep paralysis    1.  Likely stress induced       I obtained a brief history from the patient, reviewed the medical problems and current medications, and made medical decisions regarding treatment based on that information.   I spent 47 minutes caring for Mlilie on this date of service. This time includes time spent by me in the following activities: preparing for the visit, obtaining and/or reviewing a separately obtained history, performing a medically appropriate examination and/or evaluation, counseling and educating the patient/family/caregiver, ordering medications, tests, or procedures and documenting information in the medical record. I answered all of her questions and she verbalized understanding. Discussion involved sleep hygiene, behavioral therapies for insomnia, actigraphy testing, PSG, and follow-up.            RTC 2 weeks after study for results.             This document has been electronically signed by NISHI Fajardo on April 4, 2022 14:09 CDT          This document has been electronically signed by NISHI Fajardo on April 4, 2022         CC: Shelton Corrigan MD Mohammed, Hassan, MD

## 2022-04-12 ENCOUNTER — OFFICE VISIT (OUTPATIENT)
Dept: FAMILY MEDICINE CLINIC | Facility: CLINIC | Age: 27
End: 2022-04-12

## 2022-04-12 VITALS
BODY MASS INDEX: 24.96 KG/M2 | HEIGHT: 71 IN | DIASTOLIC BLOOD PRESSURE: 76 MMHG | TEMPERATURE: 98.2 F | HEART RATE: 80 BPM | OXYGEN SATURATION: 98 % | WEIGHT: 178.3 LBS | SYSTOLIC BLOOD PRESSURE: 118 MMHG

## 2022-04-12 DIAGNOSIS — G47.09 OTHER INSOMNIA: ICD-10-CM

## 2022-04-12 DIAGNOSIS — F41.1 GENERALIZED ANXIETY DISORDER: Primary | ICD-10-CM

## 2022-04-12 DIAGNOSIS — F98.8 ATTENTION DEFICIT DISORDER (ADD) WITHOUT HYPERACTIVITY: ICD-10-CM

## 2022-04-12 PROCEDURE — 99213 OFFICE O/P EST LOW 20 MIN: CPT | Performed by: STUDENT IN AN ORGANIZED HEALTH CARE EDUCATION/TRAINING PROGRAM

## 2022-04-12 RX ORDER — CLONIDINE HYDROCHLORIDE 0.3 MG/1
0.3 TABLET ORAL NIGHTLY
Qty: 3 TABLET | Refills: 0 | Status: SHIPPED | OUTPATIENT
Start: 2022-04-12 | End: 2022-05-17

## 2022-04-12 RX ORDER — ATOMOXETINE 40 MG/1
CAPSULE ORAL
Qty: 46 CAPSULE | Refills: 0 | Status: SHIPPED | OUTPATIENT
Start: 2022-04-12 | End: 2022-04-13 | Stop reason: SDUPTHER

## 2022-04-12 RX ORDER — CLONIDINE HYDROCHLORIDE 0.1 MG/1
TABLET ORAL
Qty: 9 TABLET | Refills: 0 | Status: SHIPPED | OUTPATIENT
Start: 2022-04-15 | End: 2022-05-17

## 2022-04-12 NOTE — PATIENT INSTRUCTIONS
Clondine 0.3 x 3 days then 0.2 x 3 days then 0.1 x 3 days.  Start Seroquel today.  Start Straterra today and increase dose in 14 days  We will start SSRI in 1 month

## 2022-04-13 DIAGNOSIS — F90.2 ADHD (ATTENTION DEFICIT HYPERACTIVITY DISORDER), COMBINED TYPE: Primary | ICD-10-CM

## 2022-04-13 RX ORDER — ATOMOXETINE 80 MG/1
80 CAPSULE ORAL DAILY
Qty: 16 CAPSULE | Refills: 0 | Status: SHIPPED | OUTPATIENT
Start: 2022-04-27 | End: 2022-05-17 | Stop reason: SDUPTHER

## 2022-04-13 RX ORDER — ATOMOXETINE 40 MG/1
40 CAPSULE ORAL DAILY
Qty: 14 CAPSULE | Refills: 0 | Status: SHIPPED | OUTPATIENT
Start: 2022-04-13 | End: 2022-05-17

## 2022-04-18 PROBLEM — F41.1 GENERALIZED ANXIETY DISORDER: Status: ACTIVE | Noted: 2022-04-18

## 2022-04-18 PROBLEM — F98.8 ATTENTION DEFICIT DISORDER (ADD) WITHOUT HYPERACTIVITY: Status: ACTIVE | Noted: 2022-04-18

## 2022-04-18 NOTE — PROGRESS NOTES
I have spoken with the patient .     I have reviewed the notes, assessments, and/or procedures performed by Dr. Shelton Corrigan,   I concur with   his  documentation and assessment and plan for Millie Quevedo.          This document has been electronically signed by Frank Abreu MD on April 18, 2022 14:31 CDT

## 2022-04-18 NOTE — PROGRESS NOTES
Family Medicine Residency  Shelton Corrigan MD    Subjective:     Millie Quevedo is a 26 y.o. female who presents for anxiety and ADD. She was seen by Dr. Shaw and diagnosed with ADD and MORENO. She has previously expressed feeling as if she has had ADD since childhood but never diagnosed. She is now interested in medication given the interference with work and ADLs. Her anxiety has worsened over the last several months. She describes being easily agitated, irritable, anxious about everything, and insomnia. Denies any manic symptoms or panic attacks. She has had issues with sleeping since her teenage years and has a sleep study scheduled end of the month.    The following portions of the patient's history were reviewed and updated as appropriate: allergies, current medications, past family history, past medical history, past social history, past surgical history and problem list.    Past Medical Hx:  Past Medical History:   Diagnosis Date   • Abnormal Pap smear of cervix    • Acute maxillary sinusitis    • Acute otitis externa    • Acute pharyngitis    • Acute tonsillitis    • Allergic asthma     IgE-mediated allergic asthma     • Allergic rhinitis    • Allergic rhinitis due to pollen    • Anxiety    • Asthma    • Chondromalacia of patella     left knee    • Closed wedge compression fracture of T7 vertebra with routine healing 8/11/2020   • Common cold    • Cough    • Diarrhea    • Disorder of gallbladder     Probable biliary dyskinesia    • Epigastric pain    • Foot pain    • Gastroenteritis    • Generalized abdominal pain    • Headache    • History of colonoscopy 03/27/2013    Colon endoscopy 37755 (1)  -  Internal & external hemorrhoids found.   • History of esophagogastroduodenoscopy (EGD) 03/27/2013    w/ tube 57638 (1)  -  Normal esophagus. Gastritis in stomach. Biopsy taken. Normal duodenum. Biospy taken   • History of marijuana use    • HPV (human papilloma virus) infection    • IBS (irritable bowel  syndrome)    • Influenza    • Irregular periods    • Irritable bowel syndrome    • Migraine    • Nausea    • Nausea and vomiting    • Osgood-Schlatter's disease    • Pain in throat    • Patellar tendonitis    • Right upper quadrant pain    • Streptococcal sore throat    • Temporomandibular joint disorder     WISDOM TEETH    • Upper respiratory infection    • Urgency of urination    • Urinary tract infectious disease    • Varicella    • Viral gastroenteritis    • Vulvovaginitis        Past Surgical Hx:  Past Surgical History:   Procedure Laterality Date   • CHOLECYSTECTOMY  06/06/2013    Laparoscopic  -  laparoscopic cholecystectomy with intraoperative cholangiogram. Cholecystitis.   • INJECTION OF MEDICATION  01/08/2013    Toradol (1)   -  FLORY Sotomayor   • LAPAROSCOPIC CHOLECYSTECTOMY     • WISDOM TOOTH EXTRACTION         Current Meds:    Current Outpatient Medications:   •  cloNIDine (Catapres) 0.3 MG tablet, Take 1 tablet by mouth Every Night., Disp: 3 tablet, Rfl: 0  •  albuterol sulfate HFA (Ventolin HFA) 108 (90 Base) MCG/ACT inhaler, Inhale 2 puffs Every 4 (Four) Hours As Needed for Wheezing., Disp: 6.7 g, Rfl: 0  •  atomoxetine (Strattera) 40 MG capsule, Take 1 capsule by mouth Daily., Disp: 14 capsule, Rfl: 0  •  [START ON 4/27/2022] atomoxetine (Strattera) 80 MG capsule, Take 1 capsule by mouth Daily., Disp: 16 capsule, Rfl: 0  •  cloNIDine (Catapres) 0.1 MG tablet, Take 2 tablets by mouth Every Night for 3 days, THEN 1 tablet Every Night for 3 days., Disp: 9 tablet, Rfl: 0  •  promethazine (PHENERGAN) 25 MG tablet, Take 1 tablet by mouth Every 6 (Six) Hours As Needed for Nausea or Vomiting., Disp: 30 tablet, Rfl: 0  •  QUEtiapine (SEROquel) 25 MG tablet, Take 1 tablet by mouth Every Night., Disp: 30 tablet, Rfl: 1    Allergies:  Allergies   Allergen Reactions   • Aspirin Shortness Of Breath     TIGHT CHEST   • Erythromycin Base Rash   • Latex Rash     Rash        Family Hx:  Family History   Adopted: Yes    Problem Relation Age of Onset   • Cancer Other    • Diabetes Other    • Heart disease Other    • Hypertension Other    • Stroke Other    • Lung disease Other    • Cholelithiasis Other    • Ulcers Other    • Other Other         Colon problems   • Ovarian cysts Mother    • Bipolar disorder Mother    • Irritable bowel syndrome Mother    • Ovarian cancer Mother    • No Known Problems Sister    • Emphysema Maternal Grandmother    • Heart attack Maternal Grandfather    • No Known Problems Sister    • No Known Problems Sister         Social History:  Social History     Socioeconomic History   • Marital status:    Tobacco Use   • Smoking status: Never Smoker   • Smokeless tobacco: Never Used   Substance and Sexual Activity   • Alcohol use: No   • Drug use: Yes     Types: Marijuana     Comment: stopped with positive pregnancy test    • Sexual activity: Yes     Partners: Male     Birth control/protection: None     Comment: last pap smear 5/17/19 ASCUS        Review of Systems  Review of Systems   Constitutional: Negative for activity change, appetite change, fatigue and fever.   HENT: Negative for congestion, rhinorrhea, sinus pain and sore throat.    Eyes: Negative for pain, redness and visual disturbance.   Respiratory: Negative for cough, shortness of breath and wheezing.    Cardiovascular: Negative for chest pain, palpitations and leg swelling.   Gastrointestinal: Negative for abdominal pain, constipation, diarrhea, nausea and vomiting.   Genitourinary: Negative for dysuria and flank pain.   Musculoskeletal: Negative for arthralgias, back pain, joint swelling and myalgias.   Skin: Negative for color change, pallor and rash.   Neurological: Negative for dizziness, light-headedness and headaches.   Psychiatric/Behavioral: Positive for decreased concentration, dysphoric mood and sleep disturbance. Negative for self-injury and suicidal ideas. The patient is nervous/anxious.        Objective:     /76   Pulse 80  "  Temp 98.2 °F (36.8 °C)   Ht 180.3 cm (71\")   Wt 80.9 kg (178 lb 4.8 oz)   SpO2 98%   BMI 24.87 kg/m²   Physical Exam  Vitals and nursing note reviewed.   Constitutional:       General: She is not in acute distress.     Appearance: Normal appearance. She is well-developed. She is not diaphoretic.   HENT:      Head: Normocephalic and atraumatic.      Right Ear: Hearing normal.      Left Ear: Hearing normal.   Eyes:      General: Lids are normal.      Conjunctiva/sclera: Conjunctivae normal.   Cardiovascular:      Rate and Rhythm: Normal rate and regular rhythm.      Heart sounds: Normal heart sounds.   Pulmonary:      Effort: Pulmonary effort is normal. No respiratory distress.      Breath sounds: Normal breath sounds. No wheezing or rales.   Abdominal:      General: Bowel sounds are normal. There is no distension.      Palpations: Abdomen is soft.      Tenderness: There is no abdominal tenderness.   Musculoskeletal:         General: No tenderness or deformity. Normal range of motion.      Right lower leg: No edema.      Left lower leg: No edema.   Skin:     General: Skin is warm and dry.      Coloration: Skin is not pale.      Findings: No erythema or rash.   Neurological:      Mental Status: She is alert and oriented to person, place, and time. She is not disoriented.          Assessment/Plan:     Diagnoses and all orders for this visit:    1. Generalized anxiety disorder (Primary)  -     Vitamin D 25 hydroxy; Future    2. Attention deficit disorder (ADD) without hyperactivity  -     Discontinue: atomoxetine (Strattera) 40 MG capsule; Take 1 capsule by mouth Daily for 14 days, THEN 2 capsules Daily for 16 days.  Dispense: 46 capsule; Refill: 0    3. Other insomnia  -     cloNIDine (Catapres) 0.1 MG tablet; Take 2 tablets by mouth Every Night for 3 days, THEN 1 tablet Every Night for 3 days.  Dispense: 9 tablet; Refill: 0  -     cloNIDine (Catapres) 0.3 MG tablet; Take 1 tablet by mouth Every Night.  Dispense: " 3 tablet; Refill: 0    Will start Strattera for ADD symptoms. Strattera 40mg x 14 days followed by 80mg daily. Continue counseling with Dr. Shaw. Check vitamin D level. PHQ 15, MORENO 18.Would benefit from SSRI but will make that addition at a later visit due to multiple medical changes being made. Suspect insomnia is related to ADD and MORENO. She will have a sleep study done end of the month. Wean off Clonidine 0.3mg x 3 days then 0.2mg x 3 days then 0.1mg x 3 days. Can start Seroquel 25mg today.    · Rx changes: see a/p  · Patient Education: see a/p  · Compliance at present is estimated to be good.       Depression screening: Up to date; last screen 4/12/2022     Follow-up:     Return in about 4 weeks (around 5/10/2022) for Recheck.    Preventative:  Health Maintenance   Topic Date Due   • ANNUAL PHYSICAL  Never done   • COVID-19 Vaccine (3 - Booster for Pfizer series) 01/31/2022   • INFLUENZA VACCINE  08/01/2022   • PAP SMEAR  10/06/2024   • TDAP/TD VACCINES (2 - Td or Tdap) 11/14/2029   • HEPATITIS C SCREENING  Completed   • Pneumococcal Vaccine 0-64  Aged Out     Female Preventative: physical  Recommended: COVID  Vaccine Counseling: N/A    Weight  -Class: Normal: 18.5-24.9kg/m2  -Patient's Body mass index is 24.87 kg/m². indicating that she is within normal range (BMI 18.5-24.9). No BMI management plan needed..   eat more fruits and vegetables, decrease soda or juice intake, increase water intake, increase physical activity, reduce screen time and reduce portion size    Alcohol use:  reports no history of alcohol use.  Nicotine status  reports that she has never smoked. She has never used smokeless tobacco.     Goals     •  Less migraine (pt-stated)       Multiple stressors (divorce, new job)        •  Less pain       T7 compression fracture              RISK SCORE: 3      Shelton Corrigan M.D. PGY3  [facility]  [address] [place] [address]  Office:  [phone]  This document has been electronically signed by Shelton Corrigan MD on April 18, 2022 10:27 CDT

## 2022-04-19 ENCOUNTER — HOSPITAL ENCOUNTER (OUTPATIENT)
Dept: SLEEP MEDICINE | Facility: HOSPITAL | Age: 27
Discharge: HOME | End: 2022-04-19
Admitting: NURSE PRACTITIONER

## 2022-04-19 DIAGNOSIS — F51.04 PSYCHOPHYSIOLOGICAL INSOMNIA: ICD-10-CM

## 2022-04-19 PROCEDURE — 95803 ACTIGRAPHY TESTING: CPT | Performed by: PSYCHIATRY & NEUROLOGY

## 2022-04-19 PROCEDURE — 95803 ACTIGRAPHY TESTING: CPT

## 2022-04-25 ENCOUNTER — TELEPHONE (OUTPATIENT)
Dept: SLEEP MEDICINE | Facility: HOSPITAL | Age: 27
End: 2022-04-25

## 2022-05-03 ENCOUNTER — HOSPITAL ENCOUNTER (OUTPATIENT)
Dept: SLEEP MEDICINE | Facility: HOSPITAL | Age: 27
Discharge: HOME | End: 2022-05-03
Admitting: NURSE PRACTITIONER

## 2022-05-03 DIAGNOSIS — G47.8 SLEEP PARALYSIS: ICD-10-CM

## 2022-05-03 DIAGNOSIS — G47.61 PLMD (PERIODIC LIMB MOVEMENT DISORDER): ICD-10-CM

## 2022-05-03 DIAGNOSIS — Z72.821 POOR SLEEP HYGIENE: ICD-10-CM

## 2022-05-03 DIAGNOSIS — R53.83 FATIGUE, UNSPECIFIED TYPE: ICD-10-CM

## 2022-05-03 DIAGNOSIS — F51.04 PSYCHOPHYSIOLOGICAL INSOMNIA: ICD-10-CM

## 2022-05-03 DIAGNOSIS — G47.09 OTHER INSOMNIA: ICD-10-CM

## 2022-05-03 PROCEDURE — 95810 POLYSOM 6/> YRS 4/> PARAM: CPT | Performed by: PSYCHIATRY & NEUROLOGY

## 2022-05-03 PROCEDURE — 95810 POLYSOM 6/> YRS 4/> PARAM: CPT

## 2022-05-03 RX ORDER — QUETIAPINE FUMARATE 50 MG/1
50 TABLET, FILM COATED ORAL NIGHTLY
Qty: 30 TABLET | Refills: 1 | Status: SHIPPED | OUTPATIENT
Start: 2022-05-03 | End: 2022-05-06 | Stop reason: SDUPTHER

## 2022-05-06 DIAGNOSIS — G47.09 OTHER INSOMNIA: ICD-10-CM

## 2022-05-06 RX ORDER — QUETIAPINE FUMARATE 50 MG/1
50 TABLET, FILM COATED ORAL NIGHTLY
Qty: 30 TABLET | Refills: 1 | Status: SHIPPED | OUTPATIENT
Start: 2022-05-06 | End: 2022-05-17 | Stop reason: SDUPTHER

## 2022-05-17 ENCOUNTER — OFFICE VISIT (OUTPATIENT)
Dept: SLEEP MEDICINE | Facility: HOSPITAL | Age: 27
End: 2022-05-17

## 2022-05-17 ENCOUNTER — OFFICE VISIT (OUTPATIENT)
Dept: FAMILY MEDICINE CLINIC | Facility: CLINIC | Age: 27
End: 2022-05-17

## 2022-05-17 VITALS
HEART RATE: 95 BPM | SYSTOLIC BLOOD PRESSURE: 113 MMHG | HEIGHT: 71 IN | WEIGHT: 181 LBS | OXYGEN SATURATION: 98 % | BODY MASS INDEX: 25.34 KG/M2 | DIASTOLIC BLOOD PRESSURE: 66 MMHG

## 2022-05-17 VITALS
SYSTOLIC BLOOD PRESSURE: 118 MMHG | HEIGHT: 70 IN | OXYGEN SATURATION: 98 % | BODY MASS INDEX: 25.91 KG/M2 | WEIGHT: 181 LBS | DIASTOLIC BLOOD PRESSURE: 76 MMHG | HEART RATE: 93 BPM

## 2022-05-17 DIAGNOSIS — F98.8 ATTENTION DEFICIT DISORDER (ADD) WITHOUT HYPERACTIVITY: ICD-10-CM

## 2022-05-17 DIAGNOSIS — J02.9 SORE THROAT: ICD-10-CM

## 2022-05-17 DIAGNOSIS — F51.04 PSYCHOPHYSIOLOGICAL INSOMNIA: Primary | ICD-10-CM

## 2022-05-17 DIAGNOSIS — J45.20 MILD INTERMITTENT EXTRINSIC ASTHMA WITHOUT COMPLICATION: ICD-10-CM

## 2022-05-17 DIAGNOSIS — J02.9 PHARYNGITIS, UNSPECIFIED ETIOLOGY: Primary | ICD-10-CM

## 2022-05-17 DIAGNOSIS — F41.1 GENERALIZED ANXIETY DISORDER: ICD-10-CM

## 2022-05-17 DIAGNOSIS — J03.91 RECURRENT TONSILLITIS: ICD-10-CM

## 2022-05-17 DIAGNOSIS — G47.09 OTHER INSOMNIA: ICD-10-CM

## 2022-05-17 LAB
EXPIRATION DATE: NORMAL
INTERNAL CONTROL: NORMAL
Lab: NORMAL
S PYO AG THROAT QL: NEGATIVE

## 2022-05-17 PROCEDURE — 99213 OFFICE O/P EST LOW 20 MIN: CPT | Performed by: NURSE PRACTITIONER

## 2022-05-17 PROCEDURE — 87880 STREP A ASSAY W/OPTIC: CPT | Performed by: STUDENT IN AN ORGANIZED HEALTH CARE EDUCATION/TRAINING PROGRAM

## 2022-05-17 PROCEDURE — 99213 OFFICE O/P EST LOW 20 MIN: CPT | Performed by: STUDENT IN AN ORGANIZED HEALTH CARE EDUCATION/TRAINING PROGRAM

## 2022-05-17 RX ORDER — QUETIAPINE FUMARATE 100 MG/1
100 TABLET, FILM COATED ORAL NIGHTLY
Qty: 30 TABLET | Refills: 1 | Status: SHIPPED | OUTPATIENT
Start: 2022-05-17 | End: 2022-05-31 | Stop reason: SDUPTHER

## 2022-05-17 RX ORDER — ESCITALOPRAM OXALATE 10 MG/1
10 TABLET ORAL DAILY
Qty: 30 TABLET | Refills: 0 | Status: SHIPPED | OUTPATIENT
Start: 2022-05-17 | End: 2022-06-13 | Stop reason: SDUPTHER

## 2022-05-17 RX ORDER — FLUTICASONE PROPIONATE 50 MCG
2 SPRAY, SUSPENSION (ML) NASAL DAILY
Qty: 18.2 ML | Refills: 0 | Status: SHIPPED | OUTPATIENT
Start: 2022-05-17 | End: 2022-06-08

## 2022-05-17 RX ORDER — ALBUTEROL SULFATE 90 UG/1
2 AEROSOL, METERED RESPIRATORY (INHALATION) EVERY 4 HOURS PRN
Qty: 6.7 G | Refills: 0 | Status: SHIPPED | OUTPATIENT
Start: 2022-05-17 | End: 2022-06-13

## 2022-05-17 RX ORDER — ATOMOXETINE 80 MG/1
80 CAPSULE ORAL DAILY
Qty: 30 CAPSULE | Refills: 0 | Status: SHIPPED | OUTPATIENT
Start: 2022-05-17 | End: 2022-07-05 | Stop reason: SDUPTHER

## 2022-05-17 RX ORDER — DEXTROMETHORPHAN POLISTIREX 30 MG/5ML
60 SUSPENSION ORAL EVERY 12 HOURS SCHEDULED
Qty: 280 ML | Refills: 0 | Status: SHIPPED | OUTPATIENT
Start: 2022-05-17 | End: 2022-07-19

## 2022-05-17 NOTE — PROGRESS NOTES
Sleep Clinic Follow-Up    CHIEF COMPLAINT: follow up sleep testing    LAST OV: 04/04/2022 (I reviewed this note)    HPI:  The patient is a 26 y.o. female.  I discussed the results of the recent diagnostic polysomnography performed on 05/03/2022. Sleep efficiency of 87%.  AHI of 0.8. Mean O2 was 95% with a jes of 90%. PLMI of no significance. EKG showed normal sinus rhythm.     Wore actigraphy watch from 04/19/2022 until 05/03/2022. Bed time range 2144 to 0109. Wake time range 0712 to 1129. Average total sleep time 7 hrs 18 minutes. Average sleep latency 44 minutes. During this time she was undergoing Seroquel dosage adjustment and also started on Strattera.       INTERVAL MEDICAL HISTORY: Since last OV she started on Seroquel 25mg and has increased to 50mg. She states is helping her to fall asleep and is not having adverse medicine effects. She has follow-up with Dr. Corrigan today regarding medicine.      She also has started Strattera and is doing well with medicine. Overall she feels better than she did back in April. Sleeping better.     Sleep routine improved since last OV:  Bed time: 2503-7908  Sleep latency: 30 minutes  Number of times awakens during the night: 2-3  Wake time: 0900  Estimated total sleep time at night: 6-7 hours  Caffeine intake: 2-3 soda   Alcohol intake: 0  Nap time: denies    Sleepiness with Driving: denies       MEDICATIONS:   Current Outpatient Medications:   •  albuterol sulfate HFA (Ventolin HFA) 108 (90 Base) MCG/ACT inhaler, Inhale 2 puffs Every 4 (Four) Hours As Needed for Wheezing., Disp: 6.7 g, Rfl: 0  •  atomoxetine (Strattera) 40 MG capsule, Take 1 capsule by mouth Daily., Disp: 14 capsule, Rfl: 0  •  atomoxetine (Strattera) 80 MG capsule, Take 1 capsule by mouth Daily., Disp: 16 capsule, Rfl: 0  •  QUEtiapine (SEROquel) 50 MG tablet, Take 1 tablet by mouth Every Night., Disp: 30 tablet, Rfl: 1    PHYSICAL EXAM  Vitals:    05/17/22 1010   BP: 113/66   Pulse: 95   SpO2: 98%            05/17/22  1010   Weight: 82.1 kg (181 lb)       Body mass index is 25.26 kg/m². BMI is within normal parameters. No other follow-up for BMI required.      Gen:  No acute distress heard in voice, alert, oriented  Eyes:    Moist conjunctiva, EOMI   Lungs:  Normal effort, non-labored breathing, clear to auscultation bilaterally   Heart:  Normal S1/S2, no murmur , no lower extremity edema   Psych:  Appropriate affect   Neuro:  Cn2-12 appear intact     Assessment and Plan:      2. Restless leg syndrome/PLMD, self limiting or minor problem   1. Non-pharmacological treatment methods   3. Insomnia - sleep onset and or maintenance   1. Managed by PCP on Seroquel 50 mg nightly, improved since last OV  2. Good sleep hygiene   4. Sleep paralysis   1. Has not happened since last OV  2. Continue stress reduction, reduce caffeine, relaxation prior to bed     Follow-up on as needed basis.     The sleep results were discussed in detail with the patient. PSG not diagnostic of sleep apnea. . I counseled patient on sleep hygiene, including regular sleep wake schedule and stimulus control therapy, the importance of safe driving and abstaining from smoking and alcohol consumption, as well as other sedatives.       All of the patient's questions were answered. She states understanding and agreement with my assessment and plan as above.     I obtained a brief history from the patient, reviewed the medical problems and current medications, and made medical decisions regarding treatment based on that information. Total visit time 20 min, with total of 12 minutes spent counseling patient regarding: Lifestyle modifications, Sleep hygiene, Medication changes and Study results.       RTC on as needed basis   She agrees to return sooner on a prn basis if sx change.           This document has been electronically signed by NISHI Fajardo on May 17, 2022 10:12 CDT            CC: Shelton Corrigan MD          No ref. provider found

## 2022-05-23 NOTE — PROGRESS NOTES
I have seen the patient.  I have reviewed the notes, assessments, and/or procedures performed by Shelton Corrigan MD, I concur with her/his documentation and assessment and plan for Millie Quevedo.               This document has been electronically signed by Master Johnson MD on May 23, 2022 10:12 CDT

## 2022-05-23 NOTE — PROGRESS NOTES
Family Medicine Residency  Shelton Corrigan MD    Subjective:     Millie Quevedo is a 26 y.o. female who presents for follow-up of insomnia, sore throat, and ADD.    Currently on Straterra 40mg for Straterra. She was supposed to titrate up to the 80mg but was unable to pick it up from the pharmacy. Does report improvement in focus and is more motivated. Feel as if the medication wears off quickly though. She continues to have anxiety symptoms including excessive worrying, agitation, and irritability. Currently taking Seroquel 50mg for insomnia which is working well. She denies grogginess or any adverse effects. She had sleep study done which did not show KESHIA.    Patient reports sore throat, congestion, cough, and post-nasal drip for the past week. She was seen at urgent care on 5/13 and prescribes Cefzil x 10 days. Denies fever or chills. Reports symptoms are improving. She reports having recurrent infections as a child and still gets 4-5 infections annually requiring antibiotics.    The following portions of the patient's history were reviewed and updated as appropriate: allergies, current medications, past family history, past medical history, past social history, past surgical history and problem list.    Past Medical Hx:  Past Medical History:   Diagnosis Date   • Abnormal Pap smear of cervix    • Acute maxillary sinusitis    • Acute otitis externa    • Acute pharyngitis    • Acute tonsillitis    • Allergic asthma     IgE-mediated allergic asthma     • Allergic rhinitis    • Allergic rhinitis due to pollen    • Anxiety    • Asthma    • Chondromalacia of patella     left knee    • Closed wedge compression fracture of T7 vertebra with routine healing 8/11/2020   • Common cold    • Cough    • Diarrhea    • Disorder of gallbladder     Probable biliary dyskinesia    • Epigastric pain    • Foot pain    • Gastroenteritis    • Generalized abdominal pain    • Headache    • History of colonoscopy 03/27/2013     Colon endoscopy 43528 (1)  -  Internal & external hemorrhoids found.   • History of esophagogastroduodenoscopy (EGD) 03/27/2013    w/ tube 04607 (1)  -  Normal esophagus. Gastritis in stomach. Biopsy taken. Normal duodenum. Biospy taken   • History of marijuana use    • HPV (human papilloma virus) infection    • IBS (irritable bowel syndrome)    • Influenza    • Irregular periods    • Irritable bowel syndrome    • Migraine    • Nausea    • Nausea and vomiting    • Osgood-Schlatter's disease    • Pain in throat    • Patellar tendonitis    • Right upper quadrant pain    • Streptococcal sore throat    • Temporomandibular joint disorder     WISDOM TEETH    • Upper respiratory infection    • Urgency of urination    • Urinary tract infectious disease    • Varicella    • Viral gastroenteritis    • Vulvovaginitis        Past Surgical Hx:  Past Surgical History:   Procedure Laterality Date   • CHOLECYSTECTOMY  06/06/2013    Laparoscopic  -  laparoscopic cholecystectomy with intraoperative cholangiogram. Cholecystitis.   • INJECTION OF MEDICATION  01/08/2013    Toradol (1)   -  FLORY Sotomayor   • LAPAROSCOPIC CHOLECYSTECTOMY     • WISDOM TOOTH EXTRACTION         Current Meds:    Current Outpatient Medications:   •  albuterol sulfate HFA (Ventolin HFA) 108 (90 Base) MCG/ACT inhaler, Inhale 2 puffs Every 4 (Four) Hours As Needed for Wheezing., Disp: 6.7 g, Rfl: 0  •  atomoxetine (Strattera) 80 MG capsule, Take 1 capsule by mouth Daily., Disp: 30 capsule, Rfl: 0  •  QUEtiapine (SEROquel) 100 MG tablet, Take 1 tablet by mouth Every Night., Disp: 30 tablet, Rfl: 1  •  dextromethorphan polistirex ER (Delsym) 30 MG/5ML Suspension Extended Release oral suspension, Take 10 mL by mouth Every 12 (Twelve) Hours., Disp: 280 mL, Rfl: 0  •  escitalopram (Lexapro) 10 MG tablet, Take 1 tablet by mouth Daily., Disp: 30 tablet, Rfl: 0  •  fluticasone (Flonase) 50 MCG/ACT nasal spray, 2 sprays into the nostril(s) as directed by provider Daily.,  Disp: 18.2 mL, Rfl: 0    Allergies:  Allergies   Allergen Reactions   • Aspirin Shortness Of Breath     TIGHT CHEST   • Erythromycin Base Rash   • Latex Rash     Rash        Family Hx:  Family History   Adopted: Yes   Problem Relation Age of Onset   • Cancer Other    • Diabetes Other    • Heart disease Other    • Hypertension Other    • Stroke Other    • Lung disease Other    • Cholelithiasis Other    • Ulcers Other    • Other Other         Colon problems   • Ovarian cysts Mother    • Bipolar disorder Mother    • Irritable bowel syndrome Mother    • Ovarian cancer Mother    • No Known Problems Sister    • Emphysema Maternal Grandmother    • Heart attack Maternal Grandfather    • No Known Problems Sister    • No Known Problems Sister         Social History:  Social History     Socioeconomic History   • Marital status:    Tobacco Use   • Smoking status: Never Smoker   • Smokeless tobacco: Never Used   Substance and Sexual Activity   • Alcohol use: No   • Drug use: Yes     Types: Marijuana     Comment: stopped with positive pregnancy test    • Sexual activity: Yes     Partners: Male     Birth control/protection: None     Comment: last pap smear 5/17/19 ASCUS        Review of Systems  Review of Systems   Constitutional: Negative for activity change, appetite change, fatigue and fever.   HENT: Positive for congestion, postnasal drip and sore throat. Negative for rhinorrhea and sinus pain.    Eyes: Negative for pain, redness and visual disturbance.   Respiratory: Positive for cough. Negative for shortness of breath and wheezing.    Cardiovascular: Negative for chest pain, palpitations and leg swelling.   Gastrointestinal: Negative for abdominal pain, constipation, diarrhea, nausea and vomiting.   Genitourinary: Negative for dysuria and flank pain.   Musculoskeletal: Negative for arthralgias, back pain, joint swelling and myalgias.   Skin: Negative for color change, pallor and rash.   Neurological: Negative for  "dizziness, light-headedness and headaches.       Objective:     /76   Pulse 93   Ht 177.8 cm (70\")   Wt 82.1 kg (181 lb)   SpO2 98%   BMI 25.97 kg/m²   Physical Exam  Vitals and nursing note reviewed.   Constitutional:       General: She is not in acute distress.     Appearance: Normal appearance. She is well-developed. She is not diaphoretic.   HENT:      Head: Normocephalic and atraumatic.      Right Ear: Hearing normal.      Left Ear: Hearing normal.      Mouth/Throat:      Mouth: Mucous membranes are moist.      Pharynx: Posterior oropharyngeal erythema present.      Tonsils: No tonsillar exudate. 2+ on the right. 2+ on the left.   Eyes:      General: Lids are normal.      Conjunctiva/sclera: Conjunctivae normal.      Pupils: Pupils are equal, round, and reactive to light.   Cardiovascular:      Rate and Rhythm: Normal rate and regular rhythm.      Heart sounds: Normal heart sounds.   Pulmonary:      Effort: Pulmonary effort is normal. No respiratory distress.      Breath sounds: Normal breath sounds. No wheezing or rales.   Abdominal:      General: Bowel sounds are normal. There is no distension.      Palpations: Abdomen is soft.      Tenderness: There is no abdominal tenderness.   Musculoskeletal:         General: No tenderness or deformity. Normal range of motion.      Right lower leg: No edema.      Left lower leg: No edema.   Skin:     General: Skin is warm and dry.      Coloration: Skin is not pale.      Findings: No erythema or rash.   Neurological:      Mental Status: She is alert and oriented to person, place, and time. She is not disoriented.          Assessment/Plan:     Diagnoses and all orders for this visit:    1. Pharyngitis, unspecified etiology (Primary)  -     dextromethorphan polistirex ER (Delsym) 30 MG/5ML Suspension Extended Release oral suspension; Take 10 mL by mouth Every 12 (Twelve) Hours.  Dispense: 280 mL; Refill: 0  -     fluticasone (Flonase) 50 MCG/ACT nasal spray; 2 " sprays into the nostril(s) as directed by provider Daily.  Dispense: 18.2 mL; Refill: 0  -     Ambulatory Referral to ENT (Otolaryngology)    2. Recurrent tonsillitis  -     Ambulatory Referral to ENT (Otolaryngology)    3. Sore throat  -     POCT rapid strep A    4. Mild intermittent extrinsic asthma without complication  -     albuterol sulfate HFA (Ventolin HFA) 108 (90 Base) MCG/ACT inhaler; Inhale 2 puffs Every 4 (Four) Hours As Needed for Wheezing.  Dispense: 6.7 g; Refill: 0    5. Other insomnia  -     QUEtiapine (SEROquel) 100 MG tablet; Take 1 tablet by mouth Every Night.  Dispense: 30 tablet; Refill: 1    6. Generalized anxiety disorder  -     escitalopram (Lexapro) 10 MG tablet; Take 1 tablet by mouth Daily.  Dispense: 30 tablet; Refill: 0    7. Attention deficit disorder (ADD) without hyperactivity  -     atomoxetine (Strattera) 80 MG capsule; Take 1 capsule by mouth Daily.  Dispense: 30 capsule; Refill: 0    1/2/3/4. Continue Cefzil prescribed by urgent care center. Will utilize Delsym and Flonase for symptom relief. Will refer to ENT due to recurrent infections.    5/6/7. Will add on Lexapro for anxiety symptoms. Increase Straterra to 80mg as a more appropriate dose.     · Rx changes: see a/p  · Patient Education: see a/p  · Compliance at present is estimated to be good.     Follow-up:     Return in about 4 weeks (around 6/14/2022) for Recheck.    Preventative:  Health Maintenance   Topic Date Due   • ANNUAL PHYSICAL  Never done   • Pneumococcal Vaccine 0-64 (1 - PCV) Never done   • COVID-19 Vaccine (3 - Booster for Pfizer series) 01/31/2022   • INFLUENZA VACCINE  08/01/2022   • PAP SMEAR  10/06/2024   • TDAP/TD VACCINES (3 - Td or Tdap) 11/14/2029   • HEPATITIS C SCREENING  Completed         Alcohol use:  reports no history of alcohol use.  Nicotine status  reports that she has never smoked. She has never used smokeless tobacco.     Goals     •  Less migraine (pt-stated)       Multiple stressors  (divorce, new job)        •  Less pain       T7 compression fracture              RISK SCORE: 3      Shelton Corrigan M.D. PGY3  [facility] Family Medicine Residency  [address] [place] [address]  Office: [phone]  This document has been electronically signed by Shelton Corrigan MD on May 23, 2022 09:33 CDT

## 2022-05-25 DIAGNOSIS — G47.09 OTHER INSOMNIA: ICD-10-CM

## 2022-05-25 RX ORDER — QUETIAPINE FUMARATE 100 MG/1
100 TABLET, FILM COATED ORAL NIGHTLY
Qty: 30 TABLET | Refills: 1 | Status: CANCELLED | OUTPATIENT
Start: 2022-05-25

## 2022-05-31 DIAGNOSIS — F41.1 GENERALIZED ANXIETY DISORDER: ICD-10-CM

## 2022-05-31 DIAGNOSIS — G47.09 OTHER INSOMNIA: ICD-10-CM

## 2022-05-31 RX ORDER — QUETIAPINE FUMARATE 100 MG/1
100 TABLET, FILM COATED ORAL NIGHTLY
Qty: 30 TABLET | Refills: 1 | Status: SHIPPED | OUTPATIENT
Start: 2022-05-31 | End: 2022-07-19 | Stop reason: SDUPTHER

## 2022-06-08 DIAGNOSIS — N76.0 BACTERIAL VAGINOSIS: ICD-10-CM

## 2022-06-08 DIAGNOSIS — F90.2 ADHD (ATTENTION DEFICIT HYPERACTIVITY DISORDER), COMBINED TYPE: ICD-10-CM

## 2022-06-08 DIAGNOSIS — J02.9 PHARYNGITIS, UNSPECIFIED ETIOLOGY: ICD-10-CM

## 2022-06-08 DIAGNOSIS — G47.09 OTHER INSOMNIA: ICD-10-CM

## 2022-06-08 DIAGNOSIS — B96.89 BACTERIAL VAGINOSIS: ICD-10-CM

## 2022-06-08 DIAGNOSIS — L03.012 PARONYCHIA OF FINGER OF LEFT HAND: ICD-10-CM

## 2022-06-08 DIAGNOSIS — R11.0 NAUSEA: Primary | ICD-10-CM

## 2022-06-08 RX ORDER — ATOMOXETINE 40 MG/1
CAPSULE ORAL
Qty: 14 CAPSULE | Refills: 0 | OUTPATIENT
Start: 2022-06-08

## 2022-06-08 RX ORDER — CLONIDINE HYDROCHLORIDE 0.1 MG/1
TABLET ORAL
Qty: 9 TABLET | Refills: 0 | OUTPATIENT
Start: 2022-06-08 | End: 2022-06-14

## 2022-06-08 RX ORDER — PROMETHAZINE HYDROCHLORIDE 25 MG/1
25 TABLET ORAL EVERY 6 HOURS PRN
Qty: 30 TABLET | Refills: 0 | Status: SHIPPED | OUTPATIENT
Start: 2022-06-08 | End: 2022-06-29

## 2022-06-08 RX ORDER — CLONIDINE HYDROCHLORIDE 0.3 MG/1
TABLET ORAL
Qty: 3 TABLET | Refills: 0 | OUTPATIENT
Start: 2022-06-08

## 2022-06-08 RX ORDER — DOXYCYCLINE HYCLATE 100 MG/1
CAPSULE ORAL
Qty: 14 CAPSULE | Refills: 0 | OUTPATIENT
Start: 2022-06-08

## 2022-06-08 RX ORDER — FLUTICASONE PROPIONATE 50 MCG
SPRAY, SUSPENSION (ML) NASAL
Qty: 16 ML | Refills: 1 | Status: SHIPPED | OUTPATIENT
Start: 2022-06-08 | End: 2022-07-05

## 2022-06-08 RX ORDER — METRONIDAZOLE 500 MG/1
TABLET ORAL
Qty: 14 TABLET | Refills: 0 | OUTPATIENT
Start: 2022-06-08

## 2022-06-09 RX ORDER — ESCITALOPRAM OXALATE 10 MG/1
10 TABLET ORAL DAILY
Qty: 30 TABLET | Refills: 0 | Status: CANCELLED | OUTPATIENT
Start: 2022-06-09

## 2022-06-09 RX ORDER — QUETIAPINE FUMARATE 100 MG/1
100 TABLET, FILM COATED ORAL NIGHTLY
Qty: 30 TABLET | Refills: 1 | Status: CANCELLED | OUTPATIENT
Start: 2022-06-09

## 2022-06-13 ENCOUNTER — TELEPHONE (OUTPATIENT)
Dept: FAMILY MEDICINE CLINIC | Facility: CLINIC | Age: 27
End: 2022-06-13

## 2022-06-13 DIAGNOSIS — J45.20 MILD INTERMITTENT EXTRINSIC ASTHMA WITHOUT COMPLICATION: ICD-10-CM

## 2022-06-13 DIAGNOSIS — F41.1 GENERALIZED ANXIETY DISORDER: ICD-10-CM

## 2022-06-13 RX ORDER — ESCITALOPRAM OXALATE 10 MG/1
10 TABLET ORAL DAILY
Qty: 30 TABLET | Refills: 0 | Status: SHIPPED | OUTPATIENT
Start: 2022-06-13 | End: 2022-07-05 | Stop reason: SDUPTHER

## 2022-06-13 NOTE — TELEPHONE ENCOUNTER
Patient's pharmacy had 3 seroquel prescriptions on file.  Clarified 100 mg is what is on file and patient will be able to  today.  Informed patient

## 2022-06-22 ENCOUNTER — OFFICE VISIT (OUTPATIENT)
Dept: FAMILY MEDICINE CLINIC | Facility: CLINIC | Age: 27
End: 2022-06-22
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
## 2022-06-22 DIAGNOSIS — S40.262A TICK BITE OF LEFT SHOULDER, INITIAL ENCOUNTER: Primary | ICD-10-CM

## 2022-06-22 PROCEDURE — 99442 PR PHYS/QHP TELEPHONE EVALUATION 11-20 MIN: CPT | Performed by: STUDENT IN AN ORGANIZED HEALTH CARE EDUCATION/TRAINING PROGRAM

## 2022-06-22 RX ORDER — DOXYCYCLINE HYCLATE 100 MG/1
100 CAPSULE ORAL 2 TIMES DAILY
Qty: 28 CAPSULE | Refills: 0 | Status: SHIPPED | OUTPATIENT
Start: 2022-06-22 | End: 2022-07-19

## 2022-06-29 DIAGNOSIS — R11.0 NAUSEA: ICD-10-CM

## 2022-06-29 RX ORDER — PROMETHAZINE HYDROCHLORIDE 25 MG/1
TABLET ORAL
Qty: 30 TABLET | Refills: 0 | Status: SHIPPED | OUTPATIENT
Start: 2022-06-29 | End: 2022-08-16 | Stop reason: SDUPTHER

## 2022-07-05 ENCOUNTER — TELEPHONE (OUTPATIENT)
Dept: FAMILY MEDICINE CLINIC | Facility: CLINIC | Age: 27
End: 2022-07-05

## 2022-07-05 DIAGNOSIS — J02.9 PHARYNGITIS, UNSPECIFIED ETIOLOGY: ICD-10-CM

## 2022-07-05 DIAGNOSIS — F98.8 ATTENTION DEFICIT DISORDER (ADD) WITHOUT HYPERACTIVITY: ICD-10-CM

## 2022-07-05 DIAGNOSIS — F41.1 GENERALIZED ANXIETY DISORDER: ICD-10-CM

## 2022-07-05 RX ORDER — ESCITALOPRAM OXALATE 10 MG/1
10 TABLET ORAL DAILY
Qty: 30 TABLET | Refills: 0 | Status: SHIPPED | OUTPATIENT
Start: 2022-07-05 | End: 2022-07-07 | Stop reason: SDUPTHER

## 2022-07-05 RX ORDER — ATOMOXETINE 80 MG/1
80 CAPSULE ORAL DAILY
Qty: 30 CAPSULE | Refills: 0 | Status: SHIPPED | OUTPATIENT
Start: 2022-07-05 | End: 2022-07-07 | Stop reason: SDUPTHER

## 2022-07-05 RX ORDER — FLUTICASONE PROPIONATE 50 MCG
SPRAY, SUSPENSION (ML) NASAL
Qty: 16 ML | Refills: 1 | Status: SHIPPED | OUTPATIENT
Start: 2022-07-05 | End: 2022-07-29

## 2022-07-05 NOTE — TELEPHONE ENCOUNTER
NO ANSWER WHEN TRYING TO CONTACT PT TO SCHEDULE APT  ----- Message from Robert Bush PharmD sent at 7/5/2022  2:01 PM CDT -----  Patient needs appointment with Dr. Tapia end of this month or next for insomnia and ADHD.  Should only be with Dr. Tapia, time is flexible

## 2022-07-05 NOTE — PROGRESS NOTES
I have seen the patient.  I have reviewed the notes, assessments, and/or procedures performed by Evelyn Roger MD during office visit I concur with her/his documentation and assessment and plan for Millie Quevedo.            This document has been electronically signed by Niels Lock MD on July 5, 2022 16:20 CDT

## 2022-07-07 DIAGNOSIS — F41.1 GENERALIZED ANXIETY DISORDER: ICD-10-CM

## 2022-07-07 DIAGNOSIS — F98.8 ATTENTION DEFICIT DISORDER (ADD) WITHOUT HYPERACTIVITY: ICD-10-CM

## 2022-07-07 RX ORDER — ATOMOXETINE 80 MG/1
80 CAPSULE ORAL DAILY
Qty: 30 CAPSULE | Refills: 0 | Status: SHIPPED | OUTPATIENT
Start: 2022-07-07 | End: 2022-07-19 | Stop reason: SDUPTHER

## 2022-07-07 RX ORDER — ESCITALOPRAM OXALATE 10 MG/1
10 TABLET ORAL DAILY
Qty: 30 TABLET | Refills: 0 | Status: SHIPPED | OUTPATIENT
Start: 2022-07-07 | End: 2022-07-29 | Stop reason: SDUPTHER

## 2022-07-19 ENCOUNTER — OFFICE VISIT (OUTPATIENT)
Dept: FAMILY MEDICINE CLINIC | Facility: CLINIC | Age: 27
End: 2022-07-19

## 2022-07-19 VITALS
HEIGHT: 70 IN | SYSTOLIC BLOOD PRESSURE: 126 MMHG | WEIGHT: 197.6 LBS | HEART RATE: 94 BPM | DIASTOLIC BLOOD PRESSURE: 62 MMHG | BODY MASS INDEX: 28.29 KG/M2 | OXYGEN SATURATION: 96 %

## 2022-07-19 DIAGNOSIS — F90.2 ADHD (ATTENTION DEFICIT HYPERACTIVITY DISORDER), COMBINED TYPE: Primary | ICD-10-CM

## 2022-07-19 DIAGNOSIS — G47.09 OTHER INSOMNIA: ICD-10-CM

## 2022-07-19 PROCEDURE — 99213 OFFICE O/P EST LOW 20 MIN: CPT | Performed by: CHIROPRACTOR

## 2022-07-19 RX ORDER — RAMELTEON 8 MG/1
8 TABLET ORAL NIGHTLY
Qty: 30 TABLET | Refills: 2 | Status: SHIPPED | OUTPATIENT
Start: 2022-07-19 | End: 2022-08-16

## 2022-07-19 RX ORDER — QUETIAPINE FUMARATE 100 MG/1
100 TABLET, FILM COATED ORAL NIGHTLY
Qty: 30 TABLET | Refills: 1 | Status: SHIPPED | OUTPATIENT
Start: 2022-07-19 | End: 2022-08-16 | Stop reason: SDUPTHER

## 2022-07-19 RX ORDER — ATOMOXETINE 100 MG/1
100 CAPSULE ORAL DAILY
Qty: 30 CAPSULE | Refills: 2 | Status: SHIPPED | OUTPATIENT
Start: 2022-07-19 | End: 2022-08-02 | Stop reason: SDUPTHER

## 2022-07-19 NOTE — PROGRESS NOTES
Family Medicine Residency  Robel Curry MD    Subjective:     Millie Quevedo is a 26 y.o. female who presents for ADHD and insomnia.    The following portions of the patient's history were reviewed and updated as appropriate: allergies, current medications, past family history, past medical history, past social history, past surgical history and problem list.    Past Medical Hx:  Past Medical History:   Diagnosis Date   • Abnormal Pap smear of cervix    • Acute maxillary sinusitis    • Acute otitis externa    • Acute pharyngitis    • Acute tonsillitis    • Allergic asthma     IgE-mediated allergic asthma     • Allergic rhinitis    • Allergic rhinitis due to pollen    • Anxiety    • Asthma    • Chondromalacia of patella     left knee    • Closed wedge compression fracture of T7 vertebra with routine healing 8/11/2020   • Common cold    • Cough    • Diarrhea    • Disorder of gallbladder     Probable biliary dyskinesia    • Epigastric pain    • Foot pain    • Gastroenteritis    • Generalized abdominal pain    • Headache    • History of colonoscopy 03/27/2013    Colon endoscopy 44586 (1)  -  Internal & external hemorrhoids found.   • History of esophagogastroduodenoscopy (EGD) 03/27/2013    w/ tube 20271 (1)  -  Normal esophagus. Gastritis in stomach. Biopsy taken. Normal duodenum. Biospy taken   • History of marijuana use    • HPV (human papilloma virus) infection    • IBS (irritable bowel syndrome)    • Influenza    • Irregular periods    • Irritable bowel syndrome    • Migraine    • Nausea    • Nausea and vomiting    • Osgood-Schlatter's disease    • Pain in throat    • Patellar tendonitis    • Right upper quadrant pain    • Streptococcal sore throat    • Temporomandibular joint disorder     WISDOM TEETH    • Upper respiratory infection    • Urgency of urination    • Urinary tract infectious disease    • Varicella    • Viral gastroenteritis    • Vulvovaginitis        Past Surgical Hx:  Past Surgical  History:   Procedure Laterality Date   • CHOLECYSTECTOMY  06/06/2013    Laparoscopic  -  laparoscopic cholecystectomy with intraoperative cholangiogram. Cholecystitis.   • INJECTION OF MEDICATION  01/08/2013    Toradol (1)   -  WOdalis Sotomayor   • LAPAROSCOPIC CHOLECYSTECTOMY     • WISDOM TOOTH EXTRACTION         Current Meds:    Current Outpatient Medications:   •  atomoxetine (Strattera) 100 MG capsule, Take 1 capsule by mouth Daily., Disp: 30 capsule, Rfl: 2  •  doxycycline (VIBRAMYCIN) 100 MG capsule, Take 1 capsule by mouth 2 (Two) Times a Day., Disp: 28 capsule, Rfl: 0  •  escitalopram (Lexapro) 10 MG tablet, Take 1 tablet by mouth Daily., Disp: 30 tablet, Rfl: 0  •  fluticasone (FLONASE) 50 MCG/ACT nasal spray, SPRAY 2 SPRAYS INTO THE NOSTRIL AS DIRECTED BY PROVIDER DAILY., Disp: 16 mL, Rfl: 1  •  ProAir  (90 Base) MCG/ACT inhaler, TAKE 2 PUFFS BY MOUTH EVERY 4 HOURS AS NEEDED FOR WHEEZE, Disp: 18 g, Rfl: 11  •  promethazine (PHENERGAN) 25 MG tablet, TAKE 1 TABLET BY MOUTH EVERY 6 HOURS AS NEEDED FOR NAUSEA OR VOMITING., Disp: 30 tablet, Rfl: 0  •  QUEtiapine (SEROquel) 100 MG tablet, Take 1 tablet by mouth Every Night., Disp: 30 tablet, Rfl: 1  •  dextromethorphan polistirex ER (Delsym) 30 MG/5ML Suspension Extended Release oral suspension, Take 10 mL by mouth Every 12 (Twelve) Hours., Disp: 280 mL, Rfl: 0  •  Melatonin 5 MG capsule, Take 5 mg by mouth Every Night., Disp: 30 each, Rfl: 2  •  ramelteon (ROZEREM) 8 MG tablet, Take 1 tablet by mouth Every Night., Disp: 30 tablet, Rfl: 2    Allergies:  Allergies   Allergen Reactions   • Aspirin Shortness Of Breath     TIGHT CHEST   • Erythromycin Base Rash   • Latex Rash     Rash        Family Hx:  Family History   Adopted: Yes   Problem Relation Age of Onset   • Cancer Other    • Diabetes Other    • Heart disease Other    • Hypertension Other    • Stroke Other    • Lung disease Other    • Cholelithiasis Other    • Ulcers Other    • Other Other         Colon  "problems   • Ovarian cysts Mother    • Bipolar disorder Mother    • Irritable bowel syndrome Mother    • Ovarian cancer Mother    • No Known Problems Sister    • Emphysema Maternal Grandmother    • Heart attack Maternal Grandfather    • No Known Problems Sister    • No Known Problems Sister         Social History:  Social History     Socioeconomic History   • Marital status:    Tobacco Use   • Smoking status: Never Smoker   • Smokeless tobacco: Never Used   Substance and Sexual Activity   • Alcohol use: No   • Drug use: Yes     Types: Marijuana     Comment: stopped with positive pregnancy test    • Sexual activity: Yes     Partners: Male     Birth control/protection: None     Comment: last pap smear 5/17/19 ASCUS        Review of Systems  Review of Systems   Constitutional: Negative for fatigue.   HENT: Positive for hearing loss. Negative for congestion, rhinorrhea and trouble swallowing.         Reports mildly decreased hearing in both ears.  This is a chronic condition.   Eyes: Negative for visual disturbance.   Respiratory: Negative for cough and shortness of breath.    Cardiovascular: Negative for chest pain and palpitations.   Gastrointestinal: Positive for diarrhea. Negative for abdominal pain and blood in stool.        She endorses having IBS D.   Genitourinary: Negative for dysuria and hematuria.   Musculoskeletal: Negative for gait problem.   Skin: Negative for color change.   Neurological: Negative for headaches.   Psychiatric/Behavioral: Positive for decreased concentration and sleep disturbance.       Objective:     /62   Pulse 94   Ht 177.8 cm (70\")   Wt 89.6 kg (197 lb 9.6 oz)   SpO2 96%   BMI 28.35 kg/m²   Physical Exam  Constitutional:       General: She is not in acute distress.     Appearance: Normal appearance. She is not ill-appearing or diaphoretic.   HENT:      Head: Normocephalic and atraumatic.      Right Ear: Tympanic membrane, ear canal and external ear normal. There is no " impacted cerumen.      Left Ear: Tympanic membrane, ear canal and external ear normal. There is no impacted cerumen.      Nose: Nose normal.      Mouth/Throat:      Mouth: Mucous membranes are moist.      Pharynx: No posterior oropharyngeal erythema.   Eyes:      General: No scleral icterus.        Right eye: No discharge.         Left eye: No discharge.      Extraocular Movements: Extraocular movements intact.   Neck:      Vascular: No carotid bruit.   Cardiovascular:      Rate and Rhythm: Normal rate and regular rhythm.      Pulses: Normal pulses.      Heart sounds: Normal heart sounds. No murmur heard.  Pulmonary:      Effort: Pulmonary effort is normal.      Breath sounds: Normal breath sounds. No wheezing.   Abdominal:      Palpations: Abdomen is soft. There is no mass.      Tenderness: There is no abdominal tenderness.   Musculoskeletal:      Cervical back: No rigidity. No muscular tenderness.      Right lower leg: No edema.      Left lower leg: No edema.   Lymphadenopathy:      Cervical: No cervical adenopathy.   Skin:     Capillary Refill: Capillary refill takes less than 2 seconds.      Findings: No lesion or rash.   Neurological:      General: No focal deficit present.      Mental Status: She is alert and oriented to person, place, and time.      Cranial Nerves: No cranial nerve deficit.      Sensory: No sensory deficit.      Motor: No weakness.   Psychiatric:         Mood and Affect: Mood normal.          Assessment/Plan:     Diagnoses and all orders for this visit:    1. ADHD (attention deficit hyperactivity disorder), combined type (Primary)  - Patient currently on Strattera 80 mg daily.  - She endorses some improvement however not maximal control.  - Discussed plan to increase the Strattera to 100 mg daily for better control.  -     atomoxetine (Strattera) 100 MG capsule; Take 1 capsule by mouth Daily.  Dispense: 30 capsule; Refill: 2    2. Other insomnia  - Patient currently on Seroquel 100 mg nightly  for insomnia.  - In past has tried clonidine, trazodone, melatonin, and Tylenol PM with no positive effect.  - Discussed plan to start Rozerem with melatonin as an extra agent.  If no improvement in 1 week she is going to call and we consider increasing the Seroquel dose.  -     ramelteon (ROZEREM) 8 MG tablet; Take 1 tablet by mouth Every Night.  Dispense: 30 tablet; Refill: 2  -     Melatonin 5 MG capsule; Take 5 mg by mouth Every Night.  Dispense: 30 each; Refill: 2  -     QUEtiapine (SEROquel) 100 MG tablet; Take 1 tablet by mouth Every Night.  Dispense: 30 tablet; Refill: 1        · Rx changes: Add ramelteon 8 mg, add melatonin 5 mg, increase Strattera to 100 mg.  ·   Follow-up:     Return in about 3 months (around 10/19/2022) for Recheck.    Preventative:  Health Maintenance   Topic Date Due   • ANNUAL PHYSICAL  Never done   • Pneumococcal Vaccine 0-64 (1 - PCV) Never done   • COVID-19 Vaccine (3 - Booster for Pfizer series) 01/31/2022   • INFLUENZA VACCINE  10/01/2022   • PAP SMEAR  10/06/2024   • TDAP/TD VACCINES (3 - Td or Tdap) 11/14/2029   • HEPATITIS C SCREENING  Completed       Alcohol use:  reports no history of alcohol use.  Nicotine status  reports that she has never smoked. She has never used smokeless tobacco.     Goals     •  Less migraine (pt-stated)       Multiple stressors (divorce, new job)        •  Less pain       T7 compression fracture              RISK SCORE: 2        This document has been electronically signed by Robel Curry MD on July 19, 2022 11:54 CDT

## 2022-07-26 ENCOUNTER — TELEPHONE (OUTPATIENT)
Dept: FAMILY MEDICINE CLINIC | Facility: CLINIC | Age: 27
End: 2022-07-26

## 2022-07-26 NOTE — TELEPHONE ENCOUNTER
Called patient and left a message on her voicemail to advise her that the new sleep medication should be prior authorized and approved by the end of today.  Told her to call [facility] pharmacy this evening to see if its been done.    This document has been electronically signed by Robel Curry MD on July 26, 2022 10:51 CDT

## 2022-07-27 NOTE — TELEPHONE ENCOUNTER
Attempted to return patient's call.  Per medicaid, patient has a different last name on file and needed to confirm this is the same patient.  Clarified, approval should result in 24 hours

## 2022-07-29 DIAGNOSIS — F41.1 GENERALIZED ANXIETY DISORDER: ICD-10-CM

## 2022-07-29 DIAGNOSIS — J02.9 PHARYNGITIS, UNSPECIFIED ETIOLOGY: ICD-10-CM

## 2022-07-29 RX ORDER — FLUTICASONE PROPIONATE 50 MCG
SPRAY, SUSPENSION (ML) NASAL
Qty: 16 ML | Refills: 1 | Status: SHIPPED | OUTPATIENT
Start: 2022-07-29 | End: 2022-08-22

## 2022-07-31 NOTE — PROGRESS NOTES
I have seen the patient and I have reviewed the notes, assessments, and/or procedures performed by Dr. Curry during office visit. I concur with her/his documentation and assessment and plan for Millie Quevedo.           This document has been electronically signed by Master Johnson MD on July 31, 2022 13:46 CDT

## 2022-08-01 RX ORDER — ESCITALOPRAM OXALATE 10 MG/1
10 TABLET ORAL DAILY
Qty: 30 TABLET | Refills: 2 | Status: SHIPPED | OUTPATIENT
Start: 2022-08-01 | End: 2022-08-02 | Stop reason: SDUPTHER

## 2022-08-02 DIAGNOSIS — F41.1 GENERALIZED ANXIETY DISORDER: ICD-10-CM

## 2022-08-02 DIAGNOSIS — F90.2 ADHD (ATTENTION DEFICIT HYPERACTIVITY DISORDER), COMBINED TYPE: ICD-10-CM

## 2022-08-02 RX ORDER — ESCITALOPRAM OXALATE 10 MG/1
10 TABLET ORAL DAILY
Qty: 30 TABLET | Refills: 2 | Status: SHIPPED | OUTPATIENT
Start: 2022-08-02 | End: 2022-08-23 | Stop reason: SDUPTHER

## 2022-08-02 RX ORDER — ATOMOXETINE 100 MG/1
100 CAPSULE ORAL DAILY
Qty: 30 CAPSULE | Refills: 2 | Status: SHIPPED | OUTPATIENT
Start: 2022-08-02

## 2022-08-04 PROCEDURE — 87635 SARS-COV-2 COVID-19 AMP PRB: CPT | Performed by: NURSE PRACTITIONER

## 2022-08-05 ENCOUNTER — APPOINTMENT (OUTPATIENT)
Dept: GENERAL RADIOLOGY | Facility: HOSPITAL | Age: 27
End: 2022-08-05

## 2022-08-05 ENCOUNTER — HOSPITAL ENCOUNTER (EMERGENCY)
Facility: HOSPITAL | Age: 27
Discharge: HOME | End: 2022-08-05
Attending: STUDENT IN AN ORGANIZED HEALTH CARE EDUCATION/TRAINING PROGRAM | Admitting: STUDENT IN AN ORGANIZED HEALTH CARE EDUCATION/TRAINING PROGRAM

## 2022-08-05 VITALS
WEIGHT: 197 LBS | RESPIRATION RATE: 20 BRPM | TEMPERATURE: 98.3 F | BODY MASS INDEX: 27.58 KG/M2 | DIASTOLIC BLOOD PRESSURE: 71 MMHG | SYSTOLIC BLOOD PRESSURE: 120 MMHG | HEIGHT: 71 IN | HEART RATE: 83 BPM | OXYGEN SATURATION: 98 %

## 2022-08-05 DIAGNOSIS — R06.02 SOB (SHORTNESS OF BREATH): Primary | ICD-10-CM

## 2022-08-05 DIAGNOSIS — U07.1 COVID: ICD-10-CM

## 2022-08-05 PROCEDURE — 99283 EMERGENCY DEPT VISIT LOW MDM: CPT

## 2022-08-05 PROCEDURE — 71045 X-RAY EXAM CHEST 1 VIEW: CPT

## 2022-08-05 RX ORDER — ALBUTEROL SULFATE 90 UG/1
2 AEROSOL, METERED RESPIRATORY (INHALATION) EVERY 6 HOURS PRN
Qty: 6.7 G | Refills: 0 | Status: SHIPPED | OUTPATIENT
Start: 2022-08-05

## 2022-08-05 RX ADMIN — SODIUM CHLORIDE, POTASSIUM CHLORIDE, SODIUM LACTATE AND CALCIUM CHLORIDE 1000 ML: 600; 310; 30; 20 INJECTION, SOLUTION INTRAVENOUS at 21:08

## 2022-08-06 NOTE — ED NOTES
"Pt c/o chest pressure and cough. Pt states that she recently tested positive for Covid and \"it feels like something is sitting on my chest.\"    "

## 2022-08-06 NOTE — ED PROVIDER NOTES
Subjective   26-year-old female with a history of asthma comes to the ER 1 day history of body aches, nausea, shortness of breath with a cough, feeling horrible.  Patient tested positive for the coronavirus yesterday.  She has been trying to take her inhalers at home, but her shortness of breath is not getting any better.  She has been sleeping a lot.      History provided by:  Patient   used: No        Review of Systems   Constitutional: Positive for activity change, appetite change, chills and fatigue. Negative for fever.   HENT: Negative for drooling.    Eyes: Negative for redness.   Respiratory: Positive for cough and shortness of breath. Negative for chest tightness and wheezing.    Cardiovascular: Negative for chest pain.   Gastrointestinal: Positive for nausea. Negative for abdominal pain and vomiting.   Genitourinary: Negative for flank pain.   Musculoskeletal: Positive for arthralgias and myalgias.   Skin: Negative for color change.   Neurological: Negative for seizures.   Psychiatric/Behavioral: Negative for confusion.       Past Medical History:   Diagnosis Date   • Abnormal Pap smear of cervix    • Acute maxillary sinusitis    • Acute otitis externa    • Acute pharyngitis    • Acute tonsillitis    • Allergic asthma     IgE-mediated allergic asthma     • Allergic rhinitis    • Allergic rhinitis due to pollen    • Anxiety    • Asthma    • Chondromalacia of patella     left knee    • Closed wedge compression fracture of T7 vertebra with routine healing 8/11/2020   • Common cold    • Cough    • Diarrhea    • Disorder of gallbladder     Probable biliary dyskinesia    • Epigastric pain    • Foot pain    • Gastroenteritis    • Generalized abdominal pain    • Headache    • History of colonoscopy 03/27/2013    Colon endoscopy 97140 (1)  -  Internal & external hemorrhoids found.   • History of esophagogastroduodenoscopy (EGD) 03/27/2013    w/ tube 09913 (1)  -  Normal esophagus. Gastritis in  stomach. Biopsy taken. Normal duodenum. Biospy taken   • History of marijuana use    • HPV (human papilloma virus) infection    • IBS (irritable bowel syndrome)    • Influenza    • Irregular periods    • Irritable bowel syndrome    • Migraine    • Nausea    • Nausea and vomiting    • Osgood-Schlatter's disease    • Pain in throat    • Patellar tendonitis    • Right upper quadrant pain    • Streptococcal sore throat    • Temporomandibular joint disorder     WISDOM TEETH    • Upper respiratory infection    • Urgency of urination    • Urinary tract infectious disease    • Varicella    • Viral gastroenteritis    • Vulvovaginitis        Allergies   Allergen Reactions   • Aspirin Shortness Of Breath     TIGHT CHEST   • Erythromycin Base Rash   • Latex Rash     Rash        Past Surgical History:   Procedure Laterality Date   • CHOLECYSTECTOMY  06/06/2013    Laparoscopic  -  laparoscopic cholecystectomy with intraoperative cholangiogram. Cholecystitis.   • INJECTION OF MEDICATION  01/08/2013    Toradol (1)   -  W. Sotomayor   • LAPAROSCOPIC CHOLECYSTECTOMY     • WISDOM TOOTH EXTRACTION         Family History   Adopted: Yes   Problem Relation Age of Onset   • Cancer Other    • Diabetes Other    • Heart disease Other    • Hypertension Other    • Stroke Other    • Lung disease Other    • Cholelithiasis Other    • Ulcers Other    • Other Other         Colon problems   • Ovarian cysts Mother    • Bipolar disorder Mother    • Irritable bowel syndrome Mother    • Ovarian cancer Mother    • No Known Problems Sister    • Emphysema Maternal Grandmother    • Heart attack Maternal Grandfather    • No Known Problems Sister    • No Known Problems Sister        Social History     Socioeconomic History   • Marital status:    Tobacco Use   • Smoking status: Never Smoker   • Smokeless tobacco: Never Used   Substance and Sexual Activity   • Alcohol use: No   • Drug use: Yes     Types: Marijuana     Comment: stopped with positive  "pregnancy test    • Sexual activity: Yes     Partners: Male     Birth control/protection: None     Comment: last pap smear 5/17/19 ASCUS            Objective    Vitals:    08/05/22 2046 08/05/22 2144 08/05/22 2145 08/05/22 2202   BP: 126/75 121/69 121/69 120/71   BP Location: Right arm  Right arm Right arm   Patient Position: Sitting  Sitting Sitting   Pulse:  78 76 83   Resp:    20   Temp:       TempSrc:       SpO2:  98% 98% 98%   Weight: 89.4 kg (197 lb)      Height: 180.3 cm (71\")          Physical Exam  Vitals and nursing note reviewed.   Constitutional:       General: She is not in acute distress.     Appearance: She is well-developed. She is obese. She is ill-appearing. She is not toxic-appearing or diaphoretic.   HENT:      Head: Normocephalic.      Right Ear: External ear normal.      Left Ear: External ear normal.      Nose: Congestion present. No rhinorrhea.   Eyes:      General: No scleral icterus.     Conjunctiva/sclera: Conjunctivae normal.   Pulmonary:      Effort: Pulmonary effort is normal. No accessory muscle usage or respiratory distress.      Breath sounds: No wheezing.   Chest:      Chest wall: No tenderness.   Abdominal:      General: Bowel sounds are normal.      Palpations: Abdomen is soft.      Tenderness: There is no abdominal tenderness (deep palpation). There is no guarding or rebound.   Skin:     General: Skin is warm and dry.      Capillary Refill: Capillary refill takes less than 2 seconds.   Neurological:      Mental Status: She is alert and oriented to person, place, and time.   Psychiatric:         Behavior: Behavior normal.         Procedures           ED Course      Results for orders placed or performed during the hospital encounter of 08/04/22   COVID-19, [facility] IN-HOUSE, NP SWAB IN TRANSPORT MEDIA 8-10 HR TAT - Swab, Anterior nasal    Specimen: Anterior nasal; Swab   Result Value Ref Range    COVID19 Detected (C) Not Detected - Ref. Range     XR Chest 1 View   Final Result    "   No active disease by portable imaging.      Electronically signed by:  Saad Cooney MD  8/5/2022 10:09 PM   CDT Workstation: [id number]                                             MDM  Number of Diagnoses or Management Options  COVID: new and requires workup  SOB (shortness of breath): new and requires workup  Diagnosis management comments: Vital signs are stable, afebrile.  Chest x-ray shows no acute cardiopulmonary process.  Patient's heart rate improved with IVF bolus.  Patient is COVID-positive from prior visit.  Recommend follow-up with her PCP.  Return precautions given.  We will call in an albuterol inhaler for her.  Patient is satting well on room air in no respiratory distress.  Patient states understanding and agreement with plan.      Final diagnoses:   SOB (shortness of breath)   COVID       ED Disposition  ED Disposition     ED Disposition   Discharge    Condition   Stable    Comment   --             Rom Tapia MD  [address]  [address] [place] [address]  [phone]    Schedule an appointment as soon as possible for a visit in 2 days  ER follow up         Medication List      Changed    * ProAir  (90 Base) MCG/ACT inhaler  Generic drug: albuterol sulfate HFA  TAKE 2 PUFFS BY MOUTH EVERY 4 HOURS AS NEEDED FOR WHEEZE  What changed: Another medication with the same name was added. Make sure you understand how and when to take each.     * albuterol sulfate  (90 Base) MCG/ACT inhaler  Commonly known as: PROVENTIL HFA;VENTOLIN HFA;PROAIR HFA  Inhale 2 puffs Every 6 (Six) Hours As Needed for Wheezing.  What changed: You were already taking a medication with the same name, and this prescription was added. Make sure you understand how and when to take each.         * This list has 2 medication(s) that are the same as other medications prescribed for you. Read the directions carefully, and ask your doctor or other care provider to review them with you.                Where to Get Your Medications      These medications were sent to [facility]/pharmacy #[id number] - [place], [place] - [address] [place] [address] - [phone]  - [phone] [address] [address]    Phone: [phone]   · albuterol sulfate  (90 Base) MCG/ACT inhaler          Enoch Padron MD  08/05/22 0248

## 2022-08-16 ENCOUNTER — OFFICE VISIT (OUTPATIENT)
Dept: FAMILY MEDICINE CLINIC | Facility: CLINIC | Age: 27
End: 2022-08-16

## 2022-08-16 VITALS
HEART RATE: 89 BPM | DIASTOLIC BLOOD PRESSURE: 78 MMHG | WEIGHT: 196.3 LBS | OXYGEN SATURATION: 98 % | BODY MASS INDEX: 27.48 KG/M2 | HEIGHT: 71 IN | TEMPERATURE: 98.5 F | SYSTOLIC BLOOD PRESSURE: 120 MMHG

## 2022-08-16 DIAGNOSIS — F41.9 ANXIETY: ICD-10-CM

## 2022-08-16 DIAGNOSIS — G47.00 INSOMNIA, UNSPECIFIED TYPE: Primary | ICD-10-CM

## 2022-08-16 DIAGNOSIS — R11.2 DRUG-INDUCED NAUSEA AND VOMITING: ICD-10-CM

## 2022-08-16 DIAGNOSIS — T50.905A DRUG-INDUCED NAUSEA AND VOMITING: ICD-10-CM

## 2022-08-16 DIAGNOSIS — U07.1 COVID-19: ICD-10-CM

## 2022-08-16 DIAGNOSIS — G47.09 OTHER INSOMNIA: ICD-10-CM

## 2022-08-16 PROCEDURE — 99214 OFFICE O/P EST MOD 30 MIN: CPT | Performed by: STUDENT IN AN ORGANIZED HEALTH CARE EDUCATION/TRAINING PROGRAM

## 2022-08-16 RX ORDER — QUETIAPINE FUMARATE 100 MG/1
100 TABLET, FILM COATED ORAL NIGHTLY
Qty: 30 TABLET | Refills: 1 | Status: CANCELLED | OUTPATIENT
Start: 2022-08-16

## 2022-08-16 RX ORDER — QUETIAPINE FUMARATE 100 MG/1
150 TABLET, FILM COATED ORAL NIGHTLY
Qty: 45 TABLET | Refills: 2 | Status: SHIPPED | OUTPATIENT
Start: 2022-08-16 | End: 2022-09-13 | Stop reason: SDUPTHER

## 2022-08-16 RX ORDER — PROMETHAZINE HYDROCHLORIDE 25 MG/1
25 TABLET ORAL EVERY 6 HOURS PRN
Qty: 30 TABLET | Refills: 0 | Status: SHIPPED | OUTPATIENT
Start: 2022-08-16 | End: 2022-11-28 | Stop reason: SDUPTHER

## 2022-08-17 NOTE — PROGRESS NOTES
[facility]  Rom Tapia MD    Subjective:     Millie Quevedo is a 26 y.o. female who presents for follow up for her insomnia and anxiety. Reports that ramelteon and melatonin did not help with her insomnia. Reports she has seen benefit since being on the seroquel and wished to increase this. Reports she used to have problems initiating and staying asleep. After seroquel she only has problems staying asleep sometimes right now. Was taking clonidine previously that helped but when she had stopped it at one time she had withdrawals and does not wish to be put back on this again.     ADHD - reports symptoms of decreased concentration, problems with focusing, and impulse control. Reports she was just put on her strattera 100mg recently. Reports nausea that worsens with increased dosages of strattera.     Reports having some cough and soa since she had covid recently but reports that her inhalers are helping well with this.     The following portions of the patient's history were reviewed and updated as appropriate: allergies, current medications, past family history, past medical history, past social history, past surgical history and problem list.    Past Medical Hx:  Past Medical History:   Diagnosis Date   • Abnormal Pap smear of cervix    • Acute maxillary sinusitis    • Acute otitis externa    • Acute pharyngitis    • Acute tonsillitis    • Allergic asthma     IgE-mediated allergic asthma     • Allergic rhinitis    • Allergic rhinitis due to pollen    • Anxiety    • Asthma    • Chondromalacia of patella     left knee    • Closed wedge compression fracture of T7 vertebra with routine healing 8/11/2020   • Common cold    • Cough    • Diarrhea    • Disorder of gallbladder     Probable biliary dyskinesia    • Epigastric pain    • Foot pain    • Gastroenteritis    • Generalized abdominal pain    • Headache    • History of colonoscopy 03/27/2013    Colon endoscopy 57502 (1)  -  Internal &  external hemorrhoids found.   • History of esophagogastroduodenoscopy (EGD) 03/27/2013    w/ tube 47009 (1)  -  Normal esophagus. Gastritis in stomach. Biopsy taken. Normal duodenum. Biospy taken   • History of marijuana use    • HPV (human papilloma virus) infection    • IBS (irritable bowel syndrome)    • Influenza    • Irregular periods    • Irritable bowel syndrome    • Migraine    • Nausea    • Nausea and vomiting    • Osgood-Schlatter's disease    • Pain in throat    • Patellar tendonitis    • Right upper quadrant pain    • Streptococcal sore throat    • Temporomandibular joint disorder     WISDOM TEETH    • Upper respiratory infection    • Urgency of urination    • Urinary tract infectious disease    • Varicella    • Viral gastroenteritis    • Vulvovaginitis        Past Surgical Hx:  Past Surgical History:   Procedure Laterality Date   • CHOLECYSTECTOMY  06/06/2013    Laparoscopic  -  laparoscopic cholecystectomy with intraoperative cholangiogram. Cholecystitis.   • INJECTION OF MEDICATION  01/08/2013    Toradol (1)   -  FLORY Sotomayor   • LAPAROSCOPIC CHOLECYSTECTOMY     • WISDOM TOOTH EXTRACTION         Current Meds:    Current Outpatient Medications:   •  albuterol sulfate  (90 Base) MCG/ACT inhaler, Inhale 2 puffs Every 6 (Six) Hours As Needed for Wheezing., Disp: 6.7 g, Rfl: 0  •  atomoxetine (Strattera) 100 MG capsule, Take 1 capsule by mouth Daily., Disp: 30 capsule, Rfl: 2  •  escitalopram (Lexapro) 10 MG tablet, Take 1 tablet by mouth Daily., Disp: 30 tablet, Rfl: 2  •  fluticasone (FLONASE) 50 MCG/ACT nasal spray, SPRAY 2 SPRAYS INTO THE NOSTRIL AS DIRECTED BY PROVIDER DAILY., Disp: 16 mL, Rfl: 1  •  ibuprofen (ADVIL,MOTRIN) 800 MG tablet, Take 1 tablet by mouth Every 8 (Eight) Hours As Needed for Mild Pain  or Moderate Pain ., Disp: 30 tablet, Rfl: 0  •  ProAir  (90 Base) MCG/ACT inhaler, TAKE 2 PUFFS BY MOUTH EVERY 4 HOURS AS NEEDED FOR WHEEZE, Disp: 18 g, Rfl: 11  •  promethazine  "(PHENERGAN) 25 MG tablet, Take 1 tablet by mouth Every 6 (Six) Hours As Needed for Nausea or Vomiting., Disp: 30 tablet, Rfl: 0  •  QUEtiapine (SEROquel) 100 MG tablet, Take 1.5 tablets by mouth Every Night., Disp: 45 tablet, Rfl: 2  •  brompheniramine-pseudoephedrine-DM (Bromfed DM) 30-2-10 MG/5ML syrup, Take one tsp BID prn cough and congestion, Disp: 120 mL, Rfl: 0    Allergies:  Allergies   Allergen Reactions   • Aspirin Shortness Of Breath     TIGHT CHEST   • Erythromycin Base Rash   • Latex Rash     Rash        Family Hx:  Family History   Adopted: Yes   Problem Relation Age of Onset   • Cancer Other    • Diabetes Other    • Heart disease Other    • Hypertension Other    • Stroke Other    • Lung disease Other    • Cholelithiasis Other    • Ulcers Other    • Other Other         Colon problems   • Ovarian cysts Mother    • Bipolar disorder Mother    • Irritable bowel syndrome Mother    • Ovarian cancer Mother    • No Known Problems Sister    • Emphysema Maternal Grandmother    • Heart attack Maternal Grandfather    • No Known Problems Sister    • No Known Problems Sister         Social History:  Social History     Socioeconomic History   • Marital status:    Tobacco Use   • Smoking status: Never Smoker   • Smokeless tobacco: Never Used   Substance and Sexual Activity   • Alcohol use: No   • Drug use: Yes     Types: Marijuana     Comment: stopped with positive pregnancy test    • Sexual activity: Yes     Partners: Male     Birth control/protection: None     Comment: last pap smear 5/17/19 ASCUS        Review of Systems  Review of Systems   Psychiatric/Behavioral: Positive for decreased concentration and sleep disturbance. The patient is nervous/anxious and is hyperactive.        Objective:     /78   Pulse 89   Temp 98.5 °F (36.9 °C)   Ht 180.3 cm (71\")   Wt 89 kg (196 lb 4.8 oz)   SpO2 98%   BMI 27.38 kg/m²   Physical Exam  HENT:      Head: Normocephalic and atraumatic.   Cardiovascular:      " Rate and Rhythm: Normal rate and regular rhythm.   Pulmonary:      Effort: Pulmonary effort is normal. No respiratory distress.      Breath sounds: Examination of the left-middle field reveals wheezing and rhonchi. Examination of the left-lower field reveals wheezing and rhonchi. Wheezing and rhonchi present.   Neurological:      Mental Status: She is alert. Mental status is at baseline.   Psychiatric:         Mood and Affect: Mood is anxious.         Speech: Speech normal.         Behavior: Behavior is cooperative.       Assessment/Plan:     Diagnoses and all orders for this visit:    1. Insomnia, unspecified type (Primary)  -     QUEtiapine (SEROquel) 100 MG tablet; Take 1.5 tablets by mouth Every Night.  Dispense: 45 tablet; Refill: 2    - Uncontrolled. Will d/c ramelteon and melatonin at this time. Increased patient's seroquel to 150mg nightly. If insomnia remains uncontrolled can consider adding doxepin. Insomnia likely worsened by patient's ADHD.     2. Anxiety  -     QUEtiapine (SEROquel) 100 MG tablet; Take 1.5 tablets by mouth Every Night.  Dispense: 45 tablet; Refill: 2    - Uncontrolled. Will continue patient's lexapro at this time as we are adjusting seroquel at this visit. Patient to call office in 1-2 weeks after seroquel adjustment then can consider increasing lexapro at that time.      3. Drug-induced nausea and vomiting  -     promethazine (PHENERGAN) 25 MG tablet; Take 1 tablet by mouth Every 6 (Six) Hours As Needed for Nausea or Vomiting.  Dispense: 30 tablet; Refill: 0    - nausea secondary to patient's strattera. Can consider switching to Qelbree in future for patient's ADHD and determine if nausea improves.     4. COVID-19  - controlled currently. Rhonchi and wheezes heard initially on exam but after patient coughed these sounds improved. Patient to continue current medical therapy inhalers.      Depression screening: Patient screened positive for depression based on a PHQ-9 score of 9 on  8/16/2022. Follow-up recommendations include: PCP managing depression. MORENO-7 score of 11 on 8/16/2022.      Follow-up:     Return in about 1 month (around 9/16/2022).    Preventative:  Health Maintenance   Topic Date Due   • ANNUAL PHYSICAL  Never done   • Pneumococcal Vaccine 0-64 (1 - PCV) Never done   • COVID-19 Vaccine (3 - Booster for Pfizer series) 01/31/2022   • INFLUENZA VACCINE  10/01/2022   • PAP SMEAR  10/06/2024   • TDAP/TD VACCINES (3 - Td or Tdap) 11/14/2029   • HEPATITIS C SCREENING  Completed     Alcohol use:  reports no history of alcohol use.  Nicotine status  reports that she has never smoked. She has never used smokeless tobacco.     Goals     •  Less migraine (pt-stated)       Multiple stressors (divorce, new job)      •  Less pain       T7 compression fracture            RISK SCORE: 2      This document has been electronically signed by Rom Tapia MD on August 16, 2022 19:44 CDT

## 2022-08-18 NOTE — PROGRESS NOTES
I have seen the patient.  I have reviewed the notes, assessments, and/or procedures performed by Rom Tapia MD during office visit I concur with her/his documentation and assessment and plan for Millie Harkinsless.            This document has been electronically signed by Shelton Corrigan MD on August 18, 2022 09:28 CDT

## 2022-08-22 DIAGNOSIS — J02.9 PHARYNGITIS, UNSPECIFIED ETIOLOGY: ICD-10-CM

## 2022-08-22 RX ORDER — FLUTICASONE PROPIONATE 50 MCG
SPRAY, SUSPENSION (ML) NASAL
Qty: 16 ML | Refills: 1 | Status: SHIPPED | OUTPATIENT
Start: 2022-08-22 | End: 2022-09-14

## 2022-08-23 DIAGNOSIS — F41.1 GENERALIZED ANXIETY DISORDER: ICD-10-CM

## 2022-08-23 RX ORDER — ESCITALOPRAM OXALATE 10 MG/1
10 TABLET ORAL DAILY
Qty: 30 TABLET | Refills: 2 | Status: SHIPPED | OUTPATIENT
Start: 2022-08-23

## 2022-08-26 PROCEDURE — 87635 SARS-COV-2 COVID-19 AMP PRB: CPT | Performed by: EMERGENCY MEDICINE

## 2022-09-13 DIAGNOSIS — F41.9 ANXIETY: ICD-10-CM

## 2022-09-13 DIAGNOSIS — G47.00 INSOMNIA, UNSPECIFIED TYPE: ICD-10-CM

## 2022-09-13 RX ORDER — QUETIAPINE FUMARATE 100 MG/1
150 TABLET, FILM COATED ORAL NIGHTLY
Qty: 45 TABLET | Refills: 2 | Status: SHIPPED | OUTPATIENT
Start: 2022-09-13 | End: 2022-11-04 | Stop reason: SDUPTHER

## 2022-09-14 DIAGNOSIS — J02.9 PHARYNGITIS, UNSPECIFIED ETIOLOGY: ICD-10-CM

## 2022-09-14 RX ORDER — FLUTICASONE PROPIONATE 50 MCG
SPRAY, SUSPENSION (ML) NASAL
Qty: 16 ML | Refills: 1 | Status: SHIPPED | OUTPATIENT
Start: 2022-09-14 | End: 2022-10-10

## 2022-09-21 ENCOUNTER — PROCEDURE VISIT (OUTPATIENT)
Dept: OBSTETRICS AND GYNECOLOGY | Facility: CLINIC | Age: 27
End: 2022-09-21

## 2022-09-21 VITALS
SYSTOLIC BLOOD PRESSURE: 118 MMHG | BODY MASS INDEX: 28.67 KG/M2 | HEIGHT: 71 IN | WEIGHT: 204.8 LBS | DIASTOLIC BLOOD PRESSURE: 74 MMHG

## 2022-09-21 DIAGNOSIS — Z30.46 ENCOUNTER FOR NEXPLANON REMOVAL: Primary | ICD-10-CM

## 2022-09-21 PROCEDURE — 11982 REMOVE DRUG IMPLANT DEVICE: CPT | Performed by: NURSE PRACTITIONER

## 2022-09-21 NOTE — PROGRESS NOTES
Nexplanon Removal    Date of procedure:  9/21/2022    Risks and benefits discussed? Yes  Pt desires pregnancy.   All questions answered? yes  Consents given by the patient  Written consent obtained? yes    Local anesthesia used:  yes - 3 cc's of  Meds; anesthesia local: 1% lidocaine    Procedure documentation:    The upper left arm (non-dominant) was marked at the intended site of removal.  The skin was cleansed with an antispetic solution.  Local anesthesia was injected.  A vertical horizontal was created at the distal tip of the implant.  The implant was removed intact without difficulty.  A 4x4 sterile gauze was placed over the incision site and the arm was wrapped with gauze.  She tolerated the procedure well.  There were no complications.  EBL was minimal.    Post procedure instructions: Remove the wrapping in 24 hours and cover with a band-aid if still open.    Follow up needed: PRN        Diagnoses and all orders for this visit:    1. Encounter for Nexplanon removal (Primary)    Counseled to start taking PNVs. Reviewedd current medications with pregnancy considerations with patient.     This note was electronically signed.    Jayla Savage, APRN  September 21, 2022

## 2022-10-07 PROCEDURE — 87635 SARS-COV-2 COVID-19 AMP PRB: CPT | Performed by: NURSE PRACTITIONER

## 2022-10-10 DIAGNOSIS — J02.9 PHARYNGITIS, UNSPECIFIED ETIOLOGY: ICD-10-CM

## 2022-10-10 RX ORDER — FLUTICASONE PROPIONATE 50 MCG
SPRAY, SUSPENSION (ML) NASAL
Qty: 16 ML | Refills: 1 | Status: SHIPPED | OUTPATIENT
Start: 2022-10-10

## 2022-11-04 DIAGNOSIS — G47.00 INSOMNIA, UNSPECIFIED TYPE: Primary | ICD-10-CM

## 2022-11-04 RX ORDER — QUETIAPINE FUMARATE 200 MG/1
200 TABLET, FILM COATED ORAL NIGHTLY
Qty: 30 TABLET | Refills: 0 | Status: SHIPPED | OUTPATIENT
Start: 2022-11-04 | End: 2022-11-28 | Stop reason: SDUPTHER

## 2022-11-18 PROCEDURE — 87635 SARS-COV-2 COVID-19 AMP PRB: CPT | Performed by: EMERGENCY MEDICINE

## 2022-11-28 DIAGNOSIS — T50.905A DRUG-INDUCED NAUSEA AND VOMITING: ICD-10-CM

## 2022-11-28 DIAGNOSIS — R11.2 DRUG-INDUCED NAUSEA AND VOMITING: ICD-10-CM

## 2022-11-28 DIAGNOSIS — G47.00 INSOMNIA, UNSPECIFIED TYPE: ICD-10-CM

## 2022-11-28 RX ORDER — QUETIAPINE FUMARATE 200 MG/1
200 TABLET, FILM COATED ORAL NIGHTLY
Qty: 30 TABLET | Refills: 0 | Status: SHIPPED | OUTPATIENT
Start: 2022-11-28 | End: 2022-12-07 | Stop reason: SDUPTHER

## 2022-11-28 RX ORDER — PROMETHAZINE HYDROCHLORIDE 25 MG/1
25 TABLET ORAL EVERY 6 HOURS PRN
Qty: 30 TABLET | Refills: 0 | Status: SHIPPED | OUTPATIENT
Start: 2022-11-28 | End: 2023-03-20 | Stop reason: SDUPTHER

## 2022-12-07 DIAGNOSIS — G47.00 INSOMNIA, UNSPECIFIED TYPE: ICD-10-CM

## 2022-12-07 RX ORDER — QUETIAPINE FUMARATE 200 MG/1
200 TABLET, FILM COATED ORAL NIGHTLY
Qty: 30 TABLET | Refills: 0 | Status: SHIPPED | OUTPATIENT
Start: 2022-12-07 | End: 2022-12-13 | Stop reason: SDUPTHER

## 2022-12-13 ENCOUNTER — OFFICE VISIT (OUTPATIENT)
Dept: FAMILY MEDICINE CLINIC | Facility: CLINIC | Age: 27
End: 2022-12-13

## 2022-12-13 VITALS
HEIGHT: 71 IN | SYSTOLIC BLOOD PRESSURE: 130 MMHG | HEART RATE: 90 BPM | OXYGEN SATURATION: 98 % | BODY MASS INDEX: 30.2 KG/M2 | DIASTOLIC BLOOD PRESSURE: 78 MMHG | WEIGHT: 215.7 LBS

## 2022-12-13 DIAGNOSIS — G47.00 INSOMNIA, UNSPECIFIED TYPE: ICD-10-CM

## 2022-12-13 DIAGNOSIS — R59.1 LYMPHADENOPATHY: Primary | ICD-10-CM

## 2022-12-13 PROCEDURE — 99213 OFFICE O/P EST LOW 20 MIN: CPT | Performed by: STUDENT IN AN ORGANIZED HEALTH CARE EDUCATION/TRAINING PROGRAM

## 2022-12-13 RX ORDER — QUETIAPINE FUMARATE 200 MG/1
200 TABLET, FILM COATED ORAL NIGHTLY
Qty: 30 TABLET | Refills: 0 | Status: SHIPPED | OUTPATIENT
Start: 2022-12-13 | End: 2023-01-18

## 2022-12-13 NOTE — PROGRESS NOTES
Family Medicine Residency  Rom Tapia MD    Subjective:     Millie Quevedo is a 27 y.o. female who presents for lump under left armpit.     Started 1 month ago. Getting more tender, no changes in size. No skin changes. No other swellings. Had Covid about month ago. Had Nexplanon previously that was taken out in September and is currently seeking pregnancy. Has had periods since swelling started, no changes during periods.     Insomnia - controlled on Seroquel. Reports compliance with medicine. Denies any adverse effects.     Past Medical Hx:  Past Medical History:   Diagnosis Date   • Abnormal Pap smear of cervix    • Acute maxillary sinusitis    • Acute otitis externa    • Acute pharyngitis    • Acute tonsillitis    • Allergic asthma     IgE-mediated allergic asthma     • Allergic rhinitis    • Allergic rhinitis due to pollen    • Anxiety    • Asthma    • Chondromalacia of patella     left knee    • Closed wedge compression fracture of T7 vertebra with routine healing 8/11/2020   • Common cold    • Cough    • Diarrhea    • Disorder of gallbladder     Probable biliary dyskinesia    • Epigastric pain    • Foot pain    • Gastroenteritis    • Generalized abdominal pain    • Headache    • History of colonoscopy 03/27/2013    Colon endoscopy 08055 (1)  -  Internal & external hemorrhoids found.   • History of esophagogastroduodenoscopy (EGD) 03/27/2013    w/ tube 02697 (1)  -  Normal esophagus. Gastritis in stomach. Biopsy taken. Normal duodenum. Biospy taken   • History of marijuana use    • HPV (human papilloma virus) infection    • IBS (irritable bowel syndrome)    • Influenza    • Irregular periods    • Irritable bowel syndrome    • Migraine    • Nausea    • Nausea and vomiting    • Osgood-Schlatter's disease    • Pain in throat    • Patellar tendonitis    • Right upper quadrant pain    • Streptococcal sore throat    • Temporomandibular joint disorder     WISDOM TEETH    • Upper respiratory  infection    • Urgency of urination    • Urinary tract infectious disease    • Varicella    • Viral gastroenteritis    • Vulvovaginitis        Past Surgical Hx:  Past Surgical History:   Procedure Laterality Date   • CHOLECYSTECTOMY  06/06/2013    Laparoscopic  -  laparoscopic cholecystectomy with intraoperative cholangiogram. Cholecystitis.   • INJECTION OF MEDICATION  01/08/2013    Toradol (1)   -  WOdalis Sotomayor   • LAPAROSCOPIC CHOLECYSTECTOMY     • WISDOM TOOTH EXTRACTION         Current Meds:    Current Outpatient Medications:   •  albuterol sulfate  (90 Base) MCG/ACT inhaler, Inhale 2 puffs Every 6 (Six) Hours As Needed for Wheezing., Disp: 6.7 g, Rfl: 0  •  atomoxetine (Strattera) 100 MG capsule, Take 1 capsule by mouth Daily., Disp: 30 capsule, Rfl: 2  •  brompheniramine-pseudoephedrine-DM (Bromfed DM) 30-2-10 MG/5ML syrup, Take one tsp BID prn cough and congestion, Disp: 120 mL, Rfl: 0  •  escitalopram (Lexapro) 10 MG tablet, Take 1 tablet by mouth Daily., Disp: 30 tablet, Rfl: 2  •  fluticasone (FLONASE) 50 MCG/ACT nasal spray, SPRAY 2 SPRAYS INTO THE NOSTRIL AS DIRECTED BY PROVIDER DAILY., Disp: 16 mL, Rfl: 1  •  ibuprofen (ADVIL,MOTRIN) 600 MG tablet, Take 1 tablet by mouth Every 8 (Eight) Hours As Needed for Fever, Moderate Pain or Headache., Disp: 21 tablet, Rfl: 0  •  ProAir  (90 Base) MCG/ACT inhaler, TAKE 2 PUFFS BY MOUTH EVERY 4 HOURS AS NEEDED FOR WHEEZE, Disp: 18 g, Rfl: 11  •  promethazine (PHENERGAN) 25 MG tablet, Take 1 tablet by mouth Every 6 (Six) Hours As Needed for Nausea or Vomiting., Disp: 30 tablet, Rfl: 0  •  pseudoephedrine-guaifenesin (MUCINEX D)  MG per 12 hr tablet, Take 1 tablet by mouth Every 12 (Twelve) Hours As Needed for Cough or Congestion., Disp: 14 tablet, Rfl: 0  •  QUEtiapine (SEROquel) 200 MG tablet, Take 1 tablet by mouth Every Night., Disp: 30 tablet, Rfl: 0    Allergies:  Allergies   Allergen Reactions   • Aspirin Shortness Of Breath     TIGHT CHEST  "  • Erythromycin Base Rash   • Latex Rash     Rash        Family Hx:  Family History   Adopted: Yes   Problem Relation Age of Onset   • Cancer Other    • Diabetes Other    • Heart disease Other    • Hypertension Other    • Stroke Other    • Lung disease Other    • Cholelithiasis Other    • Ulcers Other    • Other Other         Colon problems   • Ovarian cysts Mother    • Bipolar disorder Mother    • Irritable bowel syndrome Mother    • Ovarian cancer Mother    • No Known Problems Sister    • Emphysema Maternal Grandmother    • Heart attack Maternal Grandfather    • No Known Problems Sister    • No Known Problems Sister         Social History:  Social History     Socioeconomic History   • Marital status:    Tobacco Use   • Smoking status: Never   • Smokeless tobacco: Never   Substance and Sexual Activity   • Alcohol use: No   • Drug use: Yes     Types: Marijuana     Comment: stopped with positive pregnancy test    • Sexual activity: Yes     Partners: Male     Birth control/protection: None     Comment: last pap smear 5/17/19 ASCUS        Review of Systems  Review of Systems   Constitutional: Negative for fever.   Respiratory: Negative for shortness of breath.    Cardiovascular: Negative for chest pain and leg swelling.   Gastrointestinal: Negative for abdominal pain, constipation, diarrhea, nausea and vomiting.   Genitourinary: Negative for difficulty urinating.   Musculoskeletal:        Lump in armpit   Skin: Negative for color change and rash.   Psychiatric/Behavioral: Negative for sleep disturbance.       Objective:     /78   Pulse 90   Ht 180.3 cm (71\")   Wt 97.8 kg (215 lb 11.2 oz)   SpO2 98%   BMI 30.08 kg/m²   Physical Exam  Constitutional:       General: She is not in acute distress.  HENT:      Head: Normocephalic and atraumatic.   Cardiovascular:      Rate and Rhythm: Normal rate and regular rhythm.   Pulmonary:      Effort: Pulmonary effort is normal. No respiratory distress.   Chest:     "  Comments: Firm, tender, mobile swelling palpated deep in left upper axilla  No skin changes or erythema  No swellings palpated in left supraclavicular or neck regions  Lymphadenopathy:      Upper Body:      Left upper body: Axillary adenopathy present.   Skin:     General: Skin is warm.   Neurological:      Mental Status: She is alert. Mental status is at baseline.   Psychiatric:         Mood and Affect: Mood normal.         Behavior: Behavior normal.          Assessment/Plan:     Diagnoses and all orders for this visit:    1. Lymphadenopathy (Primary)    2. Insomnia, unspecified type  -     QUEtiapine (SEROquel) 200 MG tablet; Take 1 tablet by mouth Every Night.  Dispense: 30 tablet; Refill: 0      1. Acute, stable. Believe that swelling is most likely lymphadenopathy from when patient had covid as this swelling started during illness. Advised patient that if there are changes in size, skin, or increase in tenderness or if any new swellings occur patient is to RTC and we can consider obtaining ultrasound.     2. Chronic, controlled on current medical therapy. Will continue current dosage of seroquel at this time.      Follow-up:     Return in about 3 months (around 3/13/2023) for Annual.    RISK SCORE: 3      This document has been electronically signed by Rom Tapia MD on December 14, 2022 12:19 CST

## 2022-12-14 NOTE — PROGRESS NOTES
I have spoken with the patient .  I have reviewed the notes, assessments, and/or procedures performed by Dr. Rom Tapia,   I concur with   his  documentation and assessment and plan for Millie Quevedo.          This document has been electronically signed by Frank Abreu MD on December 14, 2022 15:37 CST

## 2023-01-18 DIAGNOSIS — G47.09 OTHER INSOMNIA: Primary | ICD-10-CM

## 2023-01-18 DIAGNOSIS — G47.00 INSOMNIA, UNSPECIFIED TYPE: ICD-10-CM

## 2023-01-18 RX ORDER — QUETIAPINE FUMARATE 300 MG/1
300 TABLET, FILM COATED ORAL NIGHTLY
Qty: 30 TABLET | Refills: 3 | Status: SHIPPED | OUTPATIENT
Start: 2023-01-18 | End: 2023-02-10 | Stop reason: SDUPTHER

## 2023-01-25 PROCEDURE — 87635 SARS-COV-2 COVID-19 AMP PRB: CPT | Performed by: NURSE PRACTITIONER

## 2023-02-03 ENCOUNTER — DOCUMENTATION (OUTPATIENT)
Dept: FAMILY MEDICINE CLINIC | Facility: CLINIC | Age: 28
End: 2023-02-03
Payer: COMMERCIAL

## 2023-02-10 DIAGNOSIS — G47.00 INSOMNIA, UNSPECIFIED TYPE: ICD-10-CM

## 2023-02-10 RX ORDER — QUETIAPINE FUMARATE 300 MG/1
300 TABLET, FILM COATED ORAL NIGHTLY
Qty: 30 TABLET | Refills: 3 | Status: SHIPPED | OUTPATIENT
Start: 2023-02-10

## 2023-03-20 DIAGNOSIS — T50.905A DRUG-INDUCED NAUSEA AND VOMITING: ICD-10-CM

## 2023-03-20 DIAGNOSIS — R11.2 DRUG-INDUCED NAUSEA AND VOMITING: ICD-10-CM

## 2023-03-20 RX ORDER — PROMETHAZINE HYDROCHLORIDE 25 MG/1
25 TABLET ORAL EVERY 6 HOURS PRN
Qty: 30 TABLET | Refills: 0 | Status: SHIPPED | OUTPATIENT
Start: 2023-03-20

## 2023-04-12 DIAGNOSIS — G47.00 INSOMNIA, UNSPECIFIED TYPE: ICD-10-CM

## 2023-04-13 RX ORDER — QUETIAPINE FUMARATE 300 MG/1
300 TABLET, FILM COATED ORAL NIGHTLY
Qty: 30 TABLET | Refills: 3 | Status: SHIPPED | OUTPATIENT
Start: 2023-04-13

## 2023-04-14 ENCOUNTER — APPOINTMENT (OUTPATIENT)
Dept: CT IMAGING | Facility: HOSPITAL | Age: 28
End: 2023-04-14
Payer: COMMERCIAL

## 2023-04-14 ENCOUNTER — HOSPITAL ENCOUNTER (EMERGENCY)
Facility: HOSPITAL | Age: 28
Discharge: HOME | End: 2023-04-14
Attending: FAMILY MEDICINE | Admitting: FAMILY MEDICINE
Payer: COMMERCIAL

## 2023-04-14 VITALS
HEIGHT: 71 IN | WEIGHT: 211.6 LBS | SYSTOLIC BLOOD PRESSURE: 115 MMHG | TEMPERATURE: 98.3 F | DIASTOLIC BLOOD PRESSURE: 61 MMHG | RESPIRATION RATE: 16 BRPM | OXYGEN SATURATION: 98 % | BODY MASS INDEX: 29.62 KG/M2 | HEART RATE: 89 BPM

## 2023-04-14 DIAGNOSIS — A08.32: Primary | ICD-10-CM

## 2023-04-14 LAB
ADV 40+41 DNA STL QL NAA+NON-PROBE: NOT DETECTED
ALBUMIN SERPL-MCNC: 4.5 G/DL (ref 3.5–5.2)
ALBUMIN/GLOB SERPL: 1.3 G/DL
ALP SERPL-CCNC: 112 U/L (ref 39–117)
ALT SERPL W P-5'-P-CCNC: 16 U/L (ref 1–33)
ANION GAP SERPL CALCULATED.3IONS-SCNC: 14 MMOL/L (ref 5–15)
AST SERPL-CCNC: 18 U/L (ref 1–32)
ASTRO TYP 1-8 RNA STL QL NAA+NON-PROBE: DETECTED
B-HCG UR QL: NEGATIVE
BACTERIA UR QL AUTO: ABNORMAL /HPF
BASOPHILS # BLD AUTO: 0.02 10*3/MM3 (ref 0–0.2)
BASOPHILS NFR BLD AUTO: 0.2 % (ref 0–1.5)
BILIRUB SERPL-MCNC: 0.5 MG/DL (ref 0–1.2)
BILIRUB UR QL STRIP: ABNORMAL
BUN SERPL-MCNC: 11 MG/DL (ref 6–20)
BUN/CREAT SERPL: 15.3 (ref 7–25)
C CAYETANENSIS DNA STL QL NAA+NON-PROBE: NOT DETECTED
C COLI+JEJ+UPSA DNA STL QL NAA+NON-PROBE: NOT DETECTED
C DIFF TOX GENS STL QL NAA+PROBE: NEGATIVE
CALCIUM SPEC-SCNC: 9.2 MG/DL (ref 8.6–10.5)
CHLORIDE SERPL-SCNC: 108 MMOL/L (ref 98–107)
CLARITY UR: ABNORMAL
CO2 SERPL-SCNC: 17 MMOL/L (ref 22–29)
COLOR UR: ABNORMAL
CREAT SERPL-MCNC: 0.72 MG/DL (ref 0.57–1)
CRYPTOSP DNA STL QL NAA+NON-PROBE: NOT DETECTED
DEPRECATED RDW RBC AUTO: 42.9 FL (ref 37–54)
E HISTOLYT DNA STL QL NAA+NON-PROBE: NOT DETECTED
EAEC PAA PLAS AGGR+AATA ST NAA+NON-PRB: NOT DETECTED
EC STX1+STX2 GENES STL QL NAA+NON-PROBE: NOT DETECTED
EGFRCR SERPLBLD CKD-EPI 2021: 117 ML/MIN/1.73
EOSINOPHIL # BLD AUTO: 0.09 10*3/MM3 (ref 0–0.4)
EOSINOPHIL NFR BLD AUTO: 1.1 % (ref 0.3–6.2)
EPEC EAE GENE STL QL NAA+NON-PROBE: NOT DETECTED
ERYTHROCYTE [DISTWIDTH] IN BLOOD BY AUTOMATED COUNT: 13.7 % (ref 12.3–15.4)
ETEC LTA+ST1A+ST1B TOX ST NAA+NON-PROBE: NOT DETECTED
G LAMBLIA DNA STL QL NAA+NON-PROBE: NOT DETECTED
GLOBULIN UR ELPH-MCNC: 3.6 GM/DL
GLUCOSE SERPL-MCNC: 114 MG/DL (ref 65–99)
GLUCOSE UR STRIP-MCNC: NEGATIVE MG/DL
HCT VFR BLD AUTO: 42.6 % (ref 34–46.6)
HGB BLD-MCNC: 14.4 G/DL (ref 12–15.9)
HGB UR QL STRIP.AUTO: NEGATIVE
HOLD SPECIMEN: NORMAL
HOLD SPECIMEN: NORMAL
HYALINE CASTS UR QL AUTO: ABNORMAL /LPF
IMM GRANULOCYTES # BLD AUTO: 0.02 10*3/MM3 (ref 0–0.05)
IMM GRANULOCYTES NFR BLD AUTO: 0.2 % (ref 0–0.5)
KETONES UR QL STRIP: ABNORMAL
LEUKOCYTE ESTERASE UR QL STRIP.AUTO: NEGATIVE
LIPASE SERPL-CCNC: 10 U/L (ref 13–60)
LYMPHOCYTES # BLD AUTO: 0.98 10*3/MM3 (ref 0.7–3.1)
LYMPHOCYTES NFR BLD AUTO: 12 % (ref 19.6–45.3)
MCH RBC QN AUTO: 29.1 PG (ref 26.6–33)
MCHC RBC AUTO-ENTMCNC: 33.8 G/DL (ref 31.5–35.7)
MCV RBC AUTO: 86.1 FL (ref 79–97)
MONOCYTES # BLD AUTO: 0.51 10*3/MM3 (ref 0.1–0.9)
MONOCYTES NFR BLD AUTO: 6.3 % (ref 5–12)
NEUTROPHILS NFR BLD AUTO: 6.53 10*3/MM3 (ref 1.7–7)
NEUTROPHILS NFR BLD AUTO: 80.2 % (ref 42.7–76)
NITRITE UR QL STRIP: NEGATIVE
NOROVIRUS GI+II RNA STL QL NAA+NON-PROBE: NOT DETECTED
NRBC BLD AUTO-RTO: 0 /100 WBC (ref 0–0.2)
P SHIGELLOIDES DNA STL QL NAA+NON-PROBE: NOT DETECTED
PH UR STRIP.AUTO: 5.5 [PH] (ref 5–9)
PLATELET # BLD AUTO: 276 10*3/MM3 (ref 140–450)
PMV BLD AUTO: 10.8 FL (ref 6–12)
POTASSIUM SERPL-SCNC: 3.6 MMOL/L (ref 3.5–5.2)
PROT SERPL-MCNC: 8.1 G/DL (ref 6–8.5)
PROT UR QL STRIP: ABNORMAL
RBC # BLD AUTO: 4.95 10*6/MM3 (ref 3.77–5.28)
RBC # UR STRIP: ABNORMAL /HPF
REF LAB TEST METHOD: ABNORMAL
RVA RNA STL QL NAA+NON-PROBE: NOT DETECTED
S ENT+BONG DNA STL QL NAA+NON-PROBE: NOT DETECTED
SAPO I+II+IV+V RNA STL QL NAA+NON-PROBE: NOT DETECTED
SHIGELLA SP+EIEC IPAH ST NAA+NON-PROBE: NOT DETECTED
SODIUM SERPL-SCNC: 139 MMOL/L (ref 136–145)
SP GR UR STRIP: 1.03 (ref 1–1.03)
SQUAMOUS #/AREA URNS HPF: ABNORMAL /HPF
UROBILINOGEN UR QL STRIP: ABNORMAL
V CHOL+PARA+VUL DNA STL QL NAA+NON-PROBE: NOT DETECTED
V CHOLERAE DNA STL QL NAA+NON-PROBE: NOT DETECTED
WBC # UR STRIP: ABNORMAL /HPF
WBC NRBC COR # BLD: 8.15 10*3/MM3 (ref 3.4–10.8)
WHOLE BLOOD HOLD COAG: NORMAL
WHOLE BLOOD HOLD SPECIMEN: NORMAL
Y ENTEROCOL DNA STL QL NAA+NON-PROBE: NOT DETECTED

## 2023-04-14 PROCEDURE — 25510000001 IOPAMIDOL 61 % SOLUTION: Performed by: FAMILY MEDICINE

## 2023-04-14 PROCEDURE — 81001 URINALYSIS AUTO W/SCOPE: CPT | Performed by: FAMILY MEDICINE

## 2023-04-14 PROCEDURE — 96374 THER/PROPH/DIAG INJ IV PUSH: CPT

## 2023-04-14 PROCEDURE — 36415 COLL VENOUS BLD VENIPUNCTURE: CPT

## 2023-04-14 PROCEDURE — 83690 ASSAY OF LIPASE: CPT | Performed by: FAMILY MEDICINE

## 2023-04-14 PROCEDURE — 25010000002 ONDANSETRON PER 1 MG: Performed by: FAMILY MEDICINE

## 2023-04-14 PROCEDURE — 74177 CT ABD & PELVIS W/CONTRAST: CPT

## 2023-04-14 PROCEDURE — 80053 COMPREHEN METABOLIC PANEL: CPT | Performed by: FAMILY MEDICINE

## 2023-04-14 PROCEDURE — 81025 URINE PREGNANCY TEST: CPT | Performed by: FAMILY MEDICINE

## 2023-04-14 PROCEDURE — 99283 EMERGENCY DEPT VISIT LOW MDM: CPT

## 2023-04-14 PROCEDURE — 96361 HYDRATE IV INFUSION ADD-ON: CPT

## 2023-04-14 PROCEDURE — 87493 C DIFF AMPLIFIED PROBE: CPT | Performed by: FAMILY MEDICINE

## 2023-04-14 PROCEDURE — 87507 IADNA-DNA/RNA PROBE TQ 12-25: CPT | Performed by: FAMILY MEDICINE

## 2023-04-14 PROCEDURE — 85025 COMPLETE CBC W/AUTO DIFF WBC: CPT | Performed by: FAMILY MEDICINE

## 2023-04-14 RX ORDER — ONDANSETRON 4 MG/1
4 TABLET, FILM COATED ORAL EVERY 8 HOURS PRN
Qty: 8 TABLET | Refills: 0 | Status: SHIPPED | OUTPATIENT
Start: 2023-04-14

## 2023-04-14 RX ORDER — ONDANSETRON 2 MG/ML
4 INJECTION INTRAMUSCULAR; INTRAVENOUS ONCE
Status: COMPLETED | OUTPATIENT
Start: 2023-04-14 | End: 2023-04-14

## 2023-04-14 RX ORDER — SODIUM CHLORIDE 0.9 % (FLUSH) 0.9 %
10 SYRINGE (ML) INJECTION AS NEEDED
Status: DISCONTINUED | OUTPATIENT
Start: 2023-04-14 | End: 2023-04-14 | Stop reason: HOSPADM

## 2023-04-14 RX ADMIN — SODIUM CHLORIDE 1000 ML: 9 INJECTION, SOLUTION INTRAVENOUS at 11:13

## 2023-04-14 RX ADMIN — ONDANSETRON 4 MG: 2 INJECTION INTRAMUSCULAR; INTRAVENOUS at 11:13

## 2023-04-14 RX ADMIN — IOPAMIDOL 90 ML: 612 INJECTION, SOLUTION INTRAVENOUS at 12:14

## 2023-04-14 NOTE — ED PROVIDER NOTES
Subjective   History of Present Illness  Patient is a 27 years old who present here today because of right upper quadrant pain for the past 3 to 4 days.  Patient also said that she is having nausea vomiting and diarrhea.  Patient said that she does not have a gallbladder anymore.  Denies any fever chills or sweating.  Denies any radiation of pain.    History provided by:  Patient   used: No        Review of Systems   Gastrointestinal: Positive for abdominal pain.   All other systems reviewed and are negative.      Past Medical History:   Diagnosis Date   • Abnormal Pap smear of cervix    • Acute maxillary sinusitis    • Acute otitis externa    • Acute pharyngitis    • Acute tonsillitis    • Allergic asthma     IgE-mediated allergic asthma     • Allergic rhinitis    • Allergic rhinitis due to pollen    • Anxiety    • Asthma    • Chondromalacia of patella     left knee    • Closed wedge compression fracture of T7 vertebra with routine healing 8/11/2020   • Common cold    • Cough    • Diarrhea    • Disorder of gallbladder     Probable biliary dyskinesia    • Epigastric pain    • Foot pain    • Gastroenteritis    • Generalized abdominal pain    • Headache    • History of colonoscopy 03/27/2013    Colon endoscopy 80853 (1)  -  Internal & external hemorrhoids found.   • History of esophagogastroduodenoscopy (EGD) 03/27/2013    w/ tube 05315 (1)  -  Normal esophagus. Gastritis in stomach. Biopsy taken. Normal duodenum. Biospy taken   • History of marijuana use    • HPV (human papilloma virus) infection    • IBS (irritable bowel syndrome)    • Influenza    • Irregular periods    • Irritable bowel syndrome    • Migraine    • Nausea    • Nausea and vomiting    • Osgood-Schlatter's disease    • Pain in throat    • Patellar tendonitis    • Right upper quadrant pain    • Streptococcal sore throat    • Temporomandibular joint disorder     WISDOM TEETH    • Upper respiratory infection    • Urgency of urination     • Urinary tract infectious disease    • Varicella    • Viral gastroenteritis    • Vulvovaginitis        Allergies   Allergen Reactions   • Aspirin Shortness Of Breath     TIGHT CHEST   • Erythromycin Base Rash   • Latex Rash     Rash        Past Surgical History:   Procedure Laterality Date   • CHOLECYSTECTOMY  06/06/2013    Laparoscopic  -  laparoscopic cholecystectomy with intraoperative cholangiogram. Cholecystitis.   • INJECTION OF MEDICATION  01/08/2013    Toradol (1)   -  W. Sotomayor   • LAPAROSCOPIC CHOLECYSTECTOMY     • WISDOM TOOTH EXTRACTION         Family History   Adopted: Yes   Problem Relation Age of Onset   • Cancer Other    • Diabetes Other    • Heart disease Other    • Hypertension Other    • Stroke Other    • Lung disease Other    • Cholelithiasis Other    • Ulcers Other    • Other Other         Colon problems   • Ovarian cysts Mother    • Bipolar disorder Mother    • Irritable bowel syndrome Mother    • Ovarian cancer Mother    • No Known Problems Sister    • Emphysema Maternal Grandmother    • Heart attack Maternal Grandfather    • No Known Problems Sister    • No Known Problems Sister        Social History     Socioeconomic History   • Marital status:    Tobacco Use   • Smoking status: Never   • Smokeless tobacco: Never   Substance and Sexual Activity   • Alcohol use: No   • Drug use: Yes     Types: Marijuana     Comment: stopped with positive pregnancy test    • Sexual activity: Yes     Partners: Male     Birth control/protection: None     Comment: last pap smear 5/17/19 ASCUS            Objective   Physical Exam  Vitals and nursing note reviewed.   Constitutional:       Appearance: She is well-developed. She is obese.   HENT:      Head: Normocephalic and atraumatic.      Mouth/Throat:      Mouth: Mucous membranes are moist.      Pharynx: Oropharynx is clear.   Eyes:      Extraocular Movements: Extraocular movements intact.      Pupils: Pupils are equal, round, and reactive to light.    Cardiovascular:      Rate and Rhythm: Normal rate and regular rhythm.      Heart sounds: Normal heart sounds.   Pulmonary:      Effort: Pulmonary effort is normal.      Breath sounds: Normal breath sounds.   Abdominal:      General: Abdomen is flat. Bowel sounds are normal.      Palpations: Abdomen is soft.      Tenderness: There is abdominal tenderness in the right upper quadrant and epigastric area.   Genitourinary:     Adnexa: Right adnexa normal.   Skin:     General: Skin is warm.      Capillary Refill: Capillary refill takes less than 2 seconds.   Neurological:      General: No focal deficit present.      Mental Status: She is alert and oriented to person, place, and time.   Psychiatric:         Mood and Affect: Mood normal.         Behavior: Behavior normal.         Procedures           ED Course  ED Course as of 04/14/23 1555   Fri Apr 14, 2023   1549 Results were discussed with patient. [MO]      ED Course User Index  [MO] Azeem Bell MD           Labs Reviewed   GASTROINTESTINAL PANEL, PCR - Abnormal; Notable for the following components:       Result Value    Astrovirus Detected (*)     All other components within normal limits    Narrative:     Testing performed by multiplex PCR system.   COMPREHENSIVE METABOLIC PANEL - Abnormal; Notable for the following components:    Glucose 114 (*)     Chloride 108 (*)     CO2 17.0 (*)     All other components within normal limits    Narrative:     GFR Normal >60  Chronic Kidney Disease <60  Kidney Failure <15     LIPASE - Abnormal; Notable for the following components:    Lipase 10 (*)     All other components within normal limits   CBC WITH AUTO DIFFERENTIAL - Abnormal; Notable for the following components:    Neutrophil % 80.2 (*)     Lymphocyte % 12.0 (*)     All other components within normal limits   URINALYSIS W/ MICROSCOPIC IF INDICATED (NO CULTURE) - Abnormal; Notable for the following components:    Appearance, UA Turbid (*)     Ketones, UA  Trace (*)     Bilirubin, UA Small (1+) (*)     Protein, UA 30 mg/dL (1+) (*)     All other components within normal limits   URINALYSIS, MICROSCOPIC ONLY - Abnormal; Notable for the following components:    RBC, UA 0-2 (*)     Bacteria, UA Trace (*)     Squamous Epithelial Cells, UA 6-12 (*)     All other components within normal limits   CLOSTRIDIOIDES DIFFICILE TOXIN, PCR - Normal    Narrative:     The result indicates the absence of toxigenic C. difficile from stool specimen.    PREGNANCY, URINE - Normal   CLOSTRIDIOIDES DIFFICILE TOXIN    Narrative:     The following orders were created for panel order Clostridioides difficile Toxin - Stool, Per Rectum.  Procedure                               Abnormality         Status                     ---------                               -----------         ------                     Clostridioides difficile...[[id number]]  Normal              Final result                 Please view results for these tests on the individual orders.   RAINBOW DRAW    Narrative:     The following orders were created for panel order [place] Draw.  Procedure                               Abnormality         Status                     ---------                               -----------         ------                     Green Top (Gel)[[id number]]                                  Final result               Lavender Top[[id number]]                                     Final result               Gold Top - SST[[id number]]                                   Final result               Light Blue Top[[id number]]                                   Final result                 Please view results for these tests on the individual orders.   CBC AND DIFFERENTIAL    Narrative:     The following orders were created for panel order CBC & Differential.  Procedure                               Abnormality         Status                     ---------                               -----------         ------                      CBC Auto Differential[[id number]]        Abnormal            Final result                 Please view results for these tests on the individual orders.   GREEN TOP   LAVENDER TOP   GOLD TOP - SST   LIGHT BLUE TOP       CT Abdomen Pelvis With Contrast                                         Medical Decision Making  Patient is a 27 years old who present here today because of nausea and vomiting and diarrhea and abdominal pain for for the past few days.  CT of abdomen was done which showed possible enteritis.  C. difficile was done which was negative.  Gastrointestinal panel was done which showed astrovirus infection.  Patient was discharged home with some Zofran and hydration was encouraged.  Advised to follow with the primary care in 3 days.    Amount and/or Complexity of Data Reviewed  Labs: ordered.  Radiology: ordered.      Risk  Prescription drug management.          Final diagnoses:   Enteritis due to astrovirus       ED Disposition  ED Disposition     ED Disposition   Discharge    Condition   Stable    Comment   --             Rom Tapia MD  [facility] [address] [place] [address] [phone]    In 3 days           Medication List      New Prescriptions    ondansetron 4 MG tablet  Commonly known as: ZOFRAN  Take 1 tablet by mouth Every 8 (Eight) Hours As Needed for Nausea or Vomiting.        Stop    promethazine 25 MG tablet  Commonly known as: PHENERGAN           Where to Get Your Medications      These medications were sent to [facility]/pharmacy #[id number] - [place], [place] - [address] [place] [address] - [phone]  - [phone] FX  [address] [place] [id number]    Phone: [phone]   · ondansetron 4 MG tablet          Azeem Bell MD  04/14/23 1291

## 2023-04-14 NOTE — DISCHARGE INSTRUCTIONS
Result discussed with patient.  Advised to take medication as directed.  Hydration was encouraged.  Follow-up with your primary care in 3 days.  Come back to the ER symptoms get worse.

## 2023-04-14 NOTE — Clinical Note
[facility] EMERGENCY DEPARTMENT  [address] [place] [id number]  Phone: [phone]    Millie Quevedo was seen and treated in our emergency department on 4/14/2023.  She may return to work on 04/17/2023.         Thank you for choosing [facility].    Azeem Bell MD

## 2023-04-18 DIAGNOSIS — G47.00 INSOMNIA, UNSPECIFIED TYPE: ICD-10-CM

## 2023-04-18 RX ORDER — QUETIAPINE FUMARATE 300 MG/1
300 TABLET, FILM COATED ORAL NIGHTLY
Qty: 30 TABLET | Refills: 3 | OUTPATIENT
Start: 2023-04-18

## 2023-04-18 NOTE — TELEPHONE ENCOUNTER
Incoming Refill Request      Medication requested (name and dose):   QUEtiapine (SEROquel) 300 MG tablet    Pharmacy where request should be sent: [facility]    Additional details provided by patient:     Best call back number: [phone]    Does the patient have less than a 3 day supply:  [x] Yes  [] No    Marycarmen Tapia  04/18/23, 09:11 CDT

## 2023-05-02 DIAGNOSIS — G47.00 INSOMNIA, UNSPECIFIED TYPE: ICD-10-CM

## 2023-05-02 RX ORDER — QUETIAPINE FUMARATE 300 MG/1
300 TABLET, FILM COATED ORAL NIGHTLY
Qty: 30 TABLET | Refills: 3 | Status: SHIPPED | OUTPATIENT
Start: 2023-05-02

## 2023-06-01 DIAGNOSIS — G47.00 INSOMNIA, UNSPECIFIED TYPE: ICD-10-CM

## 2023-06-02 ENCOUNTER — OFFICE VISIT (OUTPATIENT)
Dept: FAMILY MEDICINE CLINIC | Facility: CLINIC | Age: 28
End: 2023-06-02
Payer: COMMERCIAL

## 2023-06-02 VITALS
HEIGHT: 71 IN | SYSTOLIC BLOOD PRESSURE: 122 MMHG | WEIGHT: 206.1 LBS | TEMPERATURE: 98 F | DIASTOLIC BLOOD PRESSURE: 82 MMHG | BODY MASS INDEX: 28.85 KG/M2 | OXYGEN SATURATION: 97 % | HEART RATE: 96 BPM

## 2023-06-02 DIAGNOSIS — G47.00 INSOMNIA, UNSPECIFIED TYPE: ICD-10-CM

## 2023-06-02 DIAGNOSIS — M53.3 SI (SACROILIAC) JOINT DYSFUNCTION: Primary | ICD-10-CM

## 2023-06-02 RX ORDER — MELOXICAM 7.5 MG/1
7.5 TABLET ORAL DAILY
Qty: 30 TABLET | Refills: 0 | Status: SHIPPED | OUTPATIENT
Start: 2023-06-02

## 2023-06-02 RX ORDER — QUETIAPINE FUMARATE 300 MG/1
300 TABLET, FILM COATED ORAL NIGHTLY
Qty: 30 TABLET | Refills: 3 | Status: SHIPPED | OUTPATIENT
Start: 2023-06-02 | End: 2023-06-05 | Stop reason: SDUPTHER

## 2023-06-02 NOTE — PROGRESS NOTES
Family [facility]  Rom Tapia MD    Subjective:     Millie Quevedo is a 27 y.o. female who presents for right hip pain.     Right hip pain started 2 weeks ago.  Pain is located in right posterior hip/lower back.  Patient reports she bent down and set her hip buckled.  Pain radiates to shin.  Pain has been constant since starting.  Reports trying lidocaine but did not help.  Also tried cyclobenzaprine that did not help.  Reports having spasms that go down the leg.  Reports Tylenol does make the pain better.    Insomnia - patient reports Seroquel is working fairly well but says that she is starting to have some tolerance to it.  Does not wish to add another medicine at this time.  May want to add medicine in the future.      Past Medical Hx:  Past Medical History:   Diagnosis Date   • Abnormal Pap smear of cervix    • Acute maxillary sinusitis    • Acute otitis externa    • Acute pharyngitis    • Acute tonsillitis    • Allergic asthma     IgE-mediated allergic asthma     • Allergic rhinitis    • Allergic rhinitis due to pollen    • Anxiety    • Asthma    • Chondromalacia of patella     left knee    • Closed wedge compression fracture of T7 vertebra with routine healing 8/11/2020   • Common cold    • Cough    • Diarrhea    • Disorder of gallbladder     Probable biliary dyskinesia    • Epigastric pain    • Foot pain    • Gastroenteritis    • Generalized abdominal pain    • Headache    • History of colonoscopy 03/27/2013    Colon endoscopy 16263 (1)  -  Internal & external hemorrhoids found.   • History of esophagogastroduodenoscopy (EGD) 03/27/2013    w/ tube 95972 (1)  -  Normal esophagus. Gastritis in stomach. Biopsy taken. Normal duodenum. Biospy taken   • History of marijuana use    • HPV (human papilloma virus) infection    • IBS (irritable bowel syndrome)    • Influenza    • Irregular periods    • Irritable bowel syndrome    • Migraine    • Nausea    • Nausea and vomiting    •  Osgood-Schlatter's disease    • Pain in throat    • Patellar tendonitis    • Right upper quadrant pain    • Streptococcal sore throat    • Temporomandibular joint disorder     WISDOM TEETH    • Upper respiratory infection    • Urgency of urination    • Urinary tract infectious disease    • Varicella    • Viral gastroenteritis    • Vulvovaginitis        Past Surgical Hx:  Past Surgical History:   Procedure Laterality Date   • CHOLECYSTECTOMY  06/06/2013    Laparoscopic  -  laparoscopic cholecystectomy with intraoperative cholangiogram. Cholecystitis.   • INJECTION OF MEDICATION  01/08/2013    Toradol (1)   -  FLORY Sotomayor   • LAPAROSCOPIC CHOLECYSTECTOMY     • WISDOM TOOTH EXTRACTION         Current Meds:    Current Outpatient Medications:   •  albuterol sulfate  (90 Base) MCG/ACT inhaler, Inhale 2 puffs Every 6 (Six) Hours As Needed for Wheezing. (Patient not taking: Reported on 6/2/2023), Disp: 6.7 g, Rfl: 0  •  lidocaine (LIDODERM) 5 %, Place 1 patch on the skin as directed by provider Daily. Remove & Discard patch within 12 hours or as directed by MD (Patient not taking: Reported on 6/2/2023), Disp: 20 patch, Rfl: 0  •  meloxicam (Mobic) 7.5 MG tablet, Take 1 tablet by mouth Daily., Disp: 30 tablet, Rfl: 0  •  ondansetron (ZOFRAN) 4 MG tablet, Take 1 tablet by mouth Every 8 (Eight) Hours As Needed for Nausea or Vomiting. (Patient not taking: Reported on 6/2/2023), Disp: 8 tablet, Rfl: 0  •  QUEtiapine (SEROquel) 300 MG tablet, Take 1 tablet by mouth Every Night., Disp: 30 tablet, Rfl: 3    Allergies:  Allergies   Allergen Reactions   • Aspirin Shortness Of Breath     TIGHT CHEST   • Erythromycin Base Rash   • Latex Rash     Rash        Family Hx:  Family History   Adopted: Yes   Problem Relation Age of Onset   • Cancer Other    • Diabetes Other    • Heart disease Other    • Hypertension Other    • Stroke Other    • Lung disease Other    • Cholelithiasis Other    • Ulcers Other    • Other Other         Colon  "problems   • Ovarian cysts Mother    • Bipolar disorder Mother    • Irritable bowel syndrome Mother    • Ovarian cancer Mother    • No Known Problems Sister    • Emphysema Maternal Grandmother    • Heart attack Maternal Grandfather    • No Known Problems Sister    • No Known Problems Sister         Social History:  Social History     Socioeconomic History   • Marital status:    Tobacco Use   • Smoking status: Never   • Smokeless tobacco: Never   Substance and Sexual Activity   • Alcohol use: No   • Drug use: Yes     Types: Marijuana     Comment: stopped with positive pregnancy test    • Sexual activity: Yes     Partners: Male     Birth control/protection: None     Comment: last pap smear 5/17/19 ASCUS        Review of Systems  Review of Systems   Musculoskeletal: Positive for back pain.        Hip pain  Shin pain   Neurological: Negative for numbness.       Objective:     /82   Pulse 96   Temp 98 °F (36.7 °C)   Ht 180.3 cm (71\")   Wt 93.5 kg (206 lb 1.6 oz)   SpO2 97%   BMI 28.75 kg/m²   Physical Exam  Constitutional:       General: She is not in acute distress.  Pulmonary:      Effort: Pulmonary effort is normal. No respiratory distress.   Musculoskeletal:      Lumbar back: Tenderness present. Negative right straight leg raise test and negative left straight leg raise test.      Comments: Tenderness to palpation at SI joint   Skin:     General: Skin is warm.   Neurological:      Mental Status: She is alert. Mental status is at baseline.          Assessment/Plan:     Diagnoses and all orders for this visit:    1. SI (sacroiliac) joint dysfunction (Primary)  -     meloxicam (Mobic) 7.5 MG tablet; Take 1 tablet by mouth Daily.  Dispense: 30 tablet; Refill: 0  -     Ambulatory Referral to Physical Therapy Evaluate and treat; Heat; Soft Tissue Mobilizaton; Stretching, Strengthening    2. Insomnia, unspecified type      1.  Acute, uncontrolled.  Hip/back pain consistent with SI joint dysfunction.  We " will start Mobic at this time.  We will also refer to physical therapy for evaluation and management.  Can increase Mobic in a couple of weeks if patient having some benefit but needing better control.  Patient has aspirin allergy but reportedly has tolerated NSAIDs previously.    2. Chronic, Stable.  We will continue with Seroquel at this time.  Can consider adding doxepin in the future as patient is on max dose of Seroquel for insomnia.     Follow-up:     Return in about 6 weeks (around 7/14/2023).    RISK SCORE: 2      This document has been electronically signed by Rom Tapia MD on June 12, 2023 17:55 CDT

## 2023-06-05 DIAGNOSIS — G47.00 INSOMNIA, UNSPECIFIED TYPE: ICD-10-CM

## 2023-06-05 RX ORDER — QUETIAPINE FUMARATE 300 MG/1
300 TABLET, FILM COATED ORAL NIGHTLY
Qty: 30 TABLET | Refills: 3 | Status: SHIPPED | OUTPATIENT
Start: 2023-06-05

## 2023-06-14 DIAGNOSIS — G47.00 INSOMNIA, UNSPECIFIED TYPE: Primary | ICD-10-CM

## 2023-06-14 RX ORDER — DOXEPIN HYDROCHLORIDE 3 MG/1
3 TABLET ORAL NIGHTLY PRN
Qty: 30 TABLET | Refills: 1 | Status: SHIPPED | OUTPATIENT
Start: 2023-06-14

## 2023-06-19 NOTE — PROGRESS NOTES
I have seen this patient and discussed the case with the resident and agree with the assessment and plan.  ABRAHAN Jorge M.D.

## 2023-07-27 DIAGNOSIS — G47.00 INSOMNIA, UNSPECIFIED TYPE: ICD-10-CM

## 2023-07-27 RX ORDER — DOXEPIN HYDROCHLORIDE 6 MG/1
6 TABLET ORAL NIGHTLY PRN
Qty: 30 TABLET | Refills: 3 | Status: SHIPPED | OUTPATIENT
Start: 2023-07-27

## 2023-07-31 DIAGNOSIS — J45.20 MILD INTERMITTENT EXTRINSIC ASTHMA WITHOUT COMPLICATION: Primary | ICD-10-CM

## 2023-07-31 RX ORDER — ALBUTEROL SULFATE 90 UG/1
2 AEROSOL, METERED RESPIRATORY (INHALATION) EVERY 6 HOURS PRN
Qty: 6.7 G | Refills: 0 | Status: SHIPPED | OUTPATIENT
Start: 2023-07-31

## 2023-08-04 ENCOUNTER — DOCUMENTATION (OUTPATIENT)
Dept: FAMILY MEDICINE CLINIC | Facility: CLINIC | Age: 28
End: 2023-08-04
Payer: COMMERCIAL

## 2023-08-15 DIAGNOSIS — G47.00 INSOMNIA, UNSPECIFIED TYPE: ICD-10-CM

## 2023-08-15 RX ORDER — QUETIAPINE FUMARATE 300 MG/1
300 TABLET, FILM COATED ORAL NIGHTLY
Qty: 30 TABLET | Refills: 3 | Status: SHIPPED | OUTPATIENT
Start: 2023-08-15

## 2023-08-24 DIAGNOSIS — G47.00 INSOMNIA, UNSPECIFIED TYPE: Primary | ICD-10-CM

## 2023-08-24 RX ORDER — DOXEPIN HYDROCHLORIDE 10 MG/ML
8 SOLUTION ORAL NIGHTLY
Qty: 118 ML | Refills: 12 | Status: SHIPPED | OUTPATIENT
Start: 2023-08-24